# Patient Record
Sex: MALE | Race: WHITE | NOT HISPANIC OR LATINO | Employment: OTHER | URBAN - METROPOLITAN AREA
[De-identification: names, ages, dates, MRNs, and addresses within clinical notes are randomized per-mention and may not be internally consistent; named-entity substitution may affect disease eponyms.]

---

## 2019-01-31 ENCOUNTER — OFFICE VISIT (OUTPATIENT)
Dept: URGENT CARE | Age: 49
End: 2019-01-31
Payer: COMMERCIAL

## 2019-01-31 ENCOUNTER — HOSPITAL ENCOUNTER (EMERGENCY)
Facility: HOSPITAL | Age: 49
Discharge: HOME | End: 2019-01-31
Attending: EMERGENCY MEDICINE
Payer: COMMERCIAL

## 2019-01-31 VITALS
DIASTOLIC BLOOD PRESSURE: 98 MMHG | WEIGHT: 151 LBS | HEART RATE: 97 BPM | TEMPERATURE: 97.9 F | HEIGHT: 65 IN | RESPIRATION RATE: 20 BRPM | OXYGEN SATURATION: 98 % | SYSTOLIC BLOOD PRESSURE: 180 MMHG | BODY MASS INDEX: 25.16 KG/M2

## 2019-01-31 VITALS
HEART RATE: 85 BPM | DIASTOLIC BLOOD PRESSURE: 97 MMHG | SYSTOLIC BLOOD PRESSURE: 152 MMHG | OXYGEN SATURATION: 99 % | RESPIRATION RATE: 18 BRPM | BODY MASS INDEX: 24.8 KG/M2 | WEIGHT: 149.03 LBS | TEMPERATURE: 98 F

## 2019-01-31 DIAGNOSIS — F10.20 ALCOHOL DEPENDENCE, DAILY USE (HCC): ICD-10-CM

## 2019-01-31 DIAGNOSIS — R25.1 SHAKINESS: Primary | ICD-10-CM

## 2019-01-31 DIAGNOSIS — I10 HYPERTENSION, UNSPECIFIED TYPE: ICD-10-CM

## 2019-01-31 DIAGNOSIS — R25.1 INTERMITTENT TREMOR: Primary | ICD-10-CM

## 2019-01-31 LAB
ALBUMIN SERPL BCP-MCNC: 4.2 G/DL (ref 3.5–5)
ALP SERPL-CCNC: 61 U/L (ref 46–116)
ALT SERPL W P-5'-P-CCNC: 70 U/L (ref 12–78)
ANION GAP SERPL CALCULATED.3IONS-SCNC: 10 MMOL/L (ref 4–13)
AST SERPL W P-5'-P-CCNC: 43 U/L (ref 5–45)
BASOPHILS # BLD AUTO: 0.05 THOUSANDS/ΜL (ref 0–0.1)
BASOPHILS NFR BLD AUTO: 1 % (ref 0–1)
BILIRUB SERPL-MCNC: 0.5 MG/DL (ref 0.2–1)
BUN SERPL-MCNC: 17 MG/DL (ref 5–25)
CALCIUM SERPL-MCNC: 9.4 MG/DL (ref 8.3–10.1)
CHLORIDE SERPL-SCNC: 102 MMOL/L (ref 100–108)
CO2 SERPL-SCNC: 27 MMOL/L (ref 21–32)
CREAT SERPL-MCNC: 1.05 MG/DL (ref 0.6–1.3)
EOSINOPHIL # BLD AUTO: 0.06 THOUSAND/ΜL (ref 0–0.61)
EOSINOPHIL NFR BLD AUTO: 1 % (ref 0–6)
ERYTHROCYTE [DISTWIDTH] IN BLOOD BY AUTOMATED COUNT: 12.6 % (ref 11.6–15.1)
EST. AVERAGE GLUCOSE BLD GHB EST-MCNC: 103 MG/DL
GFR SERPL CREATININE-BSD FRML MDRD: 84 ML/MIN/1.73SQ M
GLUCOSE SERPL-MCNC: 101 MG/DL (ref 65–140)
GLUCOSE SERPL-MCNC: 83 MG/DL (ref 65–140)
HBA1C MFR BLD: 5.2 % (ref 4.2–6.3)
HCT VFR BLD AUTO: 45.3 % (ref 36.5–49.3)
HGB BLD-MCNC: 15.6 G/DL (ref 12–17)
IMM GRANULOCYTES # BLD AUTO: 0.04 THOUSAND/UL (ref 0–0.2)
IMM GRANULOCYTES NFR BLD AUTO: 1 % (ref 0–2)
LIPASE SERPL-CCNC: 241 U/L (ref 73–393)
LYMPHOCYTES # BLD AUTO: 1.16 THOUSANDS/ΜL (ref 0.6–4.47)
LYMPHOCYTES NFR BLD AUTO: 21 % (ref 14–44)
MAGNESIUM SERPL-MCNC: 1.9 MG/DL (ref 1.6–2.6)
MCH RBC QN AUTO: 32.7 PG (ref 26.8–34.3)
MCHC RBC AUTO-ENTMCNC: 34.4 G/DL (ref 31.4–37.4)
MCV RBC AUTO: 95 FL (ref 82–98)
MONOCYTES # BLD AUTO: 0.55 THOUSAND/ΜL (ref 0.17–1.22)
MONOCYTES NFR BLD AUTO: 10 % (ref 4–12)
NEUTROPHILS # BLD AUTO: 3.78 THOUSANDS/ΜL (ref 1.85–7.62)
NEUTS SEG NFR BLD AUTO: 66 % (ref 43–75)
NRBC BLD AUTO-RTO: 0 /100 WBCS
PHOSPHATE SERPL-MCNC: 3.6 MG/DL (ref 2.7–4.5)
PLATELET # BLD AUTO: 188 THOUSANDS/UL (ref 149–390)
PMV BLD AUTO: 8.7 FL (ref 8.9–12.7)
POTASSIUM SERPL-SCNC: 4.3 MMOL/L (ref 3.5–5.3)
PROT SERPL-MCNC: 7.9 G/DL (ref 6.4–8.2)
RBC # BLD AUTO: 4.77 MILLION/UL (ref 3.88–5.62)
SODIUM SERPL-SCNC: 139 MMOL/L (ref 136–145)
TSH SERPL DL<=0.05 MIU/L-ACNC: 1.95 UIU/ML (ref 0.36–3.74)
WBC # BLD AUTO: 5.64 THOUSAND/UL (ref 4.31–10.16)

## 2019-01-31 PROCEDURE — S9083 URGENT CARE CENTER GLOBAL: HCPCS | Performed by: FAMILY MEDICINE

## 2019-01-31 PROCEDURE — G0382 LEV 3 HOSP TYPE B ED VISIT: HCPCS | Performed by: FAMILY MEDICINE

## 2019-01-31 PROCEDURE — 84100 ASSAY OF PHOSPHORUS: CPT | Performed by: EMERGENCY MEDICINE

## 2019-01-31 PROCEDURE — 36415 COLL VENOUS BLD VENIPUNCTURE: CPT | Performed by: EMERGENCY MEDICINE

## 2019-01-31 PROCEDURE — 80053 COMPREHEN METABOLIC PANEL: CPT | Performed by: EMERGENCY MEDICINE

## 2019-01-31 PROCEDURE — 84443 ASSAY THYROID STIM HORMONE: CPT | Performed by: EMERGENCY MEDICINE

## 2019-01-31 PROCEDURE — 82948 REAGENT STRIP/BLOOD GLUCOSE: CPT | Performed by: PHYSICIAN ASSISTANT

## 2019-01-31 PROCEDURE — 83735 ASSAY OF MAGNESIUM: CPT | Performed by: EMERGENCY MEDICINE

## 2019-01-31 PROCEDURE — 96360 HYDRATION IV INFUSION INIT: CPT

## 2019-01-31 PROCEDURE — 85025 COMPLETE CBC W/AUTO DIFF WBC: CPT | Performed by: EMERGENCY MEDICINE

## 2019-01-31 PROCEDURE — 99284 EMERGENCY DEPT VISIT MOD MDM: CPT

## 2019-01-31 PROCEDURE — 93005 ELECTROCARDIOGRAM TRACING: CPT | Performed by: PHYSICIAN ASSISTANT

## 2019-01-31 PROCEDURE — 83690 ASSAY OF LIPASE: CPT | Performed by: EMERGENCY MEDICINE

## 2019-01-31 PROCEDURE — 83036 HEMOGLOBIN GLYCOSYLATED A1C: CPT | Performed by: EMERGENCY MEDICINE

## 2019-01-31 RX ORDER — DIPHENOXYLATE HYDROCHLORIDE AND ATROPINE SULFATE 2.5; .025 MG/1; MG/1
1 TABLET ORAL DAILY
COMMUNITY

## 2019-01-31 RX ADMIN — SODIUM CHLORIDE 1000 ML: 0.9 INJECTION, SOLUTION INTRAVENOUS at 15:27

## 2019-01-31 NOTE — PATIENT INSTRUCTIONS
Offered EKG, ambulance- Patient refused  Did allow POCT BG- 83  Patient initially refused ER transfer but then called his wife and then agreed to have her drive him to [facility] ER  Alert, oriented, capable of making own medical decisions and understands the risks of refusing ambulance transfer and EKG  He does agree to go to the ED for further evaluation and treatment

## 2019-01-31 NOTE — ED PROVIDER NOTES
History  Chief Complaint   Patient presents with    Shaking     Pt reports shakiness that comes and goes for the past few weeks  Pt denies pain  Pt reports he was seen at [place] [address] and he was sent to the ER  35-year-old male presents to the emergency department for evaluation of hand tremor  Patient states that he has noticed tremulousness of the hands intermittently over the past 1 month  Initially he thought this was due to caffeine intake and he cut back on coffee  He was also concerned that he may have an issue with his blood sugar but a blood sugar check at urgent care was normal   Patient admits to drinking approximately 10 to 15 alcoholic drinks per day  He has been drinking for over the past 18 years  He has never stop drinking and has not experience withdrawal symptoms  He states this morning was the most severe 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 and his hand was severely shaking  He did not drink any alcohol today but states that the shaking has improved throughout the day  He does note that the shakiness does tend to improve throughout the day when he starts drinking  He states he normally starts drinking around lunchtime and will drink until about 9:00 p m  Ye Castanon Patient's wife is at the bedside and states that she has never seen him not drink in the past 18 years with the relationship  Patient has not had a physical exam in several years  He went to urgent care today and was found to be slightly tremulous with elevated blood pressure and was directed to the emergency department  Patient denies nausea vomiting  No hallucinations  He denies diaphoresis  He has had a normal appetite  No weight loss or gain  When specifically asked the patient is interested in stopping drinking he states that he is not ready to make that decision          History provided by:  Patient, spouse and medical records   used: No    Drug / Alcohol Assessment   Location: Patient presents for evaluation of tremor  Quality:  Pain was tremor  Severity:  Moderate  Onset quality:  Gradual  Timing:  Intermittent  Progression:  Waxing and waning  Chronicity:  Recurrent  Context:  Drinks approximately 10 to 15 drinks per day  Relieved by:  Alcohol  Worsened by: Waking up in the morning  Ineffective treatments:  None dried  Associated symptoms: no abdominal pain, no diarrhea, no nausea, no shortness of breath and no vomiting        Prior to Admission Medications   Prescriptions Last Dose Informant Patient Reported? Taking? Fexofenadine HCl (ALLEGRA ALLERGY PO)   Yes Yes   Sig: Take by mouth   multivitamin (THERAGRAN) TABS   Yes Yes   Sig: Take 1 tablet by mouth daily      Facility-Administered Medications: None       Past Medical History:   Diagnosis Date    Allergic rhinitis        Past Surgical History:   Procedure Laterality Date    VASECTOMY         Family History   Problem Relation Age of Onset    No Known Problems Mother     No Known Problems Father      I have reviewed and agree with the history as documented  Social History   Substance Use Topics    Smoking status: Current Some Day Smoker     Types: Cigars    Smokeless tobacco: Never Used    Alcohol use 7 8 - 9 0 oz/week     10 - 12 Standard drinks or equivalent, 3 Cans of beer per week        Review of Systems   Constitutional: Negative for appetite change and unexpected weight change  Respiratory: Negative for shortness of breath  Gastrointestinal: Negative for abdominal pain, diarrhea, nausea and vomiting  Neurological: Positive for tremors  Negative for dizziness, speech difficulty and weakness  Psychiatric/Behavioral: Negative for sleep disturbance  The patient is not nervous/anxious  Frequent night terrors   All other systems reviewed and are negative  Physical Exam  Physical Exam   Constitutional: He is oriented to person, place, and time   Vital signs are normal  He appears well-developed and well-nourished  HENT:   Head: Normocephalic and atraumatic  Eyes: Pupils are equal, round, and reactive to light  Conjunctivae and EOM are normal    Neck: Normal range of motion  Neck supple  Cardiovascular: Normal rate, regular rhythm, normal heart sounds and intact distal pulses  No murmur heard  Pulmonary/Chest: Effort normal and breath sounds normal  No accessory muscle usage  No respiratory distress  He exhibits no tenderness  Abdominal: Soft  Normal appearance and bowel sounds are normal  He exhibits no distension  There is no tenderness  There is no rebound and no guarding  Musculoskeletal: Normal range of motion  He exhibits no edema, tenderness or deformity  Lymphadenopathy:     He has no cervical adenopathy  Neurological: He is alert and oriented to person, place, and time  He has normal strength and normal reflexes  He displays tremor  No cranial nerve deficit  He exhibits normal muscle tone  He displays no seizure activity  Coordination and gait normal  GCS eye subscore is 4  GCS verbal subscore is 5  GCS motor subscore is 6  Coarse tremor of hands with both arms held straight out   Skin: Skin is warm, dry and intact  No rash noted  Psychiatric: He has a normal mood and affect  His behavior is normal  Judgment and thought content normal    Nursing note and vitals reviewed        Vital Signs  ED Triage Vitals   Temperature Pulse Respirations Blood Pressure SpO2   01/31/19 1447 01/31/19 1445 01/31/19 1445 01/31/19 1445 01/31/19 1445   98 °F (36 7 °C) 85 18 152/97 99 %      Temp Source Heart Rate Source Patient Position - Orthostatic VS BP Location FiO2 (%)   01/31/19 1447 -- 01/31/19 1445 01/31/19 1445 --   Oral  Sitting Right arm       Pain Score       01/31/19 1445       No Pain           Vitals:    01/31/19 1445   BP: 152/97   Pulse: 85   Patient Position - Orthostatic VS: Sitting       Visual Acuity      ED Medications  Medications   sodium chloride 0 9 % bolus 1,000 mL (0 mL Intravenous Stopped 1/31/19 1617)       Diagnostic Studies  Results Reviewed     Procedure Component Value Units Date/Time    Hemoglobin A1C [[id number]] Collected:  01/31/19 1517    Lab Status: In process Specimen:  Blood Updated:  01/31/19 1602    Lipase [[id number]]  (Normal) Collected:  01/31/19 1517    Lab Status:  Final result Specimen:  Blood from Arm, Right Updated:  01/31/19 1547     Lipase 241 u/L     TSH [[id number]]  (Normal) Collected:  01/31/19 1517    Lab Status:  Final result Specimen:  Blood from Arm, Right Updated:  01/31/19 1547     TSH 3RD GENERATON 1 951 uIU/mL     Narrative:         Patients undergoing fluorescein dye angiography may retain small amounts of fluorescein in the body for 48-72 hours post procedure  Samples containing fluorescein can produce falsely depressed TSH values  If the patient had this procedure,a specimen should be resubmitted post fluorescein clearance      Magnesium [[id number]]  (Normal) Collected:  01/31/19 1517    Lab Status:  Final result Specimen:  Blood from Arm, Right Updated:  01/31/19 1547     Magnesium 1 9 mg/dL     Phosphorus [[id number]]  (Normal) Collected:  01/31/19 1517    Lab Status:  Final result Specimen:  Blood from Arm, Right Updated:  01/31/19 1547     Phosphorus 3 6 mg/dL     Comprehensive metabolic panel [[id number]] Collected:  01/31/19 1517    Lab Status:  Final result Specimen:  Blood from Arm, Right Updated:  01/31/19 1542     Sodium 139 mmol/L      Potassium 4 3 mmol/L      Chloride 102 mmol/L      CO2 27 mmol/L      ANION GAP 10 mmol/L      BUN 17 mg/dL      Creatinine 1 05 mg/dL      Glucose 101 mg/dL      Calcium 9 4 mg/dL      AST 43 U/L      ALT 70 U/L      Alkaline Phosphatase 61 U/L      Total Protein 7 9 g/dL      Albumin 4 2 g/dL      Total Bilirubin 0 50 mg/dL      eGFR 84 ml/min/1 73sq m     Narrative:         National Kidney Disease Education Program recommendations are as follows:  GFR calculation is accurate only with a steady state creatinine  Chronic Kidney disease less than 60 ml/min/1 73 sq  meters  Kidney failure less than 15 ml/min/1 73 sq  meters      CBC and differential [[id number]]  (Abnormal) Collected:  01/31/19 1517    Lab Status:  Final result Specimen:  Blood from Arm, Right Updated:  01/31/19 1522     WBC 5 64 Thousand/uL      RBC 4 77 Million/uL      Hemoglobin 15 6 g/dL      Hematocrit 45 3 %      MCV 95 fL      MCH 32 7 pg      MCHC 34 4 g/dL      RDW 12 6 %      MPV 8 7 (L) fL      Platelets 663 Thousands/uL      nRBC 0 /100 WBCs      Neutrophils Relative 66 %      Immat GRANS % 1 %      Lymphocytes Relative 21 %      Monocytes Relative 10 %      Eosinophils Relative 1 %      Basophils Relative 1 %      Neutrophils Absolute 3 78 Thousands/µL      Immature Grans Absolute 0 04 Thousand/uL      Lymphocytes Absolute 1 16 Thousands/µL      Monocytes Absolute 0 55 Thousand/µL      Eosinophils Absolute 0 06 Thousand/µL      Basophils Absolute 0 05 Thousands/µL                  No orders to display              Procedures  ECG 12 Lead Documentation  Date/Time: 1/31/2019 3:35 PM  Performed by: TAMERA CALVO  Authorized by: TAMERA CALVO     Indications / Diagnosis:  Tremor  ECG reviewed by me, the ED Provider: yes    Patient location:  ED  Previous ECG:     Previous ECG:  Unavailable  Interpretation:     Interpretation: normal    Rate:     ECG rate:  70    ECG rate assessment: normal    Rhythm:     Rhythm: sinus rhythm    Ectopy:     Ectopy: none    QRS:     QRS axis:  Normal  Conduction:     Conduction: normal    ST segments:     ST segments:  Normal  T waves:     T waves: normal             Phone Contacts  ED Phone Contact    ED Course                               MDM  Number of Diagnoses or Management Options  Alcohol dependence, daily use ([facility] [address] ): new and requires workup  Intermittent tremor: new and requires workup     Amount and/or Complexity of Data Reviewed  Clinical lab tests: ordered and reviewed  Tests in the medicine section of CPT®: ordered and reviewed  Decide to obtain previous medical records or to obtain history from someone other than the patient: yes  Obtain history from someone other than the patient: yes  Independent visualization of images, tracings, or specimens: yes    Risk of Complications, Morbidity, and/or Mortality  General comments: 50year old male presents with intermittent tremor, symptoms normally worse in the morning and improved with alcohol ingestion  Patient admits to heavy drinking daily  He is not ready to stop drinking  His wife is at the bedside and supportive of his decisions  His blood work is unremarkable  He is aware that his hemoglobin A1c is pending and will need to be followed up with his PC P  He does plan to see Dr Chelo Bond in March  I informed the patient that we do have resources in the event he is interested in alcohol rehab or detox  Patient declines crisis evaluation at this time  Patient Progress  Patient progress: stable      Disposition  Final diagnoses:   Intermittent tremor   Alcohol dependence, daily use ([address] )     Time reflects when diagnosis was documented in both MDM as applicable and the Disposition within this note     Time User Action Codes Description Comment    1/31/2019  4:06 PM Jerilyn Taylor Add [R25 1] Intermittent tremor     1/31/2019  4:08 PM Jerilyn Taylor Add [F10 20] Alcohol dependence, daily use [facility])       ED Disposition     ED Disposition Condition Date/Time Comment    Discharge  u Jan 31, 2019  4:09 PM Lorenzo Espinal discharge to home/self care      Condition at discharge: Stable        Follow-up Information     Follow up With Specialties Details Why [address] [place] [address] [facility], Physician Assistant Schedule an appointment as soon as possible for a visit in 2 days For recheck of current symptoms [address]  [phone]            Discharge Medication List as of 1/31/2019  4:09 PM      CONTINUE these medications which have NOT CHANGED    Details   Fexofenadine HCl (ALLEGRA ALLERGY PO) Take by mouth, Historical Med      multivitamin (THERAGRAN) TABS Take 1 tablet by mouth daily, Historical Med           No discharge procedures on file      ED Provider  Electronically Signed by           Kristi Torres DO  01/31/19 4363

## 2019-01-31 NOTE — PROGRESS NOTES
-  [facility]        NAME: Rajesh Bishop is a 50 y o  male  : 1970    MRN: [id number]  DATE: 2019  TIME: 1:24 PM    Assessment and Plan   Shakiness [R25 1]  1  Shakiness  Transfer to other facility   2  Hypertension, unspecified type       Potential alcohol withdrawal versus other etiologies  Blood pressure is also elevated patient has no history of high blood pressure  Patient needs further workup in the emergency department as he is shaking even at rest, moderate CIWA score  Patient Instructions     Offered EKG, ambulance- Patient refused  Did allow POCT BG- 83  Patient initially refused ER transfer as well, but then he called his wife and agreed to have her drive him to [facility]  Alert, oriented, capable of making own medical decisions and understands the risks of refusing ambulance transfer and EKG  He does agree to go to the ED for further evaluation and treatment  Chief Complaint     Chief Complaint   Patient presents with    Shaking     on and off for a couple weeks    has an apt with primary , feels his shaking is related to drinking         History of Present Illness       45-year-old male presents for evaluation of bilateral hands shaking  States it is worse today than it has been the past few weeks  Does admit to drinking about 10-15 beers or mixed drinks daily for the past few years  States he normally started drinking 3 drinks around lunchtime and then continues throughout the day  States today he was at work with his hands began to vigorously shake uncontrollably so he came for evaluation  States he tried to call his family doctor but the earliest appointment was  he could not wait that long as the worsened today  Does admit to feeling anxious and flushed face    He does note that sometimes when he gets like this he gets nauseous and sometimes feels palpitations in his chest  He denies any current nausea, vomiting, headaches, chest pain, palpitations or shortness of breath  He denies any tactile, visual or auditory disturbances  He states that shaking is is normally improved after an alcoholic beverage  Denies any history of high blood pressure, diabetes, seizures, strokes, or other past medical history  Denies any drug use other than alcohol  Has never been inpatient or admitted for alcohol use or psychiatric disorders  He is not interested in seeking rehab as he does not feel he has an alcohol problem  Review of Systems   Review of Systems   Constitutional: Negative for diaphoresis and fever  HENT: Negative for ear pain, rhinorrhea, sore throat and trouble swallowing  Eyes: Negative for itching and visual disturbance  Respiratory: Negative for cough, chest tightness, shortness of breath and wheezing  Cardiovascular: Positive for palpitations (intermittent)  Negative for chest pain and leg swelling  Gastrointestinal: Negative for abdominal pain, diarrhea and vomiting  Musculoskeletal: Negative for back pain, joint swelling, neck pain and neck stiffness  Skin: Negative for rash  Neurological: Positive for tremors  Negative for dizziness, seizures, weakness, numbness and headaches  Psychiatric/Behavioral: Negative for confusion, decreased concentration, hallucinations, self-injury and suicidal ideas  The patient is nervous/anxious  All other systems reviewed and are negative          Current Medications       Current Outpatient Prescriptions:     Fexofenadine HCl (ALLEGRA ALLERGY PO), Take by mouth, Disp: , Rfl:     multivitamin (THERAGRAN) TABS, Take 1 tablet by mouth daily, Disp: , Rfl:     Current Allergies     Allergies as of 01/31/2019 - never reviewed   Allergen Reaction Noted    Other Allergic Rhinitis 01/31/2019            The following portions of the patient's history were reviewed and updated as appropriate: allergies, current medications, past family history, past medical history, past social history, past surgical history and problem list      Past Medical History:   Diagnosis Date    Allergic rhinitis        Past Surgical History:   Procedure Laterality Date    VASECTOMY         Family History   Problem Relation Age of Onset    No Known Problems Mother     No Known Problems Father          Medications have been verified  Objective   BP (!) 180/98   Pulse 97   Temp 97 9 °F (36 6 °C) (Temporal)   Resp 20   Ht 5' 5" (1 651 m)   Wt 68 5 kg (151 lb)   SpO2 98%   BMI 25 13 kg/m²        Physical Exam     Physical Exam   Constitutional: He is oriented to person, place, and time  He appears well-developed and well-nourished  Patient appears mildly anxious   HENT:   Head: Normocephalic and atraumatic  Mouth/Throat: Oropharynx is clear and moist    Eyes: Pupils are equal, round, and reactive to light  Conjunctivae and EOM are normal    No nystagmus   Neck: Normal range of motion  Neck supple  Cardiovascular: Normal rate, regular rhythm and normal heart sounds  Pulmonary/Chest: Effort normal and breath sounds normal  He has no wheezes  Neurological: He is alert and oriented to person, place, and time  Bilateral hand tremor/shakiness at rest   Skin: Skin is warm and dry  Psychiatric: He has a normal mood and affect  His behavior is normal  Thought content is not paranoid and not delusional  He expresses no homicidal and no suicidal ideation  Nursing note and vitals reviewed

## 2019-01-31 NOTE — DISCHARGE INSTRUCTIONS
Alcohol Use Disorder   WHAT YOU NEED TO KNOW:   What is alcohol use disorder? Alcohol use disorder (AUD) is problem drinking  AUD includes alcohol abuse and alcohol dependency  What are the symptoms of AUD? · You have tried to decrease or stop drinking more than once  You are not able to control your drinking habits  You keep going back to drinking even after you quit  · You put extra effort and time into drinking alcohol  You may often go to events or activities that will include drinking  You may also spend much of your time drinking alcohol or being with people who also drink  You may also spend a lot of time recovering from the effects of drinking  · You keep drinking alcohol even if you know it increases your risk for health problems  Health problems include liver problems, stomach ulcers, high blood pressure, and stroke  · You develop alcohol tolerance  Tolerance means the amount of alcohol you usually drink no longer causes the effects you may desire  You may need to drink even more alcohol to get the same effects  · You have withdrawal (physical or mental) symptoms after not drinking for a short period  The same amount of alcohol may be needed to relieve or prevent withdrawal symptoms such as tremors (shakes)  You may also have to drink to stop withdrawal symptoms or to cure a hangover  · You crave alcohol  You may have a desire to drink more often and to drink larger amounts of alcohol  · You spend less time doing more important things  You have trouble taking part in social or daily activities at school, work, or home  · You continue to drink even when it causes problems  You may have problems with your family, friends, or coworkers but still want to drink  · You have given up activities that you like  You would rather drink instead  · You have put yourself in physically dangerous settings while drinking    These may include driving a car or having unprotected sex after drinking  How is AUD diagnosed? Your healthcare provider will ask you about your symptoms over the last 12 months  He will diagnose you as having mild, moderate, or severe AUD if you have 2 or more symptoms  How is AUD treated? Your healthcare provider may admit you to the hospital to help you withdraw from alcohol safely  Then you may need any of the following:  · Medicines to decrease your craving for alcohol    · Support groups such as Alcoholics Anonymous     · Therapy services from a psychiatrist or psychologist     · Admission to an inpatient facility for treatment for severe dependence  Where can I get more information about AUD? · Substance Abuse and [address] [id number]  Web Address: https://[employer]/  · Alcoholics Anonymous  Web Address: http://[employer]/  What are the risks of AUD? Alcohol can damage your brain, heart, kidneys, lungs, and liver  Your risk of stroke is greater if you have 5 or more drinks each day  If you are pregnant, you and your baby are at risk for serious health problems  CARE AGREEMENT:   You have the right to help plan your care  Learn about your health condition and how it may be treated  Discuss treatment options with your caregivers to decide what care you want to receive  You always have the right to refuse treatment  The above information is an  only  It is not intended as medical advice for individual conditions or treatments  Talk to your doctor, nurse or pharmacist before following any medical regimen to see if it is safe and effective for you  © 2017 [employer] Information is for End User's use only and may not be sold, redistributed or otherwise used for commercial purposes  All illustrations and images included in CareNotes® are the copyrighted property of [employer]  or [employer]      Tremors   WHAT YOU NEED TO KNOW:   A tremor is a movement you cannot control that occurs in a rhythm  Tremors most commonly occur in the hands  Other common places include the head or face, trunk, or legs  Your voice can also have a tremor and sound shaky when you speak  A tremor may be caused by a nerve problem, too much thyroid hormone, or by certain medicines, caffeine, or alcohol  Tremors may be temporary or permanent  The tremor may go away and return, or worsen with stress  Tremors can happen at any age, but they are more common in later years  DISCHARGE INSTRUCTIONS:   Contact your healthcare provider if:   · You have a new or worsening tremor  · You have tremors that make it difficult to do your regular activities  · You have questions or concerns about your condition or care  Medicines:   · Medicines  may be used to help control some kinds of tremors  · Take your medicine as directed  Contact your healthcare provider if you think your medicine is not helping or if you have side effects  Tell him or her if you are allergic to any medicine  Keep a list of the medicines, vitamins, and herbs you take  Include the amounts, and when and why you take them  Bring the list or the pill bottles to follow-up visits  Carry your medicine list with you in case of an emergency  Manage your symptoms:   · Do not have caffeine or other chemicals that affect your nerves  Limit or do not have caffeine  Caffeine can make tremors worse  Talk to your healthcare provider about herbal medicines, teas, and supplements  They may also increase tremors  Do not use illegal drugs  · Go to physical and occupational therapy as directed  A physical therapist can teach you exercises to help reduce the tremor and improve muscle control  You may be shown how to hold the body part during a tremor to help control the movement  The therapist can also help you build strength and balance  An occupational therapist can show you how to use adaptive devices to help you move more easily  · Use objects that will help you control movements  You may have more hand control if you add a watch or bracelet to your wrist  It may be easier for you to drink from a straw, or to fill your cup only half full  Cups with lids, such as travel mugs, can also help you drink with more control  Heavy eating utensils can help you eat more easily  A button fastener can help you button clothing if tremors in your hands make this difficult  · Set a regular sleep schedule  Lack of sleep can make tremors worse  Try to go to bed at the same time each night and wake up at the same time each morning  Follow up with your healthcare provider as directed:  Write down your questions so you remember to ask them during your visits  © 2017 [employer]  Information is for End User's use only and may not be sold, redistributed or otherwise used for commercial purposes  All illustrations and images included in CareNotes® are the copyrighted property of [employer]  or Juan Montero  The above information is an  only  It is not intended as medical advice for individual conditions or treatments  Talk to your doctor, nurse or pharmacist before following any medical regimen to see if it is safe and effective for you

## 2019-02-01 LAB
ATRIAL RATE: 70 BPM
P AXIS: 52 DEGREES
PR INTERVAL: 178 MS
QRS AXIS: 37 DEGREES
QRSD INTERVAL: 80 MS
QT INTERVAL: 358 MS
QTC INTERVAL: 386 MS
T WAVE AXIS: 38 DEGREES
VENTRICULAR RATE: 70 BPM

## 2019-02-01 PROCEDURE — 93010 ELECTROCARDIOGRAM REPORT: CPT | Performed by: INTERNAL MEDICINE

## 2019-03-08 ENCOUNTER — OFFICE VISIT (OUTPATIENT)
Dept: FAMILY MEDICINE CLINIC | Facility: CLINIC | Age: 49
End: 2019-03-08
Payer: COMMERCIAL

## 2019-03-08 VITALS
RESPIRATION RATE: 16 BRPM | HEIGHT: 67 IN | DIASTOLIC BLOOD PRESSURE: 84 MMHG | OXYGEN SATURATION: 96 % | WEIGHT: 152.6 LBS | HEART RATE: 80 BPM | TEMPERATURE: 98.4 F | SYSTOLIC BLOOD PRESSURE: 136 MMHG | BODY MASS INDEX: 23.95 KG/M2

## 2019-03-08 DIAGNOSIS — J30.89 ENVIRONMENTAL AND SEASONAL ALLERGIES: ICD-10-CM

## 2019-03-08 DIAGNOSIS — Z72.89 ALCOHOL USE: ICD-10-CM

## 2019-03-08 DIAGNOSIS — Z76.89 ENCOUNTER TO ESTABLISH CARE WITH NEW DOCTOR: Primary | ICD-10-CM

## 2019-03-08 DIAGNOSIS — Z13.1 SCREENING FOR DIABETES MELLITUS: ICD-10-CM

## 2019-03-08 DIAGNOSIS — Z00.00 ROUTINE ADULT HEALTH MAINTENANCE: ICD-10-CM

## 2019-03-08 DIAGNOSIS — Z13.220 SCREENING FOR HYPERLIPIDEMIA: ICD-10-CM

## 2019-03-08 PROBLEM — Z78.9 ALCOHOL USE: Status: ACTIVE | Noted: 2019-03-08

## 2019-03-08 PROBLEM — F10.90 ALCOHOL USE: Status: ACTIVE | Noted: 2019-03-08

## 2019-03-08 PROCEDURE — 99386 PREV VISIT NEW AGE 40-64: CPT | Performed by: FAMILY MEDICINE

## 2019-03-08 NOTE — ASSESSMENT & PLAN NOTE
Discussed at length his current usage, 7-8 drinks/day  Pt does not think he has a problem, not willing to attend AA or rehab  He admits that he cant get fasting labs done because he can't go that long without a drink  Counseled at length about my concerns regarding his current use, advised to decrease slowly to <2 drinks/day  He is to call with symptoms or when he is ready to quit or if he needs resources

## 2019-03-08 NOTE — ASSESSMENT & PLAN NOTE
Imm: UTD on flu and TDaP  Labs: Check CMP and FLP, declined PSA screen  Colon cancer screen: low-risk, start at age 48

## 2019-03-08 NOTE — PROGRESS NOTES
FAMILY MEDICINE NEW PATIENT NOTE  Clinton Flores 50 y o  male   DATE: March 8, 2019      ASSESSMENT and PLAN:  Clinton Flores is a 50 y o  male here to establish care with:     Routine adult health maintenance  Imm: UTD on flu and TDaP  Labs: Check CMP and FLP, declined PSA screen  Colon cancer screen: low-risk, start at age 48    Alcohol use  Discussed at length his current usage, 7-8 drinks/day  Pt does not think he has a problem, not willing to attend AA or rehab  He admits that he cant get fasting labs done because he can't go that long without a drink  Counseled at length about my concerns regarding his current use, advised to decrease slowly to <2 drinks/day  He is to call with symptoms or when he is ready to quit or if he needs resources  Environmental and seasonal allergies  Well controlled with alternating Allegra and Claritin      SUBJECTIVE:  Clinton Flores is a 50 y o  male who presents today with a chief complaint of Well Check  Pt is here to establish care  Previous PCP: Here, but >10 years ago    PMH:  Allergies- alternates allegra and claritin    PSH:   Vasectomy- 16 years ago    Diet: Healthy  Exercise: active playing disc golf    Acute Concerns:  1  Alcohol use- previously was drinking 11-15 drinks/day, down to about 7-8 drinks/day  He was in the ER on 1/31 for tremors and was told that it was due to his drinking  He does not believe he has a problem, he is fine with his current usage  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  Review of Systems   Constitutional: Negative for chills and fever  HENT: Negative for ear pain  Eyes: Negative for visual disturbance  Respiratory: Negative for cough and shortness of breath  Cardiovascular: Negative for chest pain and palpitations  Gastrointestinal: Negative for abdominal pain, diarrhea, nausea and vomiting  Musculoskeletal: Negative for arthralgias  Skin: Negative for rash  Neurological: Positive for tremors  Hematological: Does not bruise/bleed easily  I have reviewed the patient's PMH, Surgical History, Family History, Social History, Medication List and Allergies  Histories Reviewed and Updated 3/8/2019:  Patient's Medications   New Prescriptions    No medications on file   Previous Medications    FEXOFENADINE HCL (ALLEGRA ALLERGY PO)    Take by mouth    MULTIVITAMIN (THERAGRAN) TABS    Take 1 tablet by mouth daily   Modified Medications    No medications on file   Discontinued Medications    No medications on file     Allergies   Allergen Reactions    Other Allergic Rhinitis     seasonal     Past Medical History:   Diagnosis Date    Allergic rhinitis      Past Surgical History:   Procedure Laterality Date    VASECTOMY       Social History     Socioeconomic History    Marital status: /Civil Union     Spouse name: Not on file    Number of children: Not on file    Years of education: Not on file    Highest education level: Not on file   Occupational History    Not on file   Social Needs    Financial resource strain: Not on file    Food insecurity:     Worry: Not on file     Inability: Not on file    Transportation needs:     Medical: Not on file     Non-medical: Not on file   Tobacco Use    Smoking status: Current Some Day Smoker     Types: Cigars    Smokeless tobacco: Never Used    Tobacco comment: cigars 4-5x/week   Substance and Sexual Activity    Alcohol use:  Yes     Alcohol/week: 7 8 - 9 0 oz     Types: 3 Cans of beer, 10 - 12 Standard drinks or equivalent per week     Frequency: 4 or more times a week    Drug use: No    Sexual activity: Yes     Partners: Female   Lifestyle    Physical activity:     Days per week: Not on file     Minutes per session: Not on file    Stress: Not on file   Relationships    Social connections:     Talks on phone: Not on file     Gets together: Not on file     Attends Judaism service: Not on file     Active member of club or organization: Not on file     Attends meetings of clubs or organizations: Not on file     Relationship status: Not on file    Intimate partner violence:     Fear of current or ex partner: Not on file     Emotionally abused: Not on file     Physically abused: Not on file     Forced sexual activity: Not on file   Other Topics Concern    Not on file   Social History Narrative    Not on file     Family History   Problem Relation Age of Onset    No Known Problems Mother     No Known Problems Father      Immunization History   Administered Date(s) Administered    INFLUENZA 12/24/2018    Influenza, injectable, quadrivalent, preservative free 0 5 mL 12/23/2018     OBJECTIVE:  /84   Pulse 80   Temp 98 4 °F (36 9 °C)   Resp 16   Ht 5' 7" (1 702 m)   Wt 69 2 kg (152 lb 9 6 oz)   SpO2 96%   BMI 23 90 kg/m²   Physical Exam   Constitutional: He is oriented to person, place, and time  He appears well-developed and well-nourished  No distress  HENT:   Head: Normocephalic and atraumatic  Mouth/Throat: Oropharynx is clear and moist  No oropharyngeal exudate  Eyes: Pupils are equal, round, and reactive to light  EOM are normal  Right eye exhibits no discharge  Left eye exhibits no discharge  Neck: Normal range of motion  Neck supple  No JVD present  Cardiovascular: Normal rate, regular rhythm and normal heart sounds  No murmur heard  Pulmonary/Chest: Effort normal and breath sounds normal  No stridor  No respiratory distress  He has no wheezes  Abdominal: Soft  Bowel sounds are normal  There is no tenderness  There is no rebound and no guarding  Musculoskeletal: Normal range of motion  He exhibits no edema or tenderness  Neurological: He is alert and oriented to person, place, and time  Skin: Skin is warm and dry  He is not diaphoretic  No erythema  Psychiatric: He has a normal mood and affect  His behavior is normal    Vitals reviewed      PHQ-9 Depression Screening    PHQ-9:    Frequency of the following problems over the past two weeks:       Little interest or pleasure in doing things:  0 - not at all  Feeling down, depressed, or hopeless:  0 - not at all  PHQ-2 Score:  0         Benedict Collins MD

## 2019-05-06 ENCOUNTER — OFFICE VISIT (OUTPATIENT)
Dept: FAMILY MEDICINE CLINIC | Facility: CLINIC | Age: 49
End: 2019-05-06
Payer: COMMERCIAL

## 2019-05-06 VITALS
OXYGEN SATURATION: 98 % | TEMPERATURE: 97.8 F | WEIGHT: 147.5 LBS | RESPIRATION RATE: 18 BRPM | HEART RATE: 88 BPM | SYSTOLIC BLOOD PRESSURE: 122 MMHG | HEIGHT: 67 IN | DIASTOLIC BLOOD PRESSURE: 82 MMHG | BODY MASS INDEX: 23.15 KG/M2

## 2019-05-06 DIAGNOSIS — L30.9 ECZEMA, UNSPECIFIED TYPE: Primary | ICD-10-CM

## 2019-05-06 PROCEDURE — 3008F BODY MASS INDEX DOCD: CPT | Performed by: NURSE PRACTITIONER

## 2019-05-06 PROCEDURE — 99213 OFFICE O/P EST LOW 20 MIN: CPT | Performed by: NURSE PRACTITIONER

## 2019-05-06 PROCEDURE — 1111F DSCHRG MED/CURRENT MED MERGE: CPT | Performed by: NURSE PRACTITIONER

## 2019-05-06 RX ORDER — TRIAMCINOLONE ACETONIDE 1 MG/G
CREAM TOPICAL 2 TIMES DAILY
Qty: 45 G | Refills: 0 | Status: SHIPPED | OUTPATIENT
Start: 2019-05-06 | End: 2020-06-16 | Stop reason: ALTCHOICE

## 2020-06-16 ENCOUNTER — OFFICE VISIT (OUTPATIENT)
Dept: FAMILY MEDICINE CLINIC | Facility: CLINIC | Age: 50
End: 2020-06-16
Payer: COMMERCIAL

## 2020-06-16 VITALS
HEART RATE: 98 BPM | BODY MASS INDEX: 23.07 KG/M2 | OXYGEN SATURATION: 99 % | SYSTOLIC BLOOD PRESSURE: 138 MMHG | WEIGHT: 147 LBS | TEMPERATURE: 97.7 F | HEIGHT: 67 IN | DIASTOLIC BLOOD PRESSURE: 82 MMHG | RESPIRATION RATE: 16 BRPM

## 2020-06-16 DIAGNOSIS — Z00.00 ROUTINE ADULT HEALTH MAINTENANCE: Primary | ICD-10-CM

## 2020-06-16 DIAGNOSIS — Z11.4 ENCOUNTER FOR SCREENING FOR HIV: ICD-10-CM

## 2020-06-16 DIAGNOSIS — J30.89 ENVIRONMENTAL AND SEASONAL ALLERGIES: ICD-10-CM

## 2020-06-16 DIAGNOSIS — Z72.89 ALCOHOL USE: ICD-10-CM

## 2020-06-16 DIAGNOSIS — Z12.11 COLON CANCER SCREENING: ICD-10-CM

## 2020-06-16 PROCEDURE — 3008F BODY MASS INDEX DOCD: CPT | Performed by: FAMILY MEDICINE

## 2020-06-16 PROCEDURE — 3075F SYST BP GE 130 - 139MM HG: CPT | Performed by: FAMILY MEDICINE

## 2020-06-16 PROCEDURE — 99396 PREV VISIT EST AGE 40-64: CPT | Performed by: FAMILY MEDICINE

## 2020-06-16 PROCEDURE — 3079F DIAST BP 80-89 MM HG: CPT | Performed by: FAMILY MEDICINE

## 2020-07-27 ENCOUNTER — PREP FOR PROCEDURE (OUTPATIENT)
Dept: GASTROENTEROLOGY | Facility: CLINIC | Age: 50
End: 2020-07-27

## 2020-07-27 ENCOUNTER — TELEPHONE (OUTPATIENT)
Dept: GASTROENTEROLOGY | Facility: CLINIC | Age: 50
End: 2020-07-27

## 2020-07-27 DIAGNOSIS — Z12.11 SCREENING FOR COLON CANCER: Primary | ICD-10-CM

## 2020-07-27 DIAGNOSIS — Z12.11 COLON CANCER SCREENING: Primary | ICD-10-CM

## 2020-07-27 NOTE — TELEPHONE ENCOUNTER
Called pt, passed OA screening and is scheduled for colonoscopy on 8/25 at [facility]  Reviewed prep instructions, reminded needs a  for the procedure and will get a call the day before with the arrival time  Pt is scheduled/emailed prep instructions  Please send Suprep to [employer]  Thank you!

## 2020-07-27 NOTE — TELEPHONE ENCOUNTER
07/27/20  Screened by: Jesus Goncalves     Referring Provider Hua Schmitz     : If patient answers NO to medical questions, then schedule procedure  If patient answers YES to medical questions, then schedule office appointment      Previous Colonoscopy no  Date and Facility of last colonoscopy?      Comments: Schedule with Dr Sheela Valdovinos on 8/25 [facility]           Pre- Screening: Body mass index is 23 02 kg/m²  Has patient been referred for a routine screening Colonoscopy? yes  Is the patient between 39-70 years old? yes     SCHEDULING STAFF  · If the patient is between 45yrs-49yrs, please advise patient to confirm benefits/coverage with their insurance company for a routine screening colonoscopy, some insurance carriers will only cover at [phone] or older  · If the patient is over 76years old, please schedule an office visit  · If the patient had a previous colonoscopy send to the procedure  before continuing     Have you been diagnosed with a bleeding disorder or anemia? no     Do you take no     Have you been diagnosed with Diabetes or are you taking any   Diabetic medications? no     Do you have any of the following symptoms? no     Have you had a coronary or vascular stent within the last year? no     Have you had a heart attack or stroke in the last 6 months? no     Have you had intestinal surgery in the last 3 months? no     Do you have problems with: no     Do you use: no     Have you been hospitalized in the last Month? no     Have you had chest pain (angina) or breathing problems  (COPD) in the last 3 months? no     Do you have any difficulty walking up a flight of stairs? no     Have you had Kidney failure or insufficiency? no     Have you had heart valve surgery? no     Are you confined to a wheelchair?  no

## 2020-07-29 ENCOUNTER — APPOINTMENT (OUTPATIENT)
Dept: LAB | Facility: CLINIC | Age: 50
End: 2020-07-29
Payer: COMMERCIAL

## 2020-07-29 DIAGNOSIS — Z11.4 ENCOUNTER FOR SCREENING FOR HIV: ICD-10-CM

## 2020-07-29 DIAGNOSIS — Z00.00 ROUTINE ADULT HEALTH MAINTENANCE: ICD-10-CM

## 2020-07-29 DIAGNOSIS — Z72.89 ALCOHOL USE: ICD-10-CM

## 2020-07-29 LAB
ALBUMIN SERPL BCP-MCNC: 4 G/DL (ref 3.5–5)
ALP SERPL-CCNC: 54 U/L (ref 46–116)
ALT SERPL W P-5'-P-CCNC: 43 U/L (ref 12–78)
ANION GAP SERPL CALCULATED.3IONS-SCNC: 7 MMOL/L (ref 4–13)
AST SERPL W P-5'-P-CCNC: 43 U/L (ref 5–45)
BASOPHILS # BLD AUTO: 0.06 THOUSANDS/ΜL (ref 0–0.1)
BASOPHILS NFR BLD AUTO: 1 % (ref 0–1)
BILIRUB SERPL-MCNC: 0.93 MG/DL (ref 0.2–1)
BUN SERPL-MCNC: 10 MG/DL (ref 5–25)
CALCIUM SERPL-MCNC: 9.8 MG/DL (ref 8.3–10.1)
CHLORIDE SERPL-SCNC: 106 MMOL/L (ref 100–108)
CHOLEST SERPL-MCNC: 230 MG/DL (ref 50–200)
CO2 SERPL-SCNC: 27 MMOL/L (ref 21–32)
CREAT SERPL-MCNC: 0.93 MG/DL (ref 0.6–1.3)
EOSINOPHIL # BLD AUTO: 0.1 THOUSAND/ΜL (ref 0–0.61)
EOSINOPHIL NFR BLD AUTO: 2 % (ref 0–6)
ERYTHROCYTE [DISTWIDTH] IN BLOOD BY AUTOMATED COUNT: 12.4 % (ref 11.6–15.1)
GFR SERPL CREATININE-BSD FRML MDRD: 95 ML/MIN/1.73SQ M
GLUCOSE P FAST SERPL-MCNC: 99 MG/DL (ref 65–99)
HCT VFR BLD AUTO: 46.4 % (ref 36.5–49.3)
HDLC SERPL-MCNC: 109 MG/DL
HGB BLD-MCNC: 15.9 G/DL (ref 12–17)
IMM GRANULOCYTES # BLD AUTO: 0.02 THOUSAND/UL (ref 0–0.2)
IMM GRANULOCYTES NFR BLD AUTO: 1 % (ref 0–2)
LDLC SERPL CALC-MCNC: 101 MG/DL (ref 0–100)
LYMPHOCYTES # BLD AUTO: 1.22 THOUSANDS/ΜL (ref 0.6–4.47)
LYMPHOCYTES NFR BLD AUTO: 29 % (ref 14–44)
MCH RBC QN AUTO: 33.5 PG (ref 26.8–34.3)
MCHC RBC AUTO-ENTMCNC: 34.3 G/DL (ref 31.4–37.4)
MCV RBC AUTO: 98 FL (ref 82–98)
MONOCYTES # BLD AUTO: 0.64 THOUSAND/ΜL (ref 0.17–1.22)
MONOCYTES NFR BLD AUTO: 15 % (ref 4–12)
NEUTROPHILS # BLD AUTO: 2.2 THOUSANDS/ΜL (ref 1.85–7.62)
NEUTS SEG NFR BLD AUTO: 52 % (ref 43–75)
NRBC BLD AUTO-RTO: 0 /100 WBCS
PLATELET # BLD AUTO: 207 THOUSANDS/UL (ref 149–390)
PMV BLD AUTO: 9.8 FL (ref 8.9–12.7)
POTASSIUM SERPL-SCNC: 4.7 MMOL/L (ref 3.5–5.3)
PROT SERPL-MCNC: 7.7 G/DL (ref 6.4–8.2)
RBC # BLD AUTO: 4.74 MILLION/UL (ref 3.88–5.62)
SODIUM SERPL-SCNC: 140 MMOL/L (ref 136–145)
TRIGL SERPL-MCNC: 99 MG/DL
TSH SERPL DL<=0.05 MIU/L-ACNC: 2.3 UIU/ML (ref 0.36–3.74)
WBC # BLD AUTO: 4.24 THOUSAND/UL (ref 4.31–10.16)

## 2020-07-29 PROCEDURE — 80061 LIPID PANEL: CPT

## 2020-07-29 PROCEDURE — 80053 COMPREHEN METABOLIC PANEL: CPT

## 2020-07-29 PROCEDURE — 84443 ASSAY THYROID STIM HORMONE: CPT

## 2020-07-29 PROCEDURE — 85025 COMPLETE CBC W/AUTO DIFF WBC: CPT

## 2020-07-29 PROCEDURE — 87389 HIV-1 AG W/HIV-1&-2 AB AG IA: CPT

## 2020-07-29 PROCEDURE — 36415 COLL VENOUS BLD VENIPUNCTURE: CPT

## 2020-07-31 LAB — HIV 1+2 AB+HIV1 P24 AG SERPL QL IA: NORMAL

## 2020-08-03 ENCOUNTER — TELEPHONE (OUTPATIENT)
Dept: FAMILY MEDICINE CLINIC | Facility: CLINIC | Age: 50
End: 2020-08-03

## 2020-08-03 PROBLEM — D72.819 CHRONIC LEUKOPENIA: Status: ACTIVE | Noted: 2020-08-03

## 2020-08-03 PROBLEM — E78.00 PURE HYPERCHOLESTEROLEMIA: Status: ACTIVE | Noted: 2020-08-03

## 2020-08-03 NOTE — TELEPHONE ENCOUNTER
Please call Maile Graham and let him know that his labs were normal, except his cholesterol levels are elevated, and his white blood cell count is a little low  Both of those can be affected by a regular alcohol use, so before starting any medications are doing further workup, I recommend he decrease his alcohol use and repeat labs in 3-6 months  Thank you! Appointment on 07/29/2020   Component Date Value Ref Range Status    Cholesterol 07/29/2020 230* 50 - 200 mg/dL Final      Cholesterol:       Desirable         <200 mg/dl       Borderline         200-239 mg/dl       High              >239           Triglycerides 07/29/2020 99  <=150 mg/dL Final      Triglyceride:     Normal          <150 mg/dl     Borderline High 150-199 mg/dl     High            200-499 mg/dl        Very High       >499 mg/dl    Specimen collection should occur prior to N-Acetylcysteine or Metamizole administration due to the potential for falsely depressed results   HDL, Direct 07/29/2020 109  >=40 mg/dL Final      HDL Cholesterol:       Low     <41 mg/dL  Specimen collection should occur prior to Metamizole administration due to the potential for falsley depressed results   LDL Calculated 07/29/2020 101* 0 - 100 mg/dL Final      LDL Cholesterol:     Optimal           <100 mg/dl     Near Optimal      100-129 mg/dl     Above Optimal       Borderline High 130-159 mg/dl       High            160-189 mg/dl       Very High       >189 mg/dl         This screening LDL is a calculated result  It does not have the accuracy of the Direct Measured LDL in the monitoring of patients with hyperlipidemia and/or statin therapy  Direct Measure LDL (YTL431) must be ordered separately in these patients      Sodium 07/29/2020 140  136 - 145 mmol/L Final    Potassium 07/29/2020 4 7  3 5 - 5 3 mmol/L Final    Chloride 07/29/2020 106  100 - 108 mmol/L Final    CO2 07/29/2020 27  21 - 32 mmol/L Final    ANION GAP 07/29/2020 7  4 - 13 mmol/L Final    BUN 07/29/2020 10  5 - 25 mg/dL Final    Creatinine 07/29/2020 0 93  0 60 - 1 30 mg/dL Final    Standardized to IDMS reference method    Glucose, Fasting 07/29/2020 99  65 - 99 mg/dL Final      Specimen collection should occur prior to Sulfasalazine administration due to the potential for falsely depressed results  Specimen collection should occur prior to Sulfapyridine administration due to the potential for falsely elevated results   Calcium 07/29/2020 9 8  8 3 - 10 1 mg/dL Final    AST 07/29/2020 43  5 - 45 U/L Final      Specimen collection should occur prior to Sulfasalazine administration due to the potential for falsely depressed results   ALT 07/29/2020 43  12 - 78 U/L Final      Specimen collection should occur prior to Sulfasalazine and/or Sulfapyridine administration due to the potential for falsely depressed results   Alkaline Phosphatase 07/29/2020 54  46 - 116 U/L Final    Total Protein 07/29/2020 7 7  6 4 - 8 2 g/dL Final    Albumin 07/29/2020 4 0  3 5 - 5 0 g/dL Final    Total Bilirubin 07/29/2020 0 93  0 20 - 1 00 mg/dL Final      Use of this assay is not recommended for patients undergoing treatment with eltrombopag due to the potential for falsely elevated results      eGFR 07/29/2020 95  ml/min/1 73sq m Final    WBC 07/29/2020 4 24* 4 31 - 10 16 Thousand/uL Final    RBC 07/29/2020 4 74  3 88 - 5 62 Million/uL Final    Hemoglobin 07/29/2020 15 9  12 0 - 17 0 g/dL Final    Hematocrit 07/29/2020 46 4  36 5 - 49 3 % Final    MCV 07/29/2020 98  82 - 98 fL Final    MCH 07/29/2020 33 5  26 8 - 34 3 pg Final    MCHC 07/29/2020 34 3  31 4 - 37 4 g/dL Final    RDW 07/29/2020 12 4  11 6 - 15 1 % Final    MPV 07/29/2020 9 8  8 9 - 12 7 fL Final    Platelets 05/30/0496 207  149 - 390 Thousands/uL Final    nRBC 07/29/2020 0  /100 WBCs Final    Neutrophils Relative 07/29/2020 52  43 - 75 % Final    Immat GRANS % 07/29/2020 1  0 - 2 % Final    Lymphocytes Relative 07/29/2020 29  14 - 44 % Final    Monocytes Relative 07/29/2020 15* 4 - 12 % Final    Eosinophils Relative 07/29/2020 2  0 - 6 % Final    Basophils Relative 07/29/2020 1  0 - 1 % Final    Neutrophils Absolute 07/29/2020 2 20  1 85 - 7 62 Thousands/µL Final    Immature Grans Absolute 07/29/2020 0 02  0 00 - 0 20 Thousand/uL Final    Lymphocytes Absolute 07/29/2020 1 22  0 60 - 4 47 Thousands/µL Final    Monocytes Absolute 07/29/2020 0 64  0 17 - 1 22 Thousand/µL Final    Eosinophils Absolute 07/29/2020 0 10  0 00 - 0 61 Thousand/µL Final    Basophils Absolute 07/29/2020 0 06  0 00 - 0 10 Thousands/µL Final    TSH 3RD GENERATON 07/29/2020 2 300  0 358 - 3 740 uIU/mL Final      Using supplements with high doses of biotin 20 to more than 300 times greater than the adequate daily intake for adults of 30 mcg/day as established by the [place] [employer], can cause falsely depress results      HIV-1/HIV-2 Ab 07/29/2020 Non-Reactive  Non-Reactive Final

## 2020-08-11 ENCOUNTER — ANESTHESIA EVENT (OUTPATIENT)
Dept: ANESTHESIOLOGY | Facility: HOSPITAL | Age: 50
End: 2020-08-11

## 2020-08-11 ENCOUNTER — ANESTHESIA (OUTPATIENT)
Dept: ANESTHESIOLOGY | Facility: HOSPITAL | Age: 50
End: 2020-08-11

## 2020-08-25 ENCOUNTER — ANESTHESIA (OUTPATIENT)
Dept: GASTROENTEROLOGY | Facility: AMBULARY SURGERY CENTER | Age: 50
End: 2020-08-25

## 2020-08-25 ENCOUNTER — ANESTHESIA EVENT (OUTPATIENT)
Dept: GASTROENTEROLOGY | Facility: AMBULARY SURGERY CENTER | Age: 50
End: 2020-08-25

## 2020-08-25 ENCOUNTER — HOSPITAL ENCOUNTER (OUTPATIENT)
Dept: GASTROENTEROLOGY | Facility: AMBULARY SURGERY CENTER | Age: 50
Setting detail: OUTPATIENT SURGERY
Discharge: HOME | End: 2020-08-25
Attending: INTERNAL MEDICINE | Admitting: INTERNAL MEDICINE
Payer: COMMERCIAL

## 2020-08-25 VITALS
RESPIRATION RATE: 13 BRPM | BODY MASS INDEX: 22.82 KG/M2 | HEART RATE: 66 BPM | TEMPERATURE: 97.3 F | DIASTOLIC BLOOD PRESSURE: 75 MMHG | OXYGEN SATURATION: 100 % | HEIGHT: 65 IN | WEIGHT: 137 LBS | SYSTOLIC BLOOD PRESSURE: 108 MMHG

## 2020-08-25 DIAGNOSIS — Z12.11 SCREENING FOR COLON CANCER: ICD-10-CM

## 2020-08-25 PROCEDURE — 88305 TISSUE EXAM BY PATHOLOGIST: CPT | Performed by: PATHOLOGY

## 2020-08-25 PROCEDURE — 45385 COLONOSCOPY W/LESION REMOVAL: CPT | Performed by: INTERNAL MEDICINE

## 2020-08-25 PROCEDURE — 45380 COLONOSCOPY AND BIOPSY: CPT | Performed by: INTERNAL MEDICINE

## 2020-08-25 RX ORDER — SODIUM CHLORIDE, SODIUM LACTATE, POTASSIUM CHLORIDE, CALCIUM CHLORIDE 600; 310; 30; 20 MG/100ML; MG/100ML; MG/100ML; MG/100ML
125 INJECTION, SOLUTION INTRAVENOUS CONTINUOUS
Status: CANCELLED | OUTPATIENT
Start: 2020-08-25

## 2020-08-25 RX ORDER — MIDAZOLAM HYDROCHLORIDE 2 MG/2ML
INJECTION, SOLUTION INTRAMUSCULAR; INTRAVENOUS AS NEEDED
Status: DISCONTINUED | OUTPATIENT
Start: 2020-08-25 | End: 2020-08-25

## 2020-08-25 RX ORDER — PROPOFOL 10 MG/ML
INJECTION, EMULSION INTRAVENOUS AS NEEDED
Status: DISCONTINUED | OUTPATIENT
Start: 2020-08-25 | End: 2020-08-25

## 2020-08-25 RX ORDER — SODIUM CHLORIDE, SODIUM LACTATE, POTASSIUM CHLORIDE, CALCIUM CHLORIDE 600; 310; 30; 20 MG/100ML; MG/100ML; MG/100ML; MG/100ML
INJECTION, SOLUTION INTRAVENOUS CONTINUOUS PRN
Status: DISCONTINUED | OUTPATIENT
Start: 2020-08-25 | End: 2020-08-25

## 2020-08-25 RX ORDER — LIDOCAINE HYDROCHLORIDE 10 MG/ML
0.5 INJECTION, SOLUTION EPIDURAL; INFILTRATION; INTRACAUDAL; PERINEURAL ONCE AS NEEDED
Status: CANCELLED | OUTPATIENT
Start: 2020-08-25

## 2020-08-25 RX ADMIN — SODIUM CHLORIDE, SODIUM LACTATE, POTASSIUM CHLORIDE, AND CALCIUM CHLORIDE: .6; .31; .03; .02 INJECTION, SOLUTION INTRAVENOUS at 13:37

## 2020-08-25 RX ADMIN — PROPOFOL 30 MG: 10 INJECTION, EMULSION INTRAVENOUS at 13:59

## 2020-08-25 RX ADMIN — PROPOFOL 40 MG: 10 INJECTION, EMULSION INTRAVENOUS at 13:52

## 2020-08-25 RX ADMIN — PROPOFOL 30 MG: 10 INJECTION, EMULSION INTRAVENOUS at 13:58

## 2020-08-25 RX ADMIN — PROPOFOL 30 MG: 10 INJECTION, EMULSION INTRAVENOUS at 13:56

## 2020-08-25 RX ADMIN — PROPOFOL 20 MG: 10 INJECTION, EMULSION INTRAVENOUS at 13:46

## 2020-08-25 RX ADMIN — PROPOFOL 20 MG: 10 INJECTION, EMULSION INTRAVENOUS at 13:51

## 2020-08-25 RX ADMIN — PROPOFOL 40 MG: 10 INJECTION, EMULSION INTRAVENOUS at 13:48

## 2020-08-25 RX ADMIN — PROPOFOL 30 MG: 10 INJECTION, EMULSION INTRAVENOUS at 13:54

## 2020-08-25 RX ADMIN — PROPOFOL 130 MG: 10 INJECTION, EMULSION INTRAVENOUS at 13:45

## 2020-08-25 RX ADMIN — PROPOFOL 20 MG: 10 INJECTION, EMULSION INTRAVENOUS at 13:49

## 2020-08-25 RX ADMIN — MIDAZOLAM HYDROCHLORIDE 2 MG: 1 INJECTION, SOLUTION INTRAMUSCULAR; INTRAVENOUS at 13:41

## 2020-08-25 NOTE — ANESTHESIA POSTPROCEDURE EVALUATION
Post-Op Assessment Note    CV Status:  Stable  Pain Score: 0    Pain management: adequate     Mental Status:  Sleepy and arousable   Hydration Status:  Euvolemic   PONV Controlled:  Controlled   Airway Patency:  Patent      Post Op Vitals Reviewed: Yes      Staff: CRNA         No complications documented      BP  109/71   Temp 96 6   Pulse 97   Resp 16   SpO2 98

## 2020-08-25 NOTE — H&P
History and Physical - [facility]  Caridad Pierce 48 y o  male MRN: [id number]        HPI:  77-year-old male with no significant past medical history was referred for colonoscopy  Regular bowel movements and denies any blood or mucus in the stool  Historical Information   Past Medical History:   Diagnosis Date    Allergic rhinitis      Past Surgical History:   Procedure Laterality Date    VASECTOMY       Social History   Social History     Substance and Sexual Activity   Alcohol Use Yes    Alcohol/week: 21 0 - 23 0 standard drinks    Types: 6 Cans of beer, 5 Shots of liquor, 10 - 12 Standard drinks or equivalent per week    Frequency: 4 or more times a week    Binge frequency: Daily or almost daily     Social History     Substance and Sexual Activity   Drug Use No     Social History     Tobacco Use   Smoking Status Current Some Day Smoker    Types: Cigars   Smokeless Tobacco Never Used   Tobacco Comment    cigars 4-5x/week     Family History   Problem Relation Age of Onset    No Known Problems Mother     No Known Problems Father        Meds/Allergies     (Not in a hospital admission)      Allergies   Allergen Reactions    Other Allergic Rhinitis     seasonal       Objective     Blood pressure (!) 162/108, pulse 87, temperature 97 6 °F (36 4 °C), temperature source Temporal, resp  rate 18, height 5' 5" (1 651 m), weight 62 1 kg (137 lb), SpO2 98 %      PHYSICAL EXAM:    Gen: NAD  CV: S1 & S2 normal, RRR  CHEST: Clear to auscultate  ABD: soft, NT/ND, good bowel sounds  EXT: no edema    ASSESSMENT:     Screening for colon cancer    PLAN:    Colonoscopy

## 2020-08-25 NOTE — ANESTHESIA PREPROCEDURE EVALUATION
Procedure:  COLONOSCOPY    Relevant Problems   CARDIO   (+) Pure hypercholesterolemia      Other   (+) Alcohol use (Frequently drinks socially, often up to "10 drinks a day  Last drink 2 days ago ")        Physical Exam    Airway    Mallampati score: II  TM Distance: >3 FB  Neck ROM: full     Dental   No notable dental hx     Cardiovascular  Rhythm: regular, Rate: normal, Cardiovascular exam normal    Pulmonary  Pulmonary exam normal Breath sounds clear to auscultation,     Other Findings        Anesthesia Plan  ASA Score- 2     Anesthesia Type- IV sedation with anesthesia with ASA Monitors  Additional Monitors:   Airway Plan:     Comment: Discussed risks/benefits, including medication reactions, awareness, aspiration, and serious/life threatening complications  Plan to maintain native airway with IVGA, monitored with EtCO2  Plan Factors-Exercise tolerance (METS): >4 METS  Patient summary reviewed  Patient instructed to abstain from smoking on day of procedure  Patient did not smoke on day of surgery  Induction- intravenous  Postoperative Plan- Plan for postoperative opioid use  Planned trial extubation    Informed Consent- Anesthetic plan and risks discussed with patient  I personally reviewed this patient with the CRNA  Discussed and agreed on the Anesthesia Plan with the CRNA  Daniel Pryro

## 2020-08-25 NOTE — DISCHARGE INSTRUCTIONS
Colorectal Polyps   WHAT YOU NEED TO KNOW:   Colorectal polyps are small growths of tissue in the lining of the colon and rectum  Most polyps are hyperplastic polyps and are usually benign (noncancerous)  Certain types of polyps, called adenomatous polyps, may turn into cancer  DISCHARGE INSTRUCTIONS:   Follow up with your healthcare provider or gastroenterologist as directed: You may need to return for more tests, such as another colonoscopy  Write down your questions so you remember to ask them during your visits  Reduce your risk for colorectal polyps:   · Eat a variety of healthy foods:  Healthy foods include fruit, vegetables, whole-grain breads, low-fat dairy products, beans, lean meat, and fish  Ask if you need to be on a special diet  · Maintain a healthy weight:  Ask your healthcare provider if you need to lose weight and how much you need to lose  Ask for help with a weight loss program     · Exercise:  Begin to exercise slowly and do more as you get stronger  Talk with your healthcare provider before you start an exercise program      · Limit alcohol:  Your risk for polyps increases the more you drink  · Do not smoke: If you smoke, it is never too late to quit  Ask for information about how to stop  For support and more information:   · Roberto Stearns ([facility])  [address], [place] [address]  Phone: 1- 391 - [phone]  Web Address: www digestive  niddk nih gov  Contact your healthcare provider or gastroenterologist if:   · You have a fever  · You have chills, a cough, or feel weak and achy  · You have abdominal pain that does not go away or gets worse after you take medicine  · Your abdomen is swollen  · You are losing weight without trying  · You have questions or concerns about your condition or care  Seek care immediately or call 911 if:   · You have sudden shortness of breath       · You have a fast heart rate, fast breathing, or are too dizzy to stand up  · You have severe abdominal pain  · You see blood in your bowel movement  © 2017 [facility] Information is for End User's use only and may not be sold, redistributed or otherwise used for commercial purposes  All illustrations and images included in CareNotes® are the copyrighted property of A D A M , Inc  or Juan Montero  The above information is an  only  It is not intended as medical advice for individual conditions or treatments  Talk to your doctor, nurse or pharmacist before following any medical regimen to see if it is safe and effective for you

## 2020-08-28 ENCOUNTER — TELEPHONE (OUTPATIENT)
Dept: GASTROENTEROLOGY | Facility: AMBULARY SURGERY CENTER | Age: 50
End: 2020-08-28

## 2020-08-28 NOTE — TELEPHONE ENCOUNTER
----- Message from Bailee Pascual MD sent at 8/28/2020 10:39 AM EDT -----    Colon polyps removed came back as precancerous tubular adenoma  Next colonoscopy in 3 years  Patient to call our office for any GI symptoms

## 2020-09-14 ENCOUNTER — TELEPHONE (OUTPATIENT)
Dept: FAMILY MEDICINE CLINIC | Facility: CLINIC | Age: 50
End: 2020-09-14

## 2020-09-14 NOTE — TELEPHONE ENCOUNTER
Looks like they tried to call him on 8/28 but left message   The polyps that he removed did come back as precancerous, recommend repeat colonoscopy in 3 years

## 2020-09-14 NOTE — TELEPHONE ENCOUNTER
Patient  Called asking if there were any updates on Colonoscopy results  Please advise  He stated he tried calling Dr Hair Castellano office but no one is returning his call   [phone]

## 2020-09-21 ENCOUNTER — APPOINTMENT (EMERGENCY)
Dept: RADIOLOGY | Facility: HOSPITAL | Age: 50
End: 2020-09-21
Payer: COMMERCIAL

## 2020-09-21 ENCOUNTER — HOSPITAL ENCOUNTER (EMERGENCY)
Facility: HOSPITAL | Age: 50
Discharge: HOME | End: 2020-09-21
Attending: EMERGENCY MEDICINE | Admitting: EMERGENCY MEDICINE
Payer: COMMERCIAL

## 2020-09-21 ENCOUNTER — OFFICE VISIT (OUTPATIENT)
Dept: FAMILY MEDICINE CLINIC | Facility: CLINIC | Age: 50
End: 2020-09-21
Payer: COMMERCIAL

## 2020-09-21 VITALS
WEIGHT: 144.4 LBS | RESPIRATION RATE: 18 BRPM | OXYGEN SATURATION: 95 % | SYSTOLIC BLOOD PRESSURE: 138 MMHG | TEMPERATURE: 98.2 F | BODY MASS INDEX: 24.03 KG/M2 | DIASTOLIC BLOOD PRESSURE: 86 MMHG | HEART RATE: 102 BPM

## 2020-09-21 VITALS
HEIGHT: 65 IN | RESPIRATION RATE: 18 BRPM | WEIGHT: 142 LBS | BODY MASS INDEX: 23.66 KG/M2 | HEART RATE: 102 BPM | DIASTOLIC BLOOD PRESSURE: 112 MMHG | SYSTOLIC BLOOD PRESSURE: 164 MMHG | OXYGEN SATURATION: 97 % | TEMPERATURE: 98.6 F

## 2020-09-21 DIAGNOSIS — F10.239 ALCOHOL WITHDRAWAL SYNDROME WITH COMPLICATION (HCC): Primary | ICD-10-CM

## 2020-09-21 DIAGNOSIS — Z72.89 ALCOHOL USE: ICD-10-CM

## 2020-09-21 DIAGNOSIS — F10.230 ALCOHOL WITHDRAWAL SYNDROME WITHOUT COMPLICATION (HCC): Primary | ICD-10-CM

## 2020-09-21 DIAGNOSIS — R07.9 CHEST PAIN, UNSPECIFIED TYPE: ICD-10-CM

## 2020-09-21 DIAGNOSIS — R03.0 ELEVATED BLOOD PRESSURE READING: ICD-10-CM

## 2020-09-21 LAB
ANION GAP SERPL CALCULATED.3IONS-SCNC: 11 MMOL/L (ref 4–13)
ATRIAL RATE: 91 BPM
BASOPHILS # BLD AUTO: 0.05 THOUSANDS/ΜL (ref 0–0.1)
BASOPHILS NFR BLD AUTO: 1 % (ref 0–1)
BUN SERPL-MCNC: 9 MG/DL (ref 5–25)
CALCIUM SERPL-MCNC: 9.5 MG/DL (ref 8.3–10.1)
CHLORIDE SERPL-SCNC: 97 MMOL/L (ref 100–108)
CO2 SERPL-SCNC: 28 MMOL/L (ref 21–32)
CREAT SERPL-MCNC: 0.9 MG/DL (ref 0.6–1.3)
EOSINOPHIL # BLD AUTO: 0 THOUSAND/ΜL (ref 0–0.61)
EOSINOPHIL NFR BLD AUTO: 0 % (ref 0–6)
ERYTHROCYTE [DISTWIDTH] IN BLOOD BY AUTOMATED COUNT: 12.4 % (ref 11.6–15.1)
GFR SERPL CREATININE-BSD FRML MDRD: 99 ML/MIN/1.73SQ M
GLUCOSE SERPL-MCNC: 201 MG/DL (ref 65–140)
HCT VFR BLD AUTO: 45.9 % (ref 36.5–49.3)
HGB BLD-MCNC: 15.8 G/DL (ref 12–17)
IMM GRANULOCYTES # BLD AUTO: 0.03 THOUSAND/UL (ref 0–0.2)
IMM GRANULOCYTES NFR BLD AUTO: 0 % (ref 0–2)
LYMPHOCYTES # BLD AUTO: 0.67 THOUSANDS/ΜL (ref 0.6–4.47)
LYMPHOCYTES NFR BLD AUTO: 8 % (ref 14–44)
MCH RBC QN AUTO: 33.4 PG (ref 26.8–34.3)
MCHC RBC AUTO-ENTMCNC: 34.4 G/DL (ref 31.4–37.4)
MCV RBC AUTO: 97 FL (ref 82–98)
MONOCYTES # BLD AUTO: 0.66 THOUSAND/ΜL (ref 0.17–1.22)
MONOCYTES NFR BLD AUTO: 8 % (ref 4–12)
NEUTROPHILS # BLD AUTO: 7.19 THOUSANDS/ΜL (ref 1.85–7.62)
NEUTS SEG NFR BLD AUTO: 83 % (ref 43–75)
NRBC BLD AUTO-RTO: 0 /100 WBCS
P AXIS: 56 DEGREES
PLATELET # BLD AUTO: 169 THOUSANDS/UL (ref 149–390)
PMV BLD AUTO: 8.9 FL (ref 8.9–12.7)
POTASSIUM SERPL-SCNC: 4.3 MMOL/L (ref 3.5–5.3)
PR INTERVAL: 166 MS
QRS AXIS: 24 DEGREES
QRSD INTERVAL: 76 MS
QT INTERVAL: 336 MS
QTC INTERVAL: 405 MS
RBC # BLD AUTO: 4.73 MILLION/UL (ref 3.88–5.62)
SODIUM SERPL-SCNC: 136 MMOL/L (ref 136–145)
T WAVE AXIS: 37 DEGREES
TROPONIN I SERPL-MCNC: <0.02 NG/ML
VENTRICULAR RATE: 87 BPM
WBC # BLD AUTO: 8.6 THOUSAND/UL (ref 4.31–10.16)

## 2020-09-21 PROCEDURE — 99291 CRITICAL CARE FIRST HOUR: CPT | Performed by: EMERGENCY MEDICINE

## 2020-09-21 PROCEDURE — 36415 COLL VENOUS BLD VENIPUNCTURE: CPT | Performed by: EMERGENCY MEDICINE

## 2020-09-21 PROCEDURE — 80048 BASIC METABOLIC PNL TOTAL CA: CPT | Performed by: EMERGENCY MEDICINE

## 2020-09-21 PROCEDURE — 84484 ASSAY OF TROPONIN QUANT: CPT | Performed by: EMERGENCY MEDICINE

## 2020-09-21 PROCEDURE — 3080F DIAST BP >= 90 MM HG: CPT | Performed by: FAMILY MEDICINE

## 2020-09-21 PROCEDURE — 93005 ELECTROCARDIOGRAM TRACING: CPT

## 2020-09-21 PROCEDURE — 71045 X-RAY EXAM CHEST 1 VIEW: CPT

## 2020-09-21 PROCEDURE — 99285 EMERGENCY DEPT VISIT HI MDM: CPT | Performed by: EMERGENCY MEDICINE

## 2020-09-21 PROCEDURE — 99215 OFFICE O/P EST HI 40 MIN: CPT | Performed by: FAMILY MEDICINE

## 2020-09-21 PROCEDURE — NC001 PR NO CHARGE: Performed by: EMERGENCY MEDICINE

## 2020-09-21 PROCEDURE — 99285 EMERGENCY DEPT VISIT HI MDM: CPT

## 2020-09-21 PROCEDURE — 85025 COMPLETE CBC W/AUTO DIFF WBC: CPT | Performed by: EMERGENCY MEDICINE

## 2020-09-21 PROCEDURE — 93010 ELECTROCARDIOGRAM REPORT: CPT | Performed by: INTERNAL MEDICINE

## 2020-09-21 PROCEDURE — 96365 THER/PROPH/DIAG IV INF INIT: CPT

## 2020-09-21 RX ORDER — PHENOBARBITAL SODIUM 65 MG/ML
130 INJECTION INTRAMUSCULAR ONCE
Status: COMPLETED | OUTPATIENT
Start: 2020-09-21 | End: 2020-09-21

## 2020-09-21 RX ORDER — SODIUM CHLORIDE 9 MG/ML
3 INJECTION INTRAVENOUS
Status: DISCONTINUED | OUTPATIENT
Start: 2020-09-21 | End: 2020-09-21 | Stop reason: HOSPADM

## 2020-09-21 RX ADMIN — PHENOBARBITAL SODIUM 130 MG: 65 INJECTION INTRAMUSCULAR; INTRAVENOUS at 15:29

## 2020-09-21 RX ADMIN — PHENOBARBITAL SODIUM 260 MG: 65 INJECTION INTRAMUSCULAR; INTRAVENOUS at 14:17

## 2020-09-21 NOTE — PROGRESS NOTES
FAMILY MEDICINE PROGRESS NOTE  Sheryl West 48 y o  male   DATE: September 21, 2020     ASSESSMENT and PLAN:  Sheryl West is a 48 y o  male with:     Problem List Items Addressed This Visit        Other    Alcohol use    Relevant Orders    Transfer to other facility      Other Visit Diagnoses     Alcohol withdrawal syndrome with complication ([facility] [address] )    -  Primary    Relevant Orders    Transfer to other facility    Elevated blood pressure reading        Relevant Orders    Transfer to other facility        Long discussion with patient in regards to current symptoms and elevated blood pressure reading of 164/112  Discussed that I believe he is going through alcohol withdrawal, CIWA score 16 mostly for agitation, restlessness, etc   No hallucinations at this time  Given acute withdrawal and elevated blood pressure, recommend ER evaluation  Patient did not want to be transported by ambulance, I called his wife, Cristian Park, who will be transporting the patient to the hospital   Will follow-up after ER evaluation patient is not admitted  SUBJECTIVE:  Sheryl West is a 48 y o  male who presents today with a chief complaint of Alcohol Intoxication (detoxing )  Patient presents for evaluation of alcohol use disorder  Patient had normal liver function tests in June 2020  Patient has a history of heavy alcohol use  Patient states in the past he went through a period where he drink because he would go through DTs  At that time patient did attended [place] [address] meeting after intervention by his brother and sister, but states he did not return because he thought it was like a cold  Patient states in the past he has quit cold turkey and lasted for 7 days  Previously patient has reported that he drinks approximately 10 beers per day    He has been weaning his alcohol use and yesterday he had 4 beers and 4 shots of whiskey last night between 5pm and 9pm, his last alcoholic drink was at 9pm   States he drank a lot over this weekend because he had a fight with his wife  Patient came here this morning because if he did not he knew he was going to have another drink  Since this morning he has been having intermittent chest pain, tremors, agitation, sweats, restlessness mild nausea without vomiting,  Denies shortness of breath, headaches, auditory hallucinations, visual hallucinations  Review of Systems   Constitutional: Positive for diaphoresis  Respiratory: Positive for chest tightness  Cardiovascular: Positive for chest pain and palpitations  Psychiatric/Behavioral: Positive for agitation  The patient is nervous/anxious and is hyperactive  I have reviewed the patient's Past Medical History  Current Outpatient Medications:     Fexofenadine HCl (ALLEGRA ALLERGY PO), Take by mouth, Disp: , Rfl:     multivitamin (THERAGRAN) TABS, Take 1 tablet by mouth daily, Disp: , Rfl:     OBJECTIVE:  BP (!) 164/112   Pulse 102   Temp 98 6 °F (37 °C)   Resp 18   Ht 5' 5" (1 651 m)   Wt 64 4 kg (142 lb)   SpO2 97%   BMI 23 63 kg/m²    Physical Exam  Vitals signs reviewed  Constitutional:       General: He is in acute distress  Appearance: He is obese  He is not diaphoretic  Comments: Flushed   HENT:      Head: Normocephalic and atraumatic  Cardiovascular:      Rate and Rhythm: Normal rate and regular rhythm  Pulses: Normal pulses  Heart sounds: No murmur  Pulmonary:      Effort: Pulmonary effort is normal  No respiratory distress  Breath sounds: Normal breath sounds  Neurological:      Mental Status: He is alert        Comments: Resting tremor   Psychiatric:         Mood and Affect: Mood normal          Behavior: Behavior normal        I have spent 45 minutes with Patient today in which greater than 50% of this time was spent in counseling/coordination of care regarding Risks and benefits of tx options, Instructions for management, Patient and family education, Importance of treatment compliance and [place]  Magalys Kate MD    Note: Portions of the record have been created with voice recognition software  Occasional wrong word or "sound a like" substitutions may have occurred due to the inherent limitations of voice recognition software  Read the chart carefully and recognize, using context, where substitutions have occurred

## 2020-09-21 NOTE — CONSULTS
Consultation - Medical Toxicology  Debbi Klinefelter 48 y o  male MRN: [id number]  Unit/Bed#: TR 30A Encounter: [id number]      Reason for Consult / Principal Problem: ethanol withdrawal   Inpatient consult to Toxicology  Consult performed by: Ken Barnes DO  Consult ordered by: Senait Mcdonnell DO        09/21/20      ASSESSMENT:  48year-old male with moderate ethanol withdrawal      RECOMMENDATIONS:  Please continue supportive care  Based on SEWS score 8 (on anxiety, tremor, tachycardia), please give initial dose of phenobarbital 260 mg IV  Please continue medical evaluation of chest pain and provide referral for psychiatry based on his night tremors, and refer for addiction services  3:08 PM  Patient still with tremor, anxiety, and DBP > 90 mmHG  HR improving to 90s  Additional phenobarbital 130 mg IVP ordered by me now  Please re-evaluated 30 minutes and administer additional 130 mg IV push for continued symptoms  Please return if further treatment team's necessary  For further questions, please call [facility] or Patient Access Center to reach the medical  on call  Please see additional teaching note below (if available)    Ethanol Withdrawal  Department of Medical Toxicology  [facility] [address]    Updated June 2019    Ethanol withdrawal can be precipitated by sudden discontinuation of chronic ethanol use  Symptoms of ethanol withdrawal syndrome include tremor, anxiety, headache, palpitations, insomnia, nausea and vomiting, diaphoresis, tachycardia and hypertension  In severe cases, seizures may also occur  Seizures are usually brief and generalized, and often occur within 6-12 hours after cessation of ethanol use  Each time someone goes through ethanol withdrawal, it is generally worse secondary to the Kindling Effect  Sympathetic nervous system overactivity may progress to delirium tremens    Delirium tremens is a life-threatening condition that is characterized by tachycardia, diaphoresis, hyperthermia, delirium and hallucinations  It usually occurs about 48-72 hours after discontinuation of heavy ethanol use  If not treated appropriately, significant morbidity and mortality can be associated  The pathophysiology in ethanol dependence is associated with downregulation of GABAa receptors and upregulation of NMDA receptors  This is secondary to ethanol's continuous RITA agonism and [address] antagonism  When ethanol use is discontinued, this resulting state leads to the hyperexcitation associated with the withdrawal syndrome  Treatment is primarily targeted at decreasing the excitatory state with sedatives, such as benzodiazepines and phenobarbital   Adjunctive treatments may include dexmedetomidine or ketamine  Propofol may also be utilized in cases where the airway is protected  Other complications to consider in the setting of ethanol dependence include hypoglycemia, alcoholic ketoacidosis, Wernicke's Encephalopahty and Wernicke-Korsakoff Syndrome  It is important to routinely provide nutrition, hydration and often thiamine  Treatment regimen utilized by Department of Medical Toxicology involves application of the Severity of Ethanol Withdrawal Scale: PHENOBARBITAL FOR ALCOHOL WITHDRAWAL:    1) MILD SEWS SCORE (1-6)  a  Administer diazepam 10 mg PO X1 (unless unable to take PO)  b  Administer phenobarbital 65 mg IV x1 and then another 65 mg IV q60 min PRN (max 5 doses total)  * Document SEWS with each dose and/or minimum of q2h  HOLD any further phenobarbital dosing for RASS -4 or -5    2) MODERATE SEWS SCORE (7-12)  a  Administer diazepam 10 mg PO X1 (unless unable to take PO)  b  Administer phenobarbital 260 mg IV x1 dose  c  Administer phenobarbital 130 mg IV q30 min PRN (max 5 doses)  * Document SEWS with each dose and/or minimum of q1h    HOLD any further phenobarbital dosing for RASS -4 or -5    3) SEVERE SEWS SCORE (13 or higher)  a  Administer diazepam 10 mg PO X1 (unless unable to take PO)  b  If this is the INITIAL SEWS SCORE administer phenobarbital 650 mg IVPB over 15 minutes x1  If this is NOT INITIAL SEWS SCORE administer phenobarbital 260 mg IV, and then another 260 mg IV q15 min PRN (max 3 doses total)  c  Administer phenobarbital 130 mg IV q30 min PRN (max 5 doses)  * Document SEWS with each dose and/or minimum of q30 min  HOLD any further phenobarbital for RASS -4 or -5  HPI: Luiza Barajas is a 48y o  year old male who presents with ethanol withdrawal   He reports consuming ethanol daily for the last 30 years  He states that he usually starts drinking in the morning and then finishes at night, and sleeps in between drinking  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  He generally drinks beer and whiskey  He has cut down to 8 drinks per day during the last couple days  He currently reports anxiety, tremor, difficulty sleeping  Additionally, he has chest pain today which is being evaluated by Primary emergency medicine team     Patient has a history of ethanol withdrawal, has been hospitalized for treatment, but he has never had seizures  Review of Systems   Constitutional: Negative for fever  HENT: Negative  Eyes: Negative  Respiratory: Negative  Cardiovascular: Positive for chest pain  Gastrointestinal: Negative  Negative for nausea  Genitourinary: Negative  Musculoskeletal: Negative  Neurological: Positive for tremors  Negative for seizures  Psychiatric/Behavioral: Positive for sleep disturbance  Negative for hallucinations, self-injury and suicidal ideas  The patient is nervous/anxious          Historical Information   Past Medical History:   Diagnosis Date    Allergic rhinitis      Past Surgical History:   Procedure Laterality Date    VASECTOMY       Social History   Social History     Substance and Sexual Activity   Alcohol Use Yes    Alcohol/week: 21 0 - 23 0 standard drinks    Types: 6 Cans of beer, 5 Shots of liquor, 10 - 12 Standard drinks or equivalent per week    Frequency: 4 or more times a week    Drinks per session: 10 or more    Binge frequency: Daily or almost daily     Social History     Substance and Sexual Activity   Drug Use No     Social History     Tobacco Use   Smoking Status Current Some Day Smoker    Types: Cigars   Smokeless Tobacco Never Used   Tobacco Comment    cigars 4-5x/week     Family History   Problem Relation Age of Onset    No Known Problems Mother     No Known Problems Father         Prior to Admission medications    Medication Sig Start Date End Date Taking? Authorizing Provider   Fexofenadine HCl (ALLEGRA ALLERGY PO) Take by mouth    Historical Provider, MD   multivitamin (THERAGRAN) TABS Take 1 tablet by mouth daily    Historical Provider, MD       Current Facility-Administered Medications   Medication Dose Route Frequency    PHENobarbital 65 mg/mL injection 130 mg  130 mg Intravenous Once    sodium chloride (PF) 0 9 % injection 3 mL  3 mL Intravenous Q1H PRN       Allergies   Allergen Reactions    Other Allergic Rhinitis     seasonal       Objective     No intake or output data in the 24 hours ending 09/21/20 1508    Invasive Devices:   Peripheral IV 09/21/20 Right Antecubital (Active)       Vitals   Vitals:    09/21/20 1246 09/21/20 1330 09/21/20 1420 09/21/20 1430   BP: 153/96 150/86 148/91 148/91   TempSrc: Oral      Pulse: 86 (!) 112 86 92   Resp: 16  18    Patient Position - Orthostatic VS: Sitting  Lying    Temp: 98 2 °F (36 8 °C)          Physical Exam  Vitals signs and nursing note reviewed  HENT:      Head: Normocephalic  Nose: Nose normal       Mouth/Throat:      Mouth: Mucous membranes are moist    Eyes:      Extraocular Movements: Extraocular movements intact  Conjunctiva/sclera: Conjunctivae normal       Pupils: Pupils are equal, round, and reactive to light     Neck:      Musculoskeletal: Normal range of motion  Cardiovascular:      Rate and Rhythm: Regular rhythm  Tachycardia present  Pulses: Normal pulses  Pulmonary:      Effort: Pulmonary effort is normal  No respiratory distress  Abdominal:      General: Abdomen is flat  Palpations: Abdomen is soft  Tenderness: There is no abdominal tenderness  Musculoskeletal: Normal range of motion  Skin:     General: Skin is warm and dry  Neurological:      General: No focal deficit present  Mental Status: He is alert and oriented to person, place, and time  Motor: Tremor present  No weakness or abnormal muscle tone  Psychiatric:         Attention and Perception: Attention and perception normal          Mood and Affect: Mood is anxious  Speech: Speech normal          Behavior: Behavior normal          Thought Content: Thought content normal            EKG, Pathology, and Other Studies: I have personally reviewed pertinent reports  and EKG unremarkable    Lab Results: I have personally reviewed pertinent reports  Labs:  Results from last 7 days   Lab Units 09/21/20  1334   WBC Thousand/uL 8 60   HEMOGLOBIN g/dL 15 8   HEMATOCRIT % 45 9   PLATELETS Thousands/uL 169   NEUTROS PCT % 83*   LYMPHS PCT % 8*   MONOS PCT % 8      Results from last 7 days   Lab Units 09/21/20  1334   SODIUM mmol/L 136   POTASSIUM mmol/L 4 3   CHLORIDE mmol/L 97*   CO2 mmol/L 28   BUN mg/dL 9   CREATININE mg/dL 0 90   CALCIUM mg/dL 9 5              0   Lab Value Date/Time    TROPONINI <0 02 09/21/2020 1334                 Imaging Studies: I have personally reviewed pertinent reports          Minutes of critical care time 45  -Critical care time was exclusive of seperately billable procedures and teaching time    -Critical care was necessary to treat or prevent imminent or life-threatening deterioration of the following condition: CNS failure/comprimise, toxidrome   -Critical care time was spent personally by me on the following activities as well as the above as per the course and rest of chart: obtaining history from patient/surrogate, developement of a treatment plan, discussions with primary provider(s), evaluation of patient's response to the treatment, examination of the patient, recommending treatements and interventions, re-evaluation of the patient's condition, review of old charts, recommending/reviewing laboratory studies, recommending/reviewing of radiographic studies

## 2020-09-21 NOTE — ED PROVIDER NOTES
History  Chief Complaint   Patient presents with    Withdrawal - Alcohol     patient states they stopped drinking last night and started with chest pain 'states it feels like an elephant on chest'  Patient states "I normally drink ten a day" beer and whisky  Denies chest pain at the moment  Patient also sherie denies n/v/d no haullucating      Patient is a 54-year-old male with a history of alcohol abuse who presents with chest pain  Patient states that he normally drinks 74-30 alcoholic beverages per day  He states that he is cut that amount in half over the past few days  He has not had any alcohol since yesterday  He states that he is trying to quit drinking  Today, he developed a tremor and chest pain  He suspected the chest pain was secondary to alcohol withdrawal but became concerned  He came to the emergency department for evaluation of chest pain as well as treatment of alcohol withdrawal   Patient states that he wants to stop drinking but is not interested in inpatient detox  He admits to anxiety but denies shortness of breath, nausea, vomiting, headache, diaphoresis her or other complaints  History provided by:  Patient  Chest Pain   Pain location:  L chest  Pain radiates to:  Does not radiate  Pain radiates to the back: no    Pain severity:  Mild  Timing:  Constant  Progression:  Resolved  Ineffective treatments:  None tried  Associated symptoms: no abdominal pain, no back pain, no cough, no diaphoresis, no dizziness, no dysphagia, no fever, no headache, no nausea, no palpitations, no shortness of breath and not vomiting        Prior to Admission Medications   Prescriptions Last Dose Informant Patient Reported? Taking?    Fexofenadine HCl (ALLEGRA ALLERGY PO)  Self Yes No   Sig: Take by mouth   multivitamin (THERAGRAN) TABS  Self Yes No   Sig: Take 1 tablet by mouth daily      Facility-Administered Medications: None       Past Medical History:   Diagnosis Date    Allergic rhinitis        Past Surgical History:   Procedure Laterality Date    VASECTOMY         Family History   Problem Relation Age of Onset    No Known Problems Mother     No Known Problems Father      I have reviewed and agree with the history as documented  E-Cigarette/Vaping    E-Cigarette Use Never User      E-Cigarette/Vaping Substances     Social History     Tobacco Use    Smoking status: Current Some Day Smoker     Types: Cigars    Smokeless tobacco: Never Used    Tobacco comment: cigars 4-5x/week   Substance Use Topics    Alcohol use: Yes     Alcohol/week: 21 0 - 23 0 standard drinks     Types: 6 Cans of beer, 5 Shots of liquor, 10 - 12 Standard drinks or equivalent per week     Frequency: 4 or more times a week     Drinks per session: 10 or more     Binge frequency: Daily or almost daily    Drug use: No       Review of Systems   Constitutional: Negative for chills, diaphoresis and fever  HENT: Negative for nosebleeds, sore throat and trouble swallowing  Eyes: Negative for photophobia, pain and visual disturbance  Respiratory: Negative for cough, chest tightness and shortness of breath  Cardiovascular: Positive for chest pain  Negative for palpitations and leg swelling  Gastrointestinal: Negative for abdominal pain, constipation, diarrhea, nausea and vomiting  Endocrine: Negative for polydipsia and polyuria  Genitourinary: Negative for difficulty urinating, dysuria and hematuria  Musculoskeletal: Negative for back pain, neck pain and neck stiffness  Skin: Negative for pallor and rash  Neurological: Negative for dizziness, syncope, light-headedness and headaches  All other systems reviewed and are negative  Physical Exam  Physical Exam  Vitals signs and nursing note reviewed  Constitutional:       General: He is not in acute distress  Appearance: He is well-developed  HENT:      Head: Normocephalic and atraumatic  Eyes:      Pupils: Pupils are equal, round, and reactive to light  Neck:      Musculoskeletal: Normal range of motion and neck supple  Cardiovascular:      Rate and Rhythm: Regular rhythm  Tachycardia present  Pulses: Normal pulses  Heart sounds: Normal heart sounds  Pulmonary:      Effort: Pulmonary effort is normal  No respiratory distress  Breath sounds: Normal breath sounds  Abdominal:      General: There is no distension  Palpations: Abdomen is soft  Abdomen is not rigid  Tenderness: There is no abdominal tenderness  There is no guarding or rebound  Musculoskeletal: Normal range of motion  General: No tenderness  Lymphadenopathy:      Cervical: No cervical adenopathy  Skin:     General: Skin is warm and dry  Capillary Refill: Capillary refill takes less than 2 seconds  Neurological:      Mental Status: He is alert and oriented to person, place, and time  Cranial Nerves: No cranial nerve deficit  Sensory: No sensory deficit  Comments: Mild resting tremor in bilateral UE   Psychiatric:         Mood and Affect: Mood is anxious           Vital Signs  ED Triage Vitals   Temperature Pulse Respirations Blood Pressure SpO2   09/21/20 1246 09/21/20 1149 09/21/20 1149 09/21/20 1149 09/21/20 1149   98 2 °F (36 8 °C) 94 18 (!) 172/106 99 %      Temp Source Heart Rate Source Patient Position - Orthostatic VS BP Location FiO2 (%)   09/21/20 1246 09/21/20 1246 09/21/20 1149 09/21/20 1149 --   Oral Monitor Sitting Left arm       Pain Score       09/21/20 1420       1           Vitals:    09/21/20 1430 09/21/20 1445 09/21/20 1500 09/21/20 1545   BP: 148/91 136/92 143/90 138/86   Pulse: 92 94 102    Patient Position - Orthostatic VS:             Visual Acuity      ED Medications  Medications   PHENobarbital 65 mg/mL 260 mg in sodium chloride 0 9 % 100 mL IVPB (0 mg Intravenous Stopped 9/21/20 1528)   PHENobarbital 65 mg/mL injection 130 mg (130 mg Intravenous Given 9/21/20 1529)       Diagnostic Studies  Results Reviewed Procedure Component Value Units Date/Time    Troponin I [[id number]]  (Normal) Collected:  09/21/20 1334    Lab Status:  Final result Specimen:  Blood from Arm, Right Updated:  09/21/20 1411     Troponin I <0 02 ng/mL     Basic metabolic panel [[id number]]  (Abnormal) Collected:  09/21/20 1334    Lab Status:  Final result Specimen:  Blood from Arm, Right Updated:  09/21/20 1403     Sodium 136 mmol/L      Potassium 4 3 mmol/L      Chloride 97 mmol/L      CO2 28 mmol/L      ANION GAP 11 mmol/L      BUN 9 mg/dL      Creatinine 0 90 mg/dL      Glucose 201 mg/dL      Calcium 9 5 mg/dL      eGFR 99 ml/min/1 73sq m     Narrative:       [place] guidelines for Chronic Kidney Disease (CKD):     Stage 1 with normal or high GFR (GFR > 90 mL/min/1 73 square meters)    Stage 2 Mild CKD (GFR = 60-89 mL/min/1 73 square meters)    Stage 3A Moderate CKD (GFR = 45-59 mL/min/1 73 square meters)    Stage 3B Moderate CKD (GFR = 30-44 mL/min/1 73 square meters)    Stage 4 Severe CKD (GFR = 15-29 mL/min/1 73 square meters)    Stage 5 End Stage CKD (GFR <15 mL/min/1 73 square meters)  Note: GFR calculation is accurate only with a steady state creatinine    CBC and differential [[id number]]  (Abnormal) Collected:  09/21/20 1334    Lab Status:  Final result Specimen:  Blood from Arm, Right Updated:  09/21/20 1347     WBC 8 60 Thousand/uL      RBC 4 73 Million/uL      Hemoglobin 15 8 g/dL      Hematocrit 45 9 %      MCV 97 fL      MCH 33 4 pg      MCHC 34 4 g/dL      RDW 12 4 %      MPV 8 9 fL      Platelets 427 Thousands/uL      nRBC 0 /100 WBCs      Neutrophils Relative 83 %      Immat GRANS % 0 %      Lymphocytes Relative 8 %      Monocytes Relative 8 %      Eosinophils Relative 0 %      Basophils Relative 1 %      Neutrophils Absolute 7 19 Thousands/µL      Immature Grans Absolute 0 03 Thousand/uL      Lymphocytes Absolute 0 67 Thousands/µL      Monocytes Absolute 0 66 Thousand/µL      Eosinophils Absolute 0 00 Thousand/µL      Basophils Absolute 0 05 Thousands/µL                  XR chest 1 view portable   Final Result by Andrés Patel MD (09/21 5416)      No acute cardiopulmonary disease  Workstation performed: [id number]                    Procedures  ECG 12 Lead Documentation Only    Date/Time: 9/21/2020 2:20 PM  Performed by: Tona Dial DO  Authorized by: Tona Dial DO     ECG reviewed by me, the ED Provider: yes    Patient location:  ED  Previous ECG:     Previous ECG:  Unavailable    Comparison to cardiac monitor: Yes    Comments:      Normal sinus rhythm at a rate of 87 beats per minute  Normal intervals  Normal axis  Normal QRS  No ST T wave abnormalities  Similar to previous from 01/31/2019  CriticalCare Time  Performed by: Tona Dial DO  Authorized by: Tona Dial DO     Critical care provider statement:     Critical care start time:  9/21/2020 2:45 PM    Critical care end time:  9/21/2020 3:45 PM    Critical care time was exclusive of:  Separately billable procedures and treating other patients    Critical care was necessary to treat or prevent imminent or life-threatening deterioration of the following conditions:  Circulatory failure    Critical care was time spent personally by me on the following activities:  Obtaining history from patient or surrogate, development of treatment plan with patient or surrogate, discussions with consultants, evaluation of patient's response to treatment, examination of patient, ordering and performing treatments and interventions, ordering and review of laboratory studies, re-evaluation of patient's condition, ordering and review of radiographic studies and review of old charts             ED Course  ED Course as of Sep 26 0719   Mon Sep 21, 4087 4693 Diastolic blood pressure 82 mmHg  No residual tremor  Heart rate 99  Patient requesting discharge    Does not wish to stay additional treatment, including troponin 2 MDM  Number of Diagnoses or Management Options  Alcohol withdrawal syndrome without complication [facility]): new and requires workup  Chest pain, unspecified type: new and requires workup  Diagnosis management comments: Patient with a history of alcohol abuse presents with chief complaint of chest pain  Chest pain resolved prior to my evaluation  EKG and initial troponin do not indicate acute ischemia  Patient is low risk by HEART score and I discussed repeat troponin at 3:00 a m  However patient declines  He understands risks of declining repeat troponin, including major adverse cardiac events  He is willing to accept risk of major adverse cardiac events and is agreeable to outpatient follow-up  Patient was treated for alcohol withdrawal   Toxicology was consulted and initiated IV phenobarbital   Patient received 2 doses  His symptoms improved and patient requested discharge  Patient continued to decline inpatient detox after multiple conversations with myself and    He was given outpatient resources  He was advised to follow up with those resources and return to ED if symptoms worsen         Amount and/or Complexity of Data Reviewed  Clinical lab tests: ordered and reviewed  Tests in the radiology section of CPT®: ordered and reviewed  Tests in the medicine section of CPT®: ordered and reviewed  Review and summarize past medical records: yes  Discuss the patient with other providers: yes  Independent visualization of images, tracings, or specimens: yes    Risk of Complications, Morbidity, and/or Mortality  Presenting problems: high  Diagnostic procedures: moderate  Management options: high    Patient Progress  Patient progress: improved      Disposition  Final diagnoses:   Alcohol withdrawal syndrome without complication ([address] )   Chest pain, unspecified type     Time reflects when diagnosis was documented in both MDM as applicable and the Disposition within this note     Time User Action Codes Description Comment    9/21/2020  3:44 PM Elina Canal Add [F10 230] Alcohol withdrawal syndrome without complication ([facility] [address] )     6/30/9825  3:44 PM Elina Canal Add [R07 9] Chest pain, unspecified type       ED Disposition     ED Disposition Condition Date/Time Comment    Discharge Stable Mon Sep 21, 2020  3:44 PM Carol Reese discharge to home/self care  Follow-up Information     Follow up With Specialties Details Why [id number] Alex Dorman MD Family Medicine Schedule an appointment as soon as possible for a visit  Return to ED sooner if symptoms worsen or persist  [address] [place] [address]  [phone]            Discharge Medication List as of 9/21/2020  3:44 PM      CONTINUE these medications which have NOT CHANGED    Details   Fexofenadine HCl (ALLEGRA ALLERGY PO) Take by mouth, Historical Med      multivitamin (THERAGRAN) TABS Take 1 tablet by mouth daily, Historical Med           No discharge procedures on file      PDMP Review     None          ED Provider  Electronically Signed by           Ashley Parry DO  09/26/20 7084

## 2020-09-30 ENCOUNTER — TELEPHONE (OUTPATIENT)
Dept: FAMILY MEDICINE CLINIC | Facility: CLINIC | Age: 50
End: 2020-09-30

## 2020-09-30 DIAGNOSIS — N52.8 OTHER MALE ERECTILE DYSFUNCTION: Primary | ICD-10-CM

## 2020-09-30 RX ORDER — TADALAFIL 10 MG/1
10 TABLET ORAL DAILY PRN
Qty: 10 TABLET | Refills: 0 | Status: SHIPPED | OUTPATIENT
Start: 2020-09-30 | End: 2020-10-14 | Stop reason: SDUPTHER

## 2020-09-30 NOTE — TELEPHONE ENCOUNTER
Talked to patient, he was appreciative of the help last week, he has been abstinent from alcohol since our last visit, has replaced alcohol with Sprite and feeling better  Requested trial of Cialis for ED, reviewed possible NATA and discussed that ED will also likely improve since alcohol cessation

## 2020-09-30 NOTE — TELEPHONE ENCOUNTER
Patient next on shingles  List  Please have patient check with insurance  Also make aware of PCP change

## 2020-10-08 ENCOUNTER — CLINICAL SUPPORT (OUTPATIENT)
Dept: FAMILY MEDICINE CLINIC | Facility: CLINIC | Age: 50
End: 2020-10-08
Payer: COMMERCIAL

## 2020-10-08 VITALS — TEMPERATURE: 98 F

## 2020-10-08 DIAGNOSIS — Z23 ENCOUNTER FOR IMMUNIZATION: Primary | ICD-10-CM

## 2020-10-08 PROCEDURE — 90471 IMMUNIZATION ADMIN: CPT

## 2020-10-08 PROCEDURE — 90750 HZV VACC RECOMBINANT IM: CPT

## 2020-10-14 DIAGNOSIS — N52.8 OTHER MALE ERECTILE DYSFUNCTION: ICD-10-CM

## 2020-10-14 RX ORDER — TADALAFIL 10 MG/1
10 TABLET ORAL DAILY PRN
Qty: 30 TABLET | Refills: 3 | Status: SHIPPED | OUTPATIENT
Start: 2020-10-14 | End: 2020-11-12 | Stop reason: SDUPTHER

## 2020-10-24 ENCOUNTER — HOSPITAL ENCOUNTER (EMERGENCY)
Facility: HOSPITAL | Age: 50
Discharge: HOME | End: 2020-10-25
Attending: EMERGENCY MEDICINE
Payer: COMMERCIAL

## 2020-10-24 VITALS
OXYGEN SATURATION: 100 % | WEIGHT: 139.99 LBS | SYSTOLIC BLOOD PRESSURE: 134 MMHG | HEART RATE: 67 BPM | TEMPERATURE: 97.5 F | BODY MASS INDEX: 23.3 KG/M2 | RESPIRATION RATE: 16 BRPM | DIASTOLIC BLOOD PRESSURE: 88 MMHG

## 2020-10-24 DIAGNOSIS — F10.929 ALCOHOL INTOXICATION (HCC): Primary | ICD-10-CM

## 2020-10-24 LAB
ALBUMIN SERPL BCP-MCNC: 4.2 G/DL (ref 3.5–5)
ALP SERPL-CCNC: 61 U/L (ref 46–116)
ALT SERPL W P-5'-P-CCNC: 39 U/L (ref 12–78)
ANION GAP SERPL CALCULATED.3IONS-SCNC: 12 MMOL/L (ref 4–13)
AST SERPL W P-5'-P-CCNC: 34 U/L (ref 5–45)
BASOPHILS # BLD AUTO: 0.07 THOUSANDS/ΜL (ref 0–0.1)
BASOPHILS NFR BLD AUTO: 1 % (ref 0–1)
BILIRUB SERPL-MCNC: 0.29 MG/DL (ref 0.2–1)
BUN SERPL-MCNC: 7 MG/DL (ref 5–25)
CALCIUM SERPL-MCNC: 9.2 MG/DL (ref 8.3–10.1)
CHLORIDE SERPL-SCNC: 101 MMOL/L (ref 100–108)
CO2 SERPL-SCNC: 28 MMOL/L (ref 21–32)
CREAT SERPL-MCNC: 0.99 MG/DL (ref 0.6–1.3)
EOSINOPHIL # BLD AUTO: 0.05 THOUSAND/ΜL (ref 0–0.61)
EOSINOPHIL NFR BLD AUTO: 1 % (ref 0–6)
ERYTHROCYTE [DISTWIDTH] IN BLOOD BY AUTOMATED COUNT: 12.5 % (ref 11.6–15.1)
ETHANOL SERPL-MCNC: 339 MG/DL (ref 0–3)
GFR SERPL CREATININE-BSD FRML MDRD: 88 ML/MIN/1.73SQ M
GLUCOSE SERPL-MCNC: 95 MG/DL (ref 65–140)
HCT VFR BLD AUTO: 48.9 % (ref 36.5–49.3)
HGB BLD-MCNC: 16.6 G/DL (ref 12–17)
IMM GRANULOCYTES # BLD AUTO: 0.04 THOUSAND/UL (ref 0–0.2)
IMM GRANULOCYTES NFR BLD AUTO: 1 % (ref 0–2)
LYMPHOCYTES # BLD AUTO: 1.7 THOUSANDS/ΜL (ref 0.6–4.47)
LYMPHOCYTES NFR BLD AUTO: 27 % (ref 14–44)
MCH RBC QN AUTO: 32.5 PG (ref 26.8–34.3)
MCHC RBC AUTO-ENTMCNC: 33.9 G/DL (ref 31.4–37.4)
MCV RBC AUTO: 96 FL (ref 82–98)
MONOCYTES # BLD AUTO: 0.37 THOUSAND/ΜL (ref 0.17–1.22)
MONOCYTES NFR BLD AUTO: 6 % (ref 4–12)
NEUTROPHILS # BLD AUTO: 3.98 THOUSANDS/ΜL (ref 1.85–7.62)
NEUTS SEG NFR BLD AUTO: 64 % (ref 43–75)
NRBC BLD AUTO-RTO: 0 /100 WBCS
PLATELET # BLD AUTO: 208 THOUSANDS/UL (ref 149–390)
PMV BLD AUTO: 8.7 FL (ref 8.9–12.7)
POTASSIUM SERPL-SCNC: 4 MMOL/L (ref 3.5–5.3)
PROT SERPL-MCNC: 8 G/DL (ref 6.4–8.2)
RBC # BLD AUTO: 5.1 MILLION/UL (ref 3.88–5.62)
SARS-COV-2 RNA RESP QL NAA+PROBE: NEGATIVE
SODIUM SERPL-SCNC: 141 MMOL/L (ref 136–145)
WBC # BLD AUTO: 6.21 THOUSAND/UL (ref 4.31–10.16)

## 2020-10-24 PROCEDURE — 85025 COMPLETE CBC W/AUTO DIFF WBC: CPT | Performed by: EMERGENCY MEDICINE

## 2020-10-24 PROCEDURE — 80053 COMPREHEN METABOLIC PANEL: CPT | Performed by: EMERGENCY MEDICINE

## 2020-10-24 PROCEDURE — 80320 DRUG SCREEN QUANTALCOHOLS: CPT | Performed by: EMERGENCY MEDICINE

## 2020-10-24 PROCEDURE — 96361 HYDRATE IV INFUSION ADD-ON: CPT

## 2020-10-24 PROCEDURE — 96374 THER/PROPH/DIAG INJ IV PUSH: CPT

## 2020-10-24 PROCEDURE — 36415 COLL VENOUS BLD VENIPUNCTURE: CPT | Performed by: EMERGENCY MEDICINE

## 2020-10-24 PROCEDURE — 87635 SARS-COV-2 COVID-19 AMP PRB: CPT | Performed by: EMERGENCY MEDICINE

## 2020-10-24 PROCEDURE — 99284 EMERGENCY DEPT VISIT MOD MDM: CPT | Performed by: EMERGENCY MEDICINE

## 2020-10-24 PROCEDURE — 99285 EMERGENCY DEPT VISIT HI MDM: CPT

## 2020-10-24 RX ORDER — ONDANSETRON 2 MG/ML
4 INJECTION INTRAMUSCULAR; INTRAVENOUS ONCE
Status: COMPLETED | OUTPATIENT
Start: 2020-10-24 | End: 2020-10-24

## 2020-10-24 RX ORDER — CHLORDIAZEPOXIDE HYDROCHLORIDE 25 MG/1
50 CAPSULE, GELATIN COATED ORAL ONCE
Status: COMPLETED | OUTPATIENT
Start: 2020-10-24 | End: 2020-10-24

## 2020-10-24 RX ADMIN — ONDANSETRON 4 MG: 2 INJECTION INTRAMUSCULAR; INTRAVENOUS at 22:42

## 2020-10-24 RX ADMIN — CHLORDIAZEPOXIDE HYDROCHLORIDE 50 MG: 25 CAPSULE ORAL at 22:27

## 2020-10-24 RX ADMIN — SODIUM CHLORIDE 1000 ML: 0.9 INJECTION, SOLUTION INTRAVENOUS at 21:50

## 2020-11-04 ENCOUNTER — TELEMEDICINE (OUTPATIENT)
Dept: FAMILY MEDICINE CLINIC | Facility: CLINIC | Age: 50
End: 2020-11-04
Payer: COMMERCIAL

## 2020-11-04 DIAGNOSIS — F10.21 ALCOHOL USE DISORDER, SEVERE, IN EARLY REMISSION (HCC): ICD-10-CM

## 2020-11-04 DIAGNOSIS — F41.9 ANXIETY: Primary | ICD-10-CM

## 2020-11-04 PROCEDURE — 99213 OFFICE O/P EST LOW 20 MIN: CPT | Performed by: FAMILY MEDICINE

## 2020-11-04 RX ORDER — HYDROXYZINE HYDROCHLORIDE 25 MG/1
25 TABLET, FILM COATED ORAL EVERY 6 HOURS PRN
Qty: 90 TABLET | Refills: 2 | Status: SHIPPED | OUTPATIENT
Start: 2020-11-04 | End: 2021-01-22 | Stop reason: HOSPADM

## 2020-11-12 DIAGNOSIS — N52.8 OTHER MALE ERECTILE DYSFUNCTION: ICD-10-CM

## 2020-11-12 RX ORDER — TADALAFIL 10 MG/1
10 TABLET ORAL DAILY PRN
Qty: 30 TABLET | Refills: 0 | Status: SHIPPED | OUTPATIENT
Start: 2020-11-12 | End: 2020-12-01 | Stop reason: SDUPTHER

## 2020-11-23 ENCOUNTER — OFFICE VISIT (OUTPATIENT)
Dept: FAMILY MEDICINE CLINIC | Facility: CLINIC | Age: 50
End: 2020-11-23
Payer: COMMERCIAL

## 2020-11-23 VITALS
DIASTOLIC BLOOD PRESSURE: 96 MMHG | HEART RATE: 85 BPM | HEIGHT: 66 IN | BODY MASS INDEX: 23.14 KG/M2 | TEMPERATURE: 97.9 F | WEIGHT: 144 LBS | SYSTOLIC BLOOD PRESSURE: 128 MMHG | OXYGEN SATURATION: 96 % | RESPIRATION RATE: 18 BRPM

## 2020-11-23 DIAGNOSIS — F10.21 ALCOHOL USE DISORDER, SEVERE, IN EARLY REMISSION (HCC): Primary | ICD-10-CM

## 2020-11-23 DIAGNOSIS — F41.9 ANXIETY: ICD-10-CM

## 2020-11-23 PROCEDURE — 3080F DIAST BP >= 90 MM HG: CPT | Performed by: FAMILY MEDICINE

## 2020-11-23 PROCEDURE — 99214 OFFICE O/P EST MOD 30 MIN: CPT | Performed by: FAMILY MEDICINE

## 2020-11-23 PROCEDURE — 3008F BODY MASS INDEX DOCD: CPT | Performed by: FAMILY MEDICINE

## 2020-11-23 PROCEDURE — 3074F SYST BP LT 130 MM HG: CPT | Performed by: FAMILY MEDICINE

## 2020-11-23 RX ORDER — FLUOXETINE HYDROCHLORIDE 20 MG/1
20 CAPSULE ORAL DAILY
Qty: 30 CAPSULE | Refills: 5 | Status: SHIPPED | OUTPATIENT
Start: 2020-11-23 | End: 2020-12-22 | Stop reason: SDUPTHER

## 2020-11-23 RX ORDER — THIAMINE MONONITRATE (VIT B1) 100 MG
100 TABLET ORAL DAILY
COMMUNITY
End: 2022-02-08 | Stop reason: ALTCHOICE

## 2020-12-01 DIAGNOSIS — N52.8 OTHER MALE ERECTILE DYSFUNCTION: ICD-10-CM

## 2020-12-01 RX ORDER — TADALAFIL 10 MG/1
10 TABLET ORAL DAILY PRN
Qty: 30 TABLET | Refills: 1 | Status: SHIPPED | OUTPATIENT
Start: 2020-12-01 | End: 2021-01-22 | Stop reason: HOSPADM

## 2020-12-08 ENCOUNTER — CLINICAL SUPPORT (OUTPATIENT)
Dept: FAMILY MEDICINE CLINIC | Facility: CLINIC | Age: 50
End: 2020-12-08
Payer: COMMERCIAL

## 2020-12-08 ENCOUNTER — TELEPHONE (OUTPATIENT)
Dept: FAMILY MEDICINE CLINIC | Facility: CLINIC | Age: 50
End: 2020-12-08

## 2020-12-08 DIAGNOSIS — Z23 ENCOUNTER FOR IMMUNIZATION: Primary | ICD-10-CM

## 2020-12-08 PROCEDURE — 90750 HZV VACC RECOMBINANT IM: CPT

## 2020-12-08 PROCEDURE — 90471 IMMUNIZATION ADMIN: CPT

## 2020-12-18 ENCOUNTER — TELEPHONE (OUTPATIENT)
Dept: FAMILY MEDICINE CLINIC | Facility: CLINIC | Age: 50
End: 2020-12-18

## 2020-12-22 ENCOUNTER — OFFICE VISIT (OUTPATIENT)
Dept: FAMILY MEDICINE CLINIC | Facility: CLINIC | Age: 50
End: 2020-12-22
Payer: COMMERCIAL

## 2020-12-22 VITALS
DIASTOLIC BLOOD PRESSURE: 86 MMHG | BODY MASS INDEX: 22.58 KG/M2 | HEIGHT: 66 IN | SYSTOLIC BLOOD PRESSURE: 136 MMHG | WEIGHT: 140.5 LBS | RESPIRATION RATE: 14 BRPM | TEMPERATURE: 97.6 F | HEART RATE: 88 BPM

## 2020-12-22 DIAGNOSIS — F10.21 ALCOHOL USE DISORDER, SEVERE, IN EARLY REMISSION (HCC): ICD-10-CM

## 2020-12-22 DIAGNOSIS — F41.9 ANXIETY: ICD-10-CM

## 2020-12-22 DIAGNOSIS — F19.90 SUBSTANCE USE DISORDER: ICD-10-CM

## 2020-12-22 DIAGNOSIS — R00.2 PALPITATIONS: Primary | ICD-10-CM

## 2020-12-22 PROCEDURE — 3008F BODY MASS INDEX DOCD: CPT | Performed by: FAMILY MEDICINE

## 2020-12-22 PROCEDURE — 3075F SYST BP GE 130 - 139MM HG: CPT | Performed by: FAMILY MEDICINE

## 2020-12-22 PROCEDURE — 3079F DIAST BP 80-89 MM HG: CPT | Performed by: FAMILY MEDICINE

## 2020-12-22 PROCEDURE — 99214 OFFICE O/P EST MOD 30 MIN: CPT | Performed by: FAMILY MEDICINE

## 2020-12-22 RX ORDER — PROPRANOLOL HYDROCHLORIDE 10 MG/1
10 TABLET ORAL 2 TIMES DAILY PRN
Qty: 60 TABLET | Refills: 2 | Status: SHIPPED | OUTPATIENT
Start: 2020-12-22 | End: 2021-01-07

## 2020-12-22 RX ORDER — FLUOXETINE HYDROCHLORIDE 40 MG/1
40 CAPSULE ORAL DAILY
Qty: 30 CAPSULE | Refills: 3 | Status: SHIPPED | OUTPATIENT
Start: 2020-12-22 | End: 2021-04-08 | Stop reason: SDUPTHER

## 2020-12-24 ENCOUNTER — TELEPHONE (OUTPATIENT)
Dept: FAMILY MEDICINE CLINIC | Facility: CLINIC | Age: 50
End: 2020-12-24

## 2020-12-24 DIAGNOSIS — Z20.822 EXPOSURE TO COVID-19 VIRUS: ICD-10-CM

## 2020-12-24 DIAGNOSIS — Z20.822 EXPOSURE TO COVID-19 VIRUS: Primary | ICD-10-CM

## 2020-12-24 PROCEDURE — U0003 INFECTIOUS AGENT DETECTION BY NUCLEIC ACID (DNA OR RNA); SEVERE ACUTE RESPIRATORY SYNDROME CORONAVIRUS 2 (SARS-COV-2) (CORONAVIRUS DISEASE [COVID-19]), AMPLIFIED PROBE TECHNIQUE, MAKING USE OF HIGH THROUGHPUT TECHNOLOGIES AS DESCRIBED BY CMS-2020-01-R: HCPCS | Performed by: FAMILY MEDICINE

## 2020-12-25 LAB — SARS-COV-2 RNA SPEC QL NAA+PROBE: NOT DETECTED

## 2021-01-07 DIAGNOSIS — R00.2 PALPITATIONS: ICD-10-CM

## 2021-01-07 DIAGNOSIS — F41.9 ANXIETY: ICD-10-CM

## 2021-01-07 RX ORDER — PROPRANOLOL HYDROCHLORIDE 10 MG/1
TABLET ORAL
Qty: 180 TABLET | Refills: 1 | Status: SHIPPED | OUTPATIENT
Start: 2021-01-07 | End: 2021-01-26 | Stop reason: SDUPTHER

## 2021-01-19 ENCOUNTER — HOSPITAL ENCOUNTER (INPATIENT)
Facility: HOSPITAL | Age: 51
LOS: 2 days | DRG: 897 | End: 2021-01-22
Attending: EMERGENCY MEDICINE | Admitting: INTERNAL MEDICINE
Payer: COMMERCIAL

## 2021-01-19 DIAGNOSIS — E87.1 HYPONATREMIA: Primary | ICD-10-CM

## 2021-01-19 DIAGNOSIS — F41.9 ANXIETY: ICD-10-CM

## 2021-01-19 DIAGNOSIS — F10.229: ICD-10-CM

## 2021-01-19 DIAGNOSIS — R45.851 DEPRESSION WITH SUICIDAL IDEATION: ICD-10-CM

## 2021-01-19 DIAGNOSIS — R00.0 TACHYCARDIA: ICD-10-CM

## 2021-01-19 DIAGNOSIS — F10.20 ALCOHOLISM (HCC): ICD-10-CM

## 2021-01-19 DIAGNOSIS — F10.929 ALCOHOL INTOXICATION (HCC): ICD-10-CM

## 2021-01-19 DIAGNOSIS — G47.9 SLEEP DISTURBANCE: ICD-10-CM

## 2021-01-19 DIAGNOSIS — R00.2 PALPITATIONS: ICD-10-CM

## 2021-01-19 DIAGNOSIS — F32.A DEPRESSION WITH SUICIDAL IDEATION: ICD-10-CM

## 2021-01-19 PROBLEM — K52.9 CHRONIC DIARRHEA OF UNKNOWN ORIGIN: Status: ACTIVE | Noted: 2021-01-19

## 2021-01-19 PROBLEM — E87.8 HYPOCHLOREMIA: Status: ACTIVE | Noted: 2021-01-19

## 2021-01-19 LAB
ALBUMIN SERPL BCP-MCNC: 3.6 G/DL (ref 3.5–5)
ALBUMIN SERPL BCP-MCNC: 4.1 G/DL (ref 3.5–5)
ALP SERPL-CCNC: 59 U/L (ref 46–116)
ALP SERPL-CCNC: 65 U/L (ref 46–116)
ALT SERPL W P-5'-P-CCNC: 54 U/L (ref 12–78)
ALT SERPL W P-5'-P-CCNC: 58 U/L (ref 12–78)
AMPHETAMINES SERPL QL SCN: NEGATIVE
ANION GAP SERPL CALCULATED.3IONS-SCNC: 11 MMOL/L (ref 4–13)
ANION GAP SERPL CALCULATED.3IONS-SCNC: 12 MMOL/L (ref 4–13)
APTT PPP: 27 SECONDS (ref 23–37)
APTT PPP: 27 SECONDS (ref 23–37)
AST SERPL W P-5'-P-CCNC: 44 U/L (ref 5–45)
AST SERPL W P-5'-P-CCNC: 51 U/L (ref 5–45)
BARBITURATES UR QL: NEGATIVE
BASOPHILS # BLD AUTO: 0.03 THOUSANDS/ΜL (ref 0–0.1)
BASOPHILS NFR BLD AUTO: 0 % (ref 0–1)
BENZODIAZ UR QL: NEGATIVE
BILIRUB SERPL-MCNC: 0.43 MG/DL (ref 0.2–1)
BILIRUB SERPL-MCNC: 0.59 MG/DL (ref 0.2–1)
BUN SERPL-MCNC: 4 MG/DL (ref 5–25)
BUN SERPL-MCNC: 6 MG/DL (ref 5–25)
CALCIUM SERPL-MCNC: 8.7 MG/DL (ref 8.3–10.1)
CALCIUM SERPL-MCNC: 9 MG/DL (ref 8.3–10.1)
CHLORIDE SERPL-SCNC: 93 MMOL/L (ref 100–108)
CHLORIDE SERPL-SCNC: 99 MMOL/L (ref 100–108)
CO2 SERPL-SCNC: 25 MMOL/L (ref 21–32)
CO2 SERPL-SCNC: 26 MMOL/L (ref 21–32)
COCAINE UR QL: NEGATIVE
CREAT SERPL-MCNC: 0.75 MG/DL (ref 0.6–1.3)
CREAT SERPL-MCNC: 0.76 MG/DL (ref 0.6–1.3)
EOSINOPHIL # BLD AUTO: 0 THOUSAND/ΜL (ref 0–0.61)
EOSINOPHIL NFR BLD AUTO: 0 % (ref 0–6)
ERYTHROCYTE [DISTWIDTH] IN BLOOD BY AUTOMATED COUNT: 12.6 % (ref 11.6–15.1)
ETHANOL EXG-MCNC: 0.07 MG/DL
ETHANOL EXG-MCNC: 0.17 MG/DL
FIBRINOGEN PPP-MCNC: 296 MG/DL (ref 227–495)
FLUAV RNA RESP QL NAA+PROBE: NEGATIVE
FLUBV RNA RESP QL NAA+PROBE: NEGATIVE
GFR SERPL CREATININE-BSD FRML MDRD: 106 ML/MIN/1.73SQ M
GFR SERPL CREATININE-BSD FRML MDRD: 107 ML/MIN/1.73SQ M
GLUCOSE P FAST SERPL-MCNC: 116 MG/DL (ref 65–99)
GLUCOSE SERPL-MCNC: 116 MG/DL (ref 65–140)
GLUCOSE SERPL-MCNC: 123 MG/DL (ref 65–140)
HCT VFR BLD AUTO: 48.6 % (ref 36.5–49.3)
HGB BLD-MCNC: 17.4 G/DL (ref 12–17)
IMM GRANULOCYTES # BLD AUTO: 0.05 THOUSAND/UL (ref 0–0.2)
IMM GRANULOCYTES NFR BLD AUTO: 1 % (ref 0–2)
INR PPP: 0.94 (ref 0.84–1.19)
INR PPP: 0.95 (ref 0.84–1.19)
LYMPHOCYTES # BLD AUTO: 1.07 THOUSANDS/ΜL (ref 0.6–4.47)
LYMPHOCYTES NFR BLD AUTO: 12 % (ref 14–44)
MAGNESIUM SERPL-MCNC: 1.7 MG/DL (ref 1.6–2.6)
MCH RBC QN AUTO: 32 PG (ref 26.8–34.3)
MCHC RBC AUTO-ENTMCNC: 35.8 G/DL (ref 31.4–37.4)
MCV RBC AUTO: 89 FL (ref 82–98)
METHADONE UR QL: NEGATIVE
MONOCYTES # BLD AUTO: 0.99 THOUSAND/ΜL (ref 0.17–1.22)
MONOCYTES NFR BLD AUTO: 11 % (ref 4–12)
NEUTROPHILS # BLD AUTO: 6.89 THOUSANDS/ΜL (ref 1.85–7.62)
NEUTS SEG NFR BLD AUTO: 76 % (ref 43–75)
NRBC BLD AUTO-RTO: 0 /100 WBCS
OPIATES UR QL SCN: NEGATIVE
OXYCODONE+OXYMORPHONE UR QL SCN: NEGATIVE
PCP UR QL: NEGATIVE
PLATELET # BLD AUTO: 238 THOUSANDS/UL (ref 149–390)
PLATELET # BLD AUTO: 272 THOUSANDS/UL (ref 149–390)
PMV BLD AUTO: 8.3 FL (ref 8.9–12.7)
PMV BLD AUTO: 8.4 FL (ref 8.9–12.7)
POTASSIUM SERPL-SCNC: 4.2 MMOL/L (ref 3.5–5.3)
POTASSIUM SERPL-SCNC: 4.4 MMOL/L (ref 3.5–5.3)
PROT SERPL-MCNC: 6.9 G/DL (ref 6.4–8.2)
PROT SERPL-MCNC: 7.8 G/DL (ref 6.4–8.2)
PROTHROMBIN TIME: 12.7 SECONDS (ref 11.6–14.5)
PROTHROMBIN TIME: 12.8 SECONDS (ref 11.6–14.5)
RBC # BLD AUTO: 5.44 MILLION/UL (ref 3.88–5.62)
RSV RNA RESP QL NAA+PROBE: NEGATIVE
SARS-COV-2 RNA RESP QL NAA+PROBE: NEGATIVE
SODIUM SERPL-SCNC: 130 MMOL/L (ref 136–145)
SODIUM SERPL-SCNC: 136 MMOL/L (ref 136–145)
THC UR QL: NEGATIVE
TROPONIN I SERPL-MCNC: <0.02 NG/ML
TSH SERPL DL<=0.05 MIU/L-ACNC: 1.59 UIU/ML (ref 0.36–3.74)
WBC # BLD AUTO: 9.03 THOUSAND/UL (ref 4.31–10.16)

## 2021-01-19 PROCEDURE — 84443 ASSAY THYROID STIM HORMONE: CPT | Performed by: EMERGENCY MEDICINE

## 2021-01-19 PROCEDURE — 80053 COMPREHEN METABOLIC PANEL: CPT | Performed by: EMERGENCY MEDICINE

## 2021-01-19 PROCEDURE — 85025 COMPLETE CBC W/AUTO DIFF WBC: CPT | Performed by: EMERGENCY MEDICINE

## 2021-01-19 PROCEDURE — 85670 THROMBIN TIME PLASMA: CPT | Performed by: INTERNAL MEDICINE

## 2021-01-19 PROCEDURE — 82075 ASSAY OF BREATH ETHANOL: CPT | Performed by: INTERNAL MEDICINE

## 2021-01-19 PROCEDURE — 85732 THROMBOPLASTIN TIME PARTIAL: CPT | Performed by: INTERNAL MEDICINE

## 2021-01-19 PROCEDURE — 96374 THER/PROPH/DIAG INJ IV PUSH: CPT

## 2021-01-19 PROCEDURE — 85384 FIBRINOGEN ACTIVITY: CPT | Performed by: INTERNAL MEDICINE

## 2021-01-19 PROCEDURE — 80307 DRUG TEST PRSMV CHEM ANLYZR: CPT | Performed by: EMERGENCY MEDICINE

## 2021-01-19 PROCEDURE — 85610 PROTHROMBIN TIME: CPT | Performed by: INTERNAL MEDICINE

## 2021-01-19 PROCEDURE — 85730 THROMBOPLASTIN TIME PARTIAL: CPT | Performed by: EMERGENCY MEDICINE

## 2021-01-19 PROCEDURE — 84484 ASSAY OF TROPONIN QUANT: CPT | Performed by: EMERGENCY MEDICINE

## 2021-01-19 PROCEDURE — 80053 COMPREHEN METABOLIC PANEL: CPT | Performed by: INTERNAL MEDICINE

## 2021-01-19 PROCEDURE — 0241U HB NFCT DS VIR RESP RNA 4 TRGT: CPT | Performed by: EMERGENCY MEDICINE

## 2021-01-19 PROCEDURE — 85730 THROMBOPLASTIN TIME PARTIAL: CPT | Performed by: INTERNAL MEDICINE

## 2021-01-19 PROCEDURE — 99220 PR INITIAL OBSERVATION CARE/DAY 70 MINUTES: CPT | Performed by: INTERNAL MEDICINE

## 2021-01-19 PROCEDURE — 36415 COLL VENOUS BLD VENIPUNCTURE: CPT | Performed by: EMERGENCY MEDICINE

## 2021-01-19 PROCEDURE — 85611 PROTHROMBIN TEST: CPT | Performed by: INTERNAL MEDICINE

## 2021-01-19 PROCEDURE — 99285 EMERGENCY DEPT VISIT HI MDM: CPT | Performed by: EMERGENCY MEDICINE

## 2021-01-19 PROCEDURE — 96361 HYDRATE IV INFUSION ADD-ON: CPT

## 2021-01-19 PROCEDURE — 85610 PROTHROMBIN TIME: CPT | Performed by: EMERGENCY MEDICINE

## 2021-01-19 PROCEDURE — 93005 ELECTROCARDIOGRAM TRACING: CPT

## 2021-01-19 PROCEDURE — 83735 ASSAY OF MAGNESIUM: CPT | Performed by: INTERNAL MEDICINE

## 2021-01-19 PROCEDURE — 82075 ASSAY OF BREATH ETHANOL: CPT | Performed by: EMERGENCY MEDICINE

## 2021-01-19 PROCEDURE — 85049 AUTOMATED PLATELET COUNT: CPT | Performed by: INTERNAL MEDICINE

## 2021-01-19 PROCEDURE — 99285 EMERGENCY DEPT VISIT HI MDM: CPT

## 2021-01-19 RX ORDER — FLUOXETINE HYDROCHLORIDE 20 MG/1
40 CAPSULE ORAL DAILY
Status: DISCONTINUED | OUTPATIENT
Start: 2021-01-19 | End: 2021-01-20

## 2021-01-19 RX ORDER — LORAZEPAM 2 MG/ML
1 INJECTION INTRAMUSCULAR ONCE
Status: COMPLETED | OUTPATIENT
Start: 2021-01-19 | End: 2021-01-19

## 2021-01-19 RX ORDER — FOLIC ACID 1 MG/1
1 TABLET ORAL DAILY
Status: DISCONTINUED | OUTPATIENT
Start: 2021-01-20 | End: 2021-01-22 | Stop reason: HOSPADM

## 2021-01-19 RX ORDER — LOPERAMIDE HYDROCHLORIDE 2 MG/1
2 CAPSULE ORAL 3 TIMES DAILY PRN
Status: DISCONTINUED | OUTPATIENT
Start: 2021-01-19 | End: 2021-01-22 | Stop reason: HOSPADM

## 2021-01-19 RX ORDER — PROPRANOLOL HYDROCHLORIDE 20 MG/1
10 TABLET ORAL 2 TIMES DAILY
Status: DISCONTINUED | OUTPATIENT
Start: 2021-01-20 | End: 2021-01-21

## 2021-01-19 RX ORDER — THIAMINE MONONITRATE (VIT B1) 100 MG
100 TABLET ORAL DAILY
Status: DISCONTINUED | OUTPATIENT
Start: 2021-01-20 | End: 2021-01-22 | Stop reason: HOSPADM

## 2021-01-19 RX ORDER — LORAZEPAM 1 MG/1
2 TABLET ORAL ONCE
Status: COMPLETED | OUTPATIENT
Start: 2021-01-20 | End: 2021-01-19

## 2021-01-19 RX ORDER — SODIUM CHLORIDE 9 MG/ML
125 INJECTION, SOLUTION INTRAVENOUS CONTINUOUS
Status: DISCONTINUED | OUTPATIENT
Start: 2021-01-19 | End: 2021-01-20

## 2021-01-19 RX ORDER — PROPRANOLOL HYDROCHLORIDE 20 MG/1
10 TABLET ORAL ONCE
Status: COMPLETED | OUTPATIENT
Start: 2021-01-19 | End: 2021-01-19

## 2021-01-19 RX ORDER — FLUOXETINE HYDROCHLORIDE 20 MG/1
40 CAPSULE ORAL DAILY
Status: DISCONTINUED | OUTPATIENT
Start: 2021-01-20 | End: 2021-01-20

## 2021-01-19 RX ORDER — SODIUM CHLORIDE 9 MG/ML
75 INJECTION, SOLUTION INTRAVENOUS CONTINUOUS
Status: DISCONTINUED | OUTPATIENT
Start: 2021-01-19 | End: 2021-01-20

## 2021-01-19 RX ADMIN — LORAZEPAM 1 MG: 2 INJECTION INTRAMUSCULAR; INTRAVENOUS at 16:34

## 2021-01-19 RX ADMIN — FLUOXETINE 40 MG: 20 CAPSULE ORAL at 16:34

## 2021-01-19 RX ADMIN — PROPRANOLOL HYDROCHLORIDE 10 MG: 20 TABLET ORAL at 16:34

## 2021-01-19 RX ADMIN — LORAZEPAM 1 MG: 2 INJECTION INTRAMUSCULAR; INTRAVENOUS at 19:22

## 2021-01-19 RX ADMIN — SODIUM CHLORIDE 1000 ML: 0.9 INJECTION, SOLUTION INTRAVENOUS at 16:45

## 2021-01-19 RX ADMIN — LORAZEPAM 2 MG: 1 TABLET ORAL at 23:56

## 2021-01-19 NOTE — ED NOTES
EVS (Eligibility Verification System) called - [phone]  Automated system indicates: not on file

## 2021-01-19 NOTE — ED NOTES
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

## 2021-01-19 NOTE — ED NOTES
Patient was transferred from hallway number 45 to [facility] bed number 46 per Technician at this time  Patient has no further complaints or needs        Jacqueline Dickersonr  01/19/21 7258

## 2021-01-19 NOTE — ED PROVIDER NOTES
History  Chief Complaint   Patient presents with    Suicidal     States "my wife cheated on me so I got really drunk " Admits to drinking daily  Binge drinking since saturday  Unknown of last drink  Pt c/o SI but denies having a plan stating "no they took my pistol "      Patient is a 72-year-old male who presents to the emergency department relating that in October his wife cheated on him  He gestures to a tattoo on his ring finger and relates that "she is the love of my life and I am not hers "  He admits to drinking heavily after she cheated and additionally tells me that he can stop drinking any time he wants to  He shares that 3 days ago he saw a post on [employer] picture of his wife with this other person and he became very upset  He admits to increasing his consumption of alcohol  He relates that he is alone in his home and describes this being extremely difficult - given COVID pandemic  He relates that he came in today as family and friends expressed concern that he would undergo DTs  (Denies hx of withdrawal seizures)  He reports feeling slightly tremulous at this time and shares that he ran out of beer  He estimates that he last consumed beer last night  He shares that he previously drank whiskey heavily and then switched to beer  He denies specifically having tried stopping drinking last night but rather having had this occur due to lack of alcohol  He does admit to having had thoughts and expressed these to others of suicidal ideation  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 [employer]  He does not feel that there is anything left for him to accomplish  He relates that he does have a pistol & considered using this to end his life  He denies having ever done anything to attempt taking his life  He does share that he has had a lot of anxiety recently  He is prescribed Prozac as well of propanolol and he noted the propanolol especially has made a huge difference    The anxiety frequently causes some palpitations and this helped to control him  He has been off of his medications for several days  In questioning how he hopes we could help him he did not have a specific answer though pointed out he is having tremors  I inquired if he was interested in inpatient rehab  He notes that he does not care for the term "alcoholic" and that he did already complete an inpatient rehab stay and that the program otherwise does not work for him  I inquired if he was interested in outpatient treatment and he indicated that he was recently in this for 3 hours daily 3 days a week  He describes logging on and hearing people talk about how terrible their days were and not appreciating any benefit from this  He indicated significant confidence that he could stop drinking on his own but then upon reflection of the idea of 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 Taking?    FLUoxetine (PROzac) 40 MG capsule   No Yes   Sig: Take 1 capsule (40 mg total) by mouth daily   Fexofenadine HCl (ALLEGRA ALLERGY PO) Not Taking at Unknown time Self Yes No   Sig: Take by mouth   NON FORMULARY  Self Yes No   Sig: Take 2 each by mouth 2 (two) times a day De stress balls-ASHWAGANDHA-ginseng and lemon balm   hydrOXYzine HCL (ATARAX) 25 mg tablet Not Taking at Unknown time Self No No   Sig: Take 1 tablet (25 mg total) by mouth every 6 (six) hours as needed for anxiety   Patient not taking: Reported on 1/19/2021   multivitamin (THERAGRAN) TABS  Self Yes Yes   Sig: Take 1 tablet by mouth daily propranolol (INDERAL) 10 mg tablet   No Yes   Sig: take 1 tablet by mouth twice a day if needed for anxiety or PALPITATIONS   tadalafil (CIALIS) 10 MG tablet Not Taking at Unknown time Self No No   Sig: Take 1 tablet (10 mg total) by mouth daily as needed for erectile dysfunction   Patient not taking: Reported on 12/22/2020   thiamine (VITAMIN B1) 100 mg tablet  Self Yes Yes   Sig: Take 100 mg by mouth daily      Facility-Administered Medications: None       Past Medical History:   Diagnosis Date    Alcoholic ([facility] [address] )     Allergic rhinitis        Past Surgical History:   Procedure Laterality Date    VASECTOMY         Family History   Problem Relation Age of Onset    No Known Problems Mother     No Known Problems Father      I have reviewed and agree with the history as documented  E-Cigarette/Vaping    E-Cigarette Use Never User      E-Cigarette/Vaping Substances     Social History     Tobacco Use    Smoking status: Current Some Day Smoker     Types: Cigars    Smokeless tobacco: Never Used    Tobacco comment: cigars 4-5x/week   Substance Use Topics    Alcohol use: Not Currently     Comment: 4 days sober    Drug use: No       Review of Systems   Constitutional: Positive for appetite change (eating minimally)  Negative for unexpected weight change  Gastrointestinal: Negative for abdominal pain, nausea and vomiting  All other systems reviewed and are negative  Physical Exam  Physical Exam  Vitals signs and nursing note reviewed  Constitutional:       Comments: Pt  W/ mild intoxication  HENT:      Head: Normocephalic  Eyes:      General: No scleral icterus  Extraocular Movements: Extraocular movements intact  Conjunctiva/sclera: Conjunctivae normal    Cardiovascular:      Rate and Rhythm: Regular rhythm  Tachycardia present  Pulmonary:      Effort: Pulmonary effort is normal       Breath sounds: Normal breath sounds  Abdominal:      Palpations: Abdomen is soft        Tenderness: There is no abdominal tenderness  Skin:     General: Skin is warm and dry  Neurological:      General: No focal deficit present  Mental Status: He is alert and oriented to person, place, and time  Comments: Very slight hand tremor    Psychiatric:         Attention and Perception: Attention normal          Mood and Affect: Affect is labile (at times sad and alternately energetic & explaining how varied treatments/therapies will not be of any help)           Vital Signs  ED Triage Vitals   Temperature Pulse Respirations Blood Pressure SpO2   01/19/21 1400 01/19/21 1400 01/19/21 1400 01/19/21 1400 01/19/21 1400   98 9 °F (37 2 °C) (!) 124 18 147/99 100 %      Temp Source Heart Rate Source Patient Position - Orthostatic VS BP Location FiO2 (%)   01/19/21 1400 01/19/21 1400 01/19/21 1400 01/19/21 1400 --   Temporal Monitor Sitting Left arm       Pain Score       01/19/21 2032       No Pain           Vitals:    01/19/21 2030 01/19/21 2032 01/19/21 2227 01/20/21 0000   BP: 133/88 133/88  142/96   Pulse: (!) 111 (!) 111 105 101   Patient Position - Orthostatic VS:  Sitting  Sitting         Visual Acuity      ED Medications  Medications   FLUoxetine (PROzac) capsule 40 mg (40 mg Oral Given 1/19/21 1634)   FLUoxetine (PROzac) capsule 40 mg (has no administration in time range)   propranolol (INDERAL) tablet 10 mg (has no administration in time range)   thiamine (VITAMIN B1) tablet 100 mg (has no administration in time range)   folic acid (FOLVITE) tablet 1 mg (has no administration in time range)   multivitamin-minerals (CENTRUM) tablet 1 tablet (has no administration in time range)   folic acid 1 mg, thiamine (VITAMIN B1) 100 mg in sodium chloride 0 9 % 100 mL IV piggyback (has no administration in time range)   sodium chloride 0 9 % infusion (75 mL/hr Intravenous New Bag 1/20/21 0008)   loperamide (IMODIUM) capsule 2 mg (has no administration in time range)   sodium chloride 0 9 % bolus 1,000 mL (0 mL Intravenous Stopped 1/19/21 1734)   LORazepam (ATIVAN) injection 1 mg (1 mg Intravenous Given 1/19/21 1634)   propranolol (INDERAL) tablet 10 mg (10 mg Oral Given 1/19/21 1634)   LORazepam (ATIVAN) injection 1 mg (1 mg Intravenous Given 1/19/21 1922)   LORazepam (ATIVAN) tablet 2 mg (2 mg Oral Given 1/19/21 2356)       Diagnostic Studies  Results Reviewed     Procedure Component Value Units Date/Time    Thrombin Time Mixing Study [[id number]] Collected: 01/19/21 2019    Lab Status: Final result Specimen: Blood from Arm, Right Updated: 01/20/21 0041     Thrombin Time Mixing Room Temp --     Thrombin Time Mixing Incubated --     Thrombin Time 15 5 seconds     Equal mix, PT / INR [[id number]] Collected: 01/19/21 2019    Lab Status: Final result Specimen: Blood from Arm, Right Updated: 01/20/21 0040     PT Mixing Study Room Temp --     PT Mixing Study Incubated --     Protime 12 8 seconds     Thrombin time [[id number]]  (Normal) Collected: 01/19/21 2019    Lab Status: Final result Specimen: Blood from Arm, Right Updated: 01/20/21 0023     Thrombin Time 15 0 seconds     Comprehensive metabolic panel [[id number]]  (Abnormal) Collected: 01/19/21 2019    Lab Status: Final result Specimen: Blood from Arm, Right Updated: 01/19/21 2044     Sodium 136 mmol/L      Potassium 4 2 mmol/L      Chloride 99 mmol/L      CO2 26 mmol/L      ANION GAP 11 mmol/L      BUN 6 mg/dL      Creatinine 0 76 mg/dL      Glucose 116 mg/dL      Glucose, Fasting 116 mg/dL      Calcium 8 7 mg/dL      AST 44 U/L      ALT 54 U/L      Alkaline Phosphatase 59 U/L      Total Protein 6 9 g/dL      Albumin 3 6 g/dL      Total Bilirubin 0 59 mg/dL      eGFR 106 ml/min/1 73sq m     Narrative:      [facility] guidelines for Chronic Kidney Disease (CKD):     Stage 1 with normal or high GFR (GFR > 90 mL/min/1 73 square meters)    Stage 2 Mild CKD (GFR = 60-89 mL/min/1 73 square meters)    Stage 3A Moderate CKD (GFR = 45-59 mL/min/1 73 square meters)    Stage 3B Moderate CKD (GFR = 30-44 mL/min/1 73 square meters)    Stage 4 Severe CKD (GFR = 15-29 mL/min/1 73 square meters)    Stage 5 End Stage CKD (GFR <15 mL/min/1 73 square meters)  Note: GFR calculation is accurate only with a steady state creatinine    Magnesium [[id number]]  (Normal) Collected: 01/19/21 2019    Lab Status: Final result Specimen: Blood from Arm, Right Updated: 01/19/21 2044     Magnesium 1 7 mg/dL     Protime-INR [[id number]]  (Normal) Collected: 01/19/21 2019    Lab Status: Final result Specimen: Blood from Arm, Right Updated: 01/19/21 2035     Protime 12 8 seconds      INR 0 95    APTT [[id number]]  (Normal) Collected: 01/19/21 2019    Lab Status: Final result Specimen: Blood from Arm, Right Updated: 01/19/21 2035     PTT 27 seconds     Fibrinogen [[id number]]  (Normal) Collected: 01/19/21 2019    Lab Status: Final result Specimen: Blood from Arm, Right Updated: 01/19/21 2035     Fibrinogen 296 mg/dL     Platelet count [[id number]]  (Abnormal) Collected: 01/19/21 2019    Lab Status: Final result Specimen: Blood from Arm, Right Updated: 01/19/21 2027     Platelets 357 Thousands/uL      MPV 8 4 fL     Equal mix, APTT [[id number]] Collected: 01/19/21 2019    Lab Status: In process Specimen: Blood from Arm, Right Updated: 01/19/21 2023    POCT alcohol breath test [[id number]]  (Normal) Resulted: 01/19/21 1927    Lab Status: Final result Updated: 01/19/21 1927     EXTBreath Alcohol 0 067    COVID19, Influenza A/B, RSV PCR, [facility] [[id number]]  (Normal) Collected: 01/19/21 1629    Lab Status: Final result Specimen: Nares from Nasopharyngeal Swab Updated: 01/19/21 1725     SARS-CoV-2 Negative     INFLUENZA A PCR Negative     INFLUENZA B PCR Negative     RSV PCR Negative    Narrative: This test has been authorized by FDA under an EUA (Emergency Use Assay) for use by authorized laboratories    Clinical caution and judgement should be used with the interpretation of these results with consideration of the clinical impression and other laboratory testing  Testing reported as "Positive" or "Negative" has been proven to be accurate according to standard laboratory validation requirements  All testing is performed with control materials showing appropriate reactivity at standard intervals  Comprehensive metabolic panel [[id number]]  (Abnormal) Collected: 01/19/21 1629    Lab Status: Final result Specimen: Blood from Arm, Right Updated: 01/19/21 1702     Sodium 130 mmol/L      Potassium 4 4 mmol/L      Chloride 93 mmol/L      CO2 25 mmol/L      ANION GAP 12 mmol/L      BUN 4 mg/dL      Creatinine 0 75 mg/dL      Glucose 123 mg/dL      Calcium 9 0 mg/dL      AST 51 U/L      ALT 58 U/L      Alkaline Phosphatase 65 U/L      Total Protein 7 8 g/dL      Albumin 4 1 g/dL      Total Bilirubin 0 43 mg/dL      eGFR 107 ml/min/1 73sq m     Narrative:      [facility] guidelines for Chronic Kidney Disease (CKD):     Stage 1 with normal or high GFR (GFR > 90 mL/min/1 73 square meters)    Stage 2 Mild CKD (GFR = 60-89 mL/min/1 73 square meters)    Stage 3A Moderate CKD (GFR = 45-59 mL/min/1 73 square meters)    Stage 3B Moderate CKD (GFR = 30-44 mL/min/1 73 square meters)    Stage 4 Severe CKD (GFR = 15-29 mL/min/1 73 square meters)    Stage 5 End Stage CKD (GFR <15 mL/min/1 73 square meters)  Note: GFR calculation is accurate only with a steady state creatinine    TSH [[id number]]  (Normal) Collected: 01/19/21 1629    Lab Status: Final result Specimen: Blood from Arm, Right Updated: 01/19/21 1702     TSH 3RD GENERATON 1 595 uIU/mL     Narrative:      Patients undergoing fluorescein dye angiography may retain small amounts of fluorescein in the body for 48-72 hours post procedure  Samples containing fluorescein can produce falsely depressed TSH values  If the patient had this procedure,a specimen should be resubmitted post fluorescein clearance        Troponin I [[id number]]  (Normal) Collected: 01/19/21 1629    Lab Status: Final result Specimen: Blood from Arm, Right Updated: 01/19/21 1658     Troponin I <0 02 ng/mL     Rapid drug screen, urine [[id number]]  (Normal) Collected: 01/19/21 1559    Lab Status: Final result Specimen: Urine, Clean Catch Updated: 01/19/21 1650     Amph/Meth UR Negative     Barbiturate Ur Negative     Benzodiazepine Urine Negative     Cocaine Urine Negative     Methadone Urine Negative     Opiate Urine Negative     PCP Ur Negative     THC Urine Negative     Oxycodone Urine Negative    Narrative:      FOR MEDICAL PURPOSES ONLY  IF CONFIRMATION NEEDED PLEASE CONTACT THE LAB WITHIN 5 DAYS      Drug Screen Cutoff Levels:  AMPHETAMINE/METHAMPHETAMINES  1000 ng/mL  BARBITURATES     200 ng/mL  BENZODIAZEPINES     200 ng/mL  COCAINE      300 ng/mL  METHADONE      300 ng/mL  OPIATES      300 ng/mL  PHENCYCLIDINE     25 ng/mL  THC       50 ng/mL  OXYCODONE      100 ng/mL    Protime-INR [[id number]]  (Normal) Collected: 01/19/21 1629    Lab Status: Final result Specimen: Blood from Arm, Right Updated: 01/19/21 1647     Protime 12 7 seconds      INR 0 94    APTT [[id number]]  (Normal) Collected: 01/19/21 1629    Lab Status: Final result Specimen: Blood from Arm, Right Updated: 01/19/21 1647     PTT 27 seconds     POCT alcohol breath test [[id number]]  (Normal) Resulted: 01/19/21 1646    Lab Status: Final result Updated: 01/19/21 1646     EXTBreath Alcohol 0 167    CBC and differential [[id number]]  (Abnormal) Collected: 01/19/21 1629    Lab Status: Final result Specimen: Blood from Arm, Right Updated: 01/19/21 1637     WBC 9 03 Thousand/uL      RBC 5 44 Million/uL      Hemoglobin 17 4 g/dL      Hematocrit 48 6 %      MCV 89 fL      MCH 32 0 pg      MCHC 35 8 g/dL      RDW 12 6 %      MPV 8 3 fL      Platelets 295 Thousands/uL      nRBC 0 /100 WBCs      Neutrophils Relative 76 %      Immat GRANS % 1 %      Lymphocytes Relative 12 %      Monocytes Relative 11 %      Eosinophils Relative 0 %      Basophils Relative 0 %      Neutrophils Absolute 6 89 Thousands/µL      Immature Grans Absolute 0 05 Thousand/uL      Lymphocytes Absolute 1 07 Thousands/µL      Monocytes Absolute 0 99 Thousand/µL      Eosinophils Absolute 0 00 Thousand/µL      Basophils Absolute 0 03 Thousands/µL                  No orders to display              Procedures  ECG 12 Lead Documentation Only    Date/Time: 1/19/2021 4:36 PM  Performed by: Asher Agudelo MD  Authorized by: Asher Agudelo MD     ECG reviewed by me, the ED Provider: yes    Patient location:  ED  Previous ECG:     Previous ECG:  Compared to current    Comparison ECG info:  September 21, 2020  Rate:     ECG rate:  100    ECG rate assessment: tachycardic    Rhythm:     Rhythm: sinus tachycardia    Ectopy:     Ectopy: none    QRS:     QRS axis:  Normal    QRS intervals:  Normal  Conduction:     Conduction: normal    ST segments:     ST segments:  Normal  T waves:     T waves: normal    Comments:      P-waves no peaked  Morphology otherwise similar to prior             ED Course  ED Course as of Jan 20 0111   Tue Jan 19, 2021   1734 Alcohol level remains elevated  [place] that parents had been in the waiting room and indicated that they did not wish for him to be discharged with concern for his safety  They are aware that crisis cannot speak with him until his alcohol level lowers and did provide their phone numbers  1812 Patient is hyponatremic and hypochloremic  He persists with tachycardia and admits to not having consumed any fluids other than year over the last few days nor any food  He does have a full meal here which she is interested in eating  I discussed role for medical admission to which she is agreeable  He notes that he did feel "and attack" a little bit ago in which his anxiety increased  Additional lorazepam will be given                                                MDM    Disposition  Final diagnoses: Hyponatremia   Tachycardia   Alcohol intoxication ([facility] [address] )   Alcoholism ([facility] )   Depression with suicidal ideation     Time reflects when diagnosis was documented in both MDM as applicable and the Disposition within this note     Time User Action Codes Description Comment    1/19/2021  6:45 PM Solmon Graft A Add [E87 1] Hyponatremia     1/19/2021  6:45 PM Solmon Graft A Add [R00 0] Tachycardia     1/19/2021  6:45 PM Solmon Graft A Add [F10 929] Alcohol intoxication ([facility] )     1/19/2021  6:45 PM Solmon Graft A Add [F10 20] Alcoholism ([facility] )     1/19/2021  6:45 PM Solmon Graft Add [F32 9,  R45 851] Depression with suicidal ideation       ED Disposition     ED Disposition Condition Date/Time Comment    Admit Stable Tue Jan 19, 2021  6:40 PM Case was discussed with Dr Sofya Ferris and the patient's admission status was agreed to be Admission Status: observation status to the service of Dr Leeanne Brittle   Follow-up Information    None         Patient's Medications   Discharge Prescriptions    No medications on file     No discharge procedures on file      PDMP Review     None          ED Provider  Electronically Signed by           Gricel Garrison MD  01/20/21 0111

## 2021-01-20 LAB
ANION GAP SERPL CALCULATED.3IONS-SCNC: 9 MMOL/L (ref 4–13)
APTT PPP: 26 SECONDS (ref 23–37)
ATRIAL RATE: 100 BPM
BUN SERPL-MCNC: 7 MG/DL (ref 5–25)
CALCIUM SERPL-MCNC: 9.2 MG/DL (ref 8.3–10.1)
CHLORIDE SERPL-SCNC: 101 MMOL/L (ref 100–108)
CO2 SERPL-SCNC: 27 MMOL/L (ref 21–32)
CREAT SERPL-MCNC: 0.8 MG/DL (ref 0.6–1.3)
ERYTHROCYTE [DISTWIDTH] IN BLOOD BY AUTOMATED COUNT: 12.8 % (ref 11.6–15.1)
GFR SERPL CREATININE-BSD FRML MDRD: 104 ML/MIN/1.73SQ M
GLUCOSE SERPL-MCNC: 115 MG/DL (ref 65–140)
HCT VFR BLD AUTO: 45.9 % (ref 36.5–49.3)
HGB BLD-MCNC: 16.2 G/DL (ref 12–17)
MCH RBC QN AUTO: 32.1 PG (ref 26.8–34.3)
MCHC RBC AUTO-ENTMCNC: 35.3 G/DL (ref 31.4–37.4)
MCV RBC AUTO: 91 FL (ref 82–98)
P AXIS: 60 DEGREES
PLATELET # BLD AUTO: 196 THOUSANDS/UL (ref 149–390)
PMV BLD AUTO: 8.4 FL (ref 8.9–12.7)
POTASSIUM SERPL-SCNC: 3.9 MMOL/L (ref 3.5–5.3)
PR INTERVAL: 146 MS
PROTHROMBIN TIME: 12.8 SECONDS (ref 11.6–14.5)
QRS AXIS: 69 DEGREES
QRSD INTERVAL: 76 MS
QT INTERVAL: 322 MS
QTC INTERVAL: 415 MS
RBC # BLD AUTO: 5.04 MILLION/UL (ref 3.88–5.62)
SODIUM SERPL-SCNC: 137 MMOL/L (ref 136–145)
T WAVE AXIS: 36 DEGREES
THROMBIN TIME: 15 SECONDS (ref 14.7–18.4)
THROMBIN TIME: 15.5 SECONDS (ref 14.7–18.4)
VENTRICULAR RATE: 100 BPM
WBC # BLD AUTO: 6.24 THOUSAND/UL (ref 4.31–10.16)

## 2021-01-20 PROCEDURE — 85027 COMPLETE CBC AUTOMATED: CPT | Performed by: PSYCHIATRY & NEUROLOGY

## 2021-01-20 PROCEDURE — 80048 BASIC METABOLIC PNL TOTAL CA: CPT | Performed by: PSYCHIATRY & NEUROLOGY

## 2021-01-20 PROCEDURE — 99225 PR SBSQ OBSERVATION CARE/DAY 25 MINUTES: CPT | Performed by: INTERNAL MEDICINE

## 2021-01-20 PROCEDURE — 99253 IP/OBS CNSLTJ NEW/EST LOW 45: CPT | Performed by: PSYCHIATRY & NEUROLOGY

## 2021-01-20 PROCEDURE — 93010 ELECTROCARDIOGRAM REPORT: CPT | Performed by: INTERNAL MEDICINE

## 2021-01-20 RX ORDER — CHLORDIAZEPOXIDE HYDROCHLORIDE 5 MG/1
5 CAPSULE, GELATIN COATED ORAL EVERY 6 HOURS PRN
Status: DISCONTINUED | OUTPATIENT
Start: 2021-01-20 | End: 2021-01-22 | Stop reason: HOSPADM

## 2021-01-20 RX ORDER — LANOLIN ALCOHOL/MO/W.PET/CERES
9 CREAM (GRAM) TOPICAL
Status: DISCONTINUED | OUTPATIENT
Start: 2021-01-20 | End: 2021-01-22 | Stop reason: HOSPADM

## 2021-01-20 RX ORDER — SODIUM CHLORIDE 9 MG/ML
75 INJECTION, SOLUTION INTRAVENOUS CONTINUOUS
Status: DISCONTINUED | OUTPATIENT
Start: 2021-01-20 | End: 2021-01-20

## 2021-01-20 RX ORDER — FLUOXETINE HYDROCHLORIDE 20 MG/1
40 CAPSULE ORAL DAILY
Status: DISCONTINUED | OUTPATIENT
Start: 2021-01-21 | End: 2021-01-22 | Stop reason: HOSPADM

## 2021-01-20 RX ADMIN — FLUOXETINE 40 MG: 20 CAPSULE ORAL at 10:33

## 2021-01-20 RX ADMIN — MELATONIN 9 MG: at 22:29

## 2021-01-20 RX ADMIN — SODIUM CHLORIDE 75 ML/HR: 0.9 INJECTION, SOLUTION INTRAVENOUS at 00:08

## 2021-01-20 RX ADMIN — Medication 1 TABLET: at 10:34

## 2021-01-20 RX ADMIN — CHLORDIAZEPOXIDE HYDROCHLORIDE 5 MG: 5 CAPSULE ORAL at 17:27

## 2021-01-20 RX ADMIN — FOLIC ACID 1 MG: 1 TABLET ORAL at 10:34

## 2021-01-20 RX ADMIN — PROPRANOLOL HYDROCHLORIDE 10 MG: 20 TABLET ORAL at 17:27

## 2021-01-20 RX ADMIN — THIAMINE HCL TAB 100 MG 100 MG: 100 TAB at 10:33

## 2021-01-20 RX ADMIN — PROPRANOLOL HYDROCHLORIDE 10 MG: 20 TABLET ORAL at 10:34

## 2021-01-20 NOTE — ED NOTES
Pt pacing [facility], stating he is very anxious, requesting his Propanolol d/t taking at home for anxiety, informed pt on the CIWA protocol and will administer ativan d/t increased agitation/restlessness  Pt in no other signs of distress at this time        Edwardo Diamond, BEAR  01/20/21 6845

## 2021-01-20 NOTE — UTILIZATION REVIEW
Initial Clinical Review  OBSERVATION ADMISSION 1/19/2021 CONVERTED TO INPATIENT ADMISSION 1/20/2021 1706 DUE TO ALCOHOL WITHDRAWAL NEEDING MEDICATION SUPPORT WITH 1: 1 OBSERVATION FOR SUICIDAL IDEATIONS MELONIEKAITLIN PSYCHISAMAR CHANDRA  Admission: Date/Time/Statement:     01/20/21 1707  Inpatient Admission Once     Question Answer Comment   Level of Care Med Surg    Estimated length of stay More than 2 Midnights    Certification I certify that inpatient services are medically necessary for this patient for a duration of greater than two midnights  See H&P and MD Progress Notes for additional information about the patient's course of treatment  01/20/21 1706       ED Arrival Information     Expected Arrival Acuity Means of Arrival Escorted By Service Admission Type    - 1/19/2021 13:47 Emergent Walk-In Family Member Hospitalist Emergency    Arrival Complaint    ALCOHOLIC INTOXICATION/SUICIDAL        Chief Complaint   Patient presents with    Suicidal     States "my wife cheated on me so I got really drunk " Admits to drinking daily  Binge drinking since saturday  Unknown of last drink  Pt c/o SI but denies having a plan stating "no they took my pistol "      Assessment/Plan: 47 yo male to ED from home presents with acute alcohol intoxication  Reports on Friday he viewed Facebook & saw his wife wth the man she cheated on him with  Wife had moved out of home in [address] had remained sexually involved with patient  Patient reports binged drinking since FRI had 48 beers in 2-3 days without food or water  On ED arrival stated feeling like he wished to end his life but currently denies this feeling  Did reveal on 30 different occasions he held postol on lap with thoughts of ending hsi life but never attempted suicide   Patient w 3 ED visits with alcohol intoxication w tremors since 9/2020, once including a Intensive OP referral  Patient has never hallucinated or seizured from withdrawal  Admit Observation due to Alcohol intoxication, Suicidal ideation, chronic diarrhea, hyponatremia, hypochloremia, anxiety  IN ED: exam with hand tremor; Benzodiazepine, IVF  CIWA, thiamine & folate, Psych consult, trend BMP  Cont home meds Prozac, 1:1 continuous Observation  Loperamide & cont gentle hydration for 12HRS  1/19/2021 Attending  Pulse is regular and in 80s  He is not tremulous  He is AAOx3 and not hallucinating  He appears calm and is just to start eating his dinner   Assessment and Plan:  · ETOH intoxication, SI with concern for withdrawal    · Admit and restart home PRN BB  · Ativan as needed on CIWA protocol as he already received ativan in ED  · Could consider toxicology consult tomorrow if more clear signs of withdrawal  1/20/2021 attending   Subsequently had tachycardia and hypertension with concern for alcohol withdrawal   He also had suicidal ideation upon admission and await Psychiatry evaluation  Will continue on CIWA protocol with thiamine, folate  Continue IV fluids as patient has ongoing tachycardia  Start low-dose Librium  Patient not interested in inpatient rehabilitation   Awaiting Psyche eval    ED Triage Vitals   Temperature Pulse Respirations Blood Pressure SpO2   01/19/21 1400 01/19/21 1400 01/19/21 1400 01/19/21 1400 01/19/21 1400   98 9 °F (37 2 °C) (!) 124 18 147/99 100 %      Temp Source Heart Rate Source Patient Position - Orthostatic VS BP Location FiO2 (%)   01/19/21 1400 01/19/21 1400 01/19/21 1400 01/19/21 1400 --   Temporal Monitor Sitting Left arm       Pain Score       01/19/21 2032       No Pain          Wt Readings from Last 1 Encounters:   01/19/21 64 kg (141 lb 1 5 oz)     Additional Vital Signs: & CIWA SCORES  CIWA-Ar Score    Row Name  01/20/21   07:13:48  01/20/21   0400  01/20/21   0347  01/20/21   0337  01/20/21   0100   CIWA-Ar   BP  149/105Abnormal   124/95  135/90    141/92   Pulse  98  97  117Abnormal     86   Nausea and Vomiting    0    0  0   Tactile Disturbances    0    0  0   Tremor   1    1  0   Auditory Disturbances    0    0  0   Paroxysmal Sweats    0    0  0   Visual Disturbances    0    0  0   Anxiety    0    1  2   Headache, Fullness in Head    0    0  0   Agitation    0    0  1   Orientation and Clouding of Sensorium    0    0  0   CIWA-Ar Total    1    2  3   Row Name  01/20/21   0000  01/19/21   2351  01/19/21   2300  01/19/21   2227  01/19/21   2032   CIWA-Ar   BP  142/96        133/88   Pulse  101      105  111Abnormal    Nausea and Vomiting    1  1       Tactile Disturbances    0  0       Tremor    1  1       Auditory Disturbances    0  0       Paroxysmal Sweats    1  1       Visual Disturbances    0  0       Anxiety    4  4       Headache, Fullness in Head    0  0       Agitation    4  4       Orientation and Clouding of Sensorium    0  0       CIWA-Ar Total    11  11       Row Name  01/19/21   2030  01/19/21   1824  01/19/21   1649  01/19/21   1634  01/19/21   1400   CIWA-Ar   BP  133/88  135/93  121/82  121/82  147/99   Pulse  111Abnormal   92  122Abnormal   122Abnormal   124Abnormal    Nausea and Vomiting  0           Tactile Disturbances  0           Tremor  0           Auditory Disturbances  0           Paroxysmal Sweats  0           Visual Disturbances  0           Anxiety  0           Headache, Fullness in Head  0           Agitation  0           Orientation and Clouding of Sensorium  0           CIWA-Ar Total  0           PM update  Date/Time  Temp  Pulse  Resp  BP  MAP (mmHg)  SpO2  O2 Device  Patient Position - Orthostatic VS   01/20/21 12:27:37    82    144/105Abnormal   118  97 %             Date/Time  Temp  Pulse  Resp  BP  MAP (mmHg)  SpO2  O2 Device  Patient Position - Orthostatic VS   01/20/21 07:13:48  98 7 °F (37 1 °C)  98  14  149/105Abnormal   120  94 %       01/20/21 0400    97    124/95           01/20/21 0347    117Abnormal   18  135/90    100 % None (Room air)  Lying   01/20/21 0100    86  16  141/92    95 %  None (Room air)  Lying   01/20/21 0000    101  19  142/96    97 %  None (Room air)  Sitting   01/19/21 2227    105        95 %  None (Room air)     01/19/21 2032  98 4 °F (36 9 °C)  111Abnormal   20  133/88  105  94 %  None (Room air)  Sitting   01/19/21 2030    111Abnormal     133/88           01/19/21 1824  98 6 °F (37 °C)  92  18  135/93  110  94 %  None (Room air)  Sitting   01/19/21 1649    122Abnormal   18  121/82    94 %  None (Room air)  Lying   01/19/21 1634    122Abnormal     121/82           01/19/21 1400  98 9 °F (37 2 °C)  124Abnormal   18  147/99    100 %  None (Room air)  Sitting      Weights (last 14 days)    Date/Time  Weight  Weight Method   01/19/21 2032  64 kg (141 lb 1 5 oz)  Estimated   01/19/21 1824  64 kg (141 lb 1 5 oz)  Estimated       Pertinent Labs/Diagnostic Test Results:   Results from last 7 days   Lab Units 01/19/21  1629   SARS-COV-2  Negative     Results from last 7 days   Lab Units 01/20/21  0810 01/19/21 2019 01/19/21  1629   WBC Thousand/uL 6 24  --  9 03   HEMOGLOBIN g/dL 16 2  --  17 4*   HEMATOCRIT % 45 9  --  48 6   PLATELETS Thousands/uL 196 238 272   NEUTROS ABS Thousands/µL  --   --  6 89         Results from last 7 days   Lab Units 01/20/21  0811 01/19/21 2019 01/19/21  1629   SODIUM mmol/L 137 136 130*   POTASSIUM mmol/L 3 9 4 2 4 4   CHLORIDE mmol/L 101 99* 93*   CO2 mmol/L 27 26 25   ANION GAP mmol/L 9 11 12   BUN mg/dL 7 6 4*   CREATININE mg/dL 0 80 0 76 0 75   EGFR ml/min/1 73sq m 104 106 107   CALCIUM mg/dL 9 2 8 7 9 0   MAGNESIUM mg/dL  --  1 7  --      Results from last 7 days   Lab Units 01/19/21 2019 01/19/21  1629   AST U/L 44 51*   ALT U/L 54 58   ALK PHOS U/L 59 65   TOTAL PROTEIN g/dL 6 9 7 8   ALBUMIN g/dL 3 6 4 1   TOTAL BILIRUBIN mg/dL 0 59 0 43         Results from last 7 days   Lab Units 01/20/21  0811 01/19/21  2019 01/19/21  1629   GLUCOSE RANDOM mg/dL 115 116 123           Results from last 7 days   Lab Units 01/19/21  1629   TROPONIN I ng/mL <0 02         Results from last 7 days   Lab Units 01/19/21  2019 01/19/21  1629   PROTIME seconds 12 8  12 8 12 7   INR  0 95 0 94   PTT seconds 26  27 27     Results from last 7 days   Lab Units 01/19/21  1629   TSH 3RD GENERATON uIU/mL 1 595           Results from last 7 days   Lab Units 01/19/21  1629   INFLUENZA A PCR  Negative   INFLUENZA B PCR  Negative   RSV PCR  Negative         Results from last 7 days   Lab Units 01/19/21  1559   AMPH/METH  Negative   BARBITURATE UR  Negative   BENZODIAZEPINE UR  Negative   COCAINE UR  Negative   METHADONE URINE  Negative   OPIATE UR  Negative   PCP UR  Negative   THC UR  Negative     1/19 ekg sinus tachycardia    ED Treatment:   Medication Administration from 01/19/2021 1346 to 01/20/2021 0402       Date/Time Order Dose Route Action     01/19/2021 1645 sodium chloride 0 9 % bolus 1,000 mL 1,000 mL Intravenous New Bag     01/19/2021 1634 LORazepam (ATIVAN) injection 1 mg 1 mg Intravenous Given     01/19/2021 1634 propranolol (INDERAL) tablet 10 mg 10 mg Oral Given     01/19/2021 1634 FLUoxetine (PROzac) capsule 40 mg 40 mg Oral Given     01/19/2021 1922 LORazepam (ATIVAN) injection 1 mg 1 mg Intravenous Given     01/20/2021 0008 sodium chloride 0 9 % infusion 75 mL/hr Intravenous New Bag     01/19/2021 2356 LORazepam (ATIVAN) tablet 2 mg 2 mg Oral Given        Past Medical History:   Diagnosis Date    Alcoholic ([place] [address] )     Allergic rhinitis      Present on Admission:   Anxiety      Admitting Diagnosis: Alcohol intoxication ([facility] ) [F10 929]  Alcoholism ([facility] ) [F10 20]  Hyponatremia [E87 1]  Suicidal behavior [R45 89]  Tachycardia [R00 0]  Depression with suicidal ideation [F32 9, R45 851]  Age/Sex: 48 y o  male  Admission Orders:  CONTINUAL 1:1 OBSERVATION  CIWA SCORES  AMBULATE PATIENT  CONT PULSE OXIMETRY  Scheduled Medications:  FLUoxetine, 40 mg, Oral, Daily  folic acid, 1 mg, Oral, Daily  folic acid 1 mg, thiamine 100 mg in 0 9% sodium chloride 100 mL IVPB, , Intravenous, Daily  multivitamin-minerals, 1 tablet, Oral, Daily  propranolol, 10 mg, Oral, BID  thiamine, 100 mg, Oral, Daily    Continuous IV Infusions:     PRN Meds:  loperamide, 2 mg, Oral, TID PRN    IP CONSULT TO PSYCHIATRY    Network Utilization Review Department  ATTENTION: Please call with any questions or concerns to [phone] and carefully listen to the prompts so that you are directed to the right person  All voicemails are confidential   Suni Cheng all requests for admission clinical reviews, approved or denied determinations and any other requests to dedicated fax number below belonging to the campus where the patient is receiving treatment   List of dedicated fax numbers for the Facilities:  [address] DENIALS (Administrative/Medical Necessity) [phone]   [address] (Maternity/NICU/Pediatrics) [phone] [address] [place] [address] Dr Anahi Malhotra 3250 (Candy Curtis "Sharonda" 103) [address] [address] Anibal Haji 1481 [address] [place]) [address] [place] [address] 74 [phone]

## 2021-01-20 NOTE — ASSESSMENT & PLAN NOTE
51-year-old with past medical history of alcohol use disorder, anxiety who has a long history of alcohol use   Rehab several times, most recently on 10/24 pt was in [facility]  This was 6 week program and was done virtually, pt continued to drink through this program   O   Pt ingested about 48 beers in the last 48-72 hrs, he states he has not eaten anything or had any other fluids to drink in the same time period     EtOH 0 167 on admission, down to 0 067 this morning   Subjective symptoms of w/d mild, objective mild, but did receive 4 mg ativan overnight   Some pretty significant mydriasis on exam this morning but no observed tremulousness   In past required toxicology consult and phenobarbital tx required but currently responding well to ativan    Plan:  · Thiamine and folate  · Psychiatry consulted, appreciate recs  · Trend BMP while IP  · Increased Inderal 2/2 HTN this afternoon  · Medically cleared for d/c to IP BHU w/ medications  · May not leave AMA - will need to be 302'd if tries to leave

## 2021-01-20 NOTE — ASSESSMENT & PLAN NOTE
Pt is in sodium on admission is 130   Recovered to 137 w/in 24 hrs    Plan  · Was likely due to decreased oral intake  · Continue NS @ 75 cc/hr for now  · Continue to monitor  · Trend BMP  · Do NOT over-correct (slow correction rate to < 6-8 / 24 hrs)

## 2021-01-20 NOTE — ASSESSMENT & PLAN NOTE
Pt made statements in the ED about not wanting to live and feeling like he had nothing to live for anymore   Wife cheating --> increased his drinking   Mentions at least 30 occasions holding a pistol in his hands and contemplating suicide, though he has never followed through with it (always disassembled the gun       Pt states he is able to stay safe in the ED and no longer is having suicidal thoughts   However, per psychiatry discussion with family, he is not safe to go home and will need further psychiatric management   Cleared from medical standpoint to go to [facility]  · Continue home medicine Prozac  · Psychiatry consulted, appreciate recs  · Pt continues to be danger to himself  · Has signed 201  · F/u IP BHU placement  · Continue 1:1 continuous observation

## 2021-01-20 NOTE — ASSESSMENT & PLAN NOTE
Pt is in sodium on admission is 130    Plan  · Was likely due to decreased oral intake  · Start 75 cc normal saline for 12 hours

## 2021-01-20 NOTE — PROGRESS NOTES
Progress Note Katie Simpler 1970, 48 y o  male MRN: [id number]  Unit/Bed#: W -71 Encounter: [id number]  Primary Care Provider: Shane Wilburn MD   Date and time admitted to hospital: 1/19/2021  2:12 PM    * Alcohol intoxication with moderate or severe use disorder [facility])  Assessment & Plan  51-year-old with past medical history of alcohol use disorder, anxiety who has a long history of alcohol use   Rehab several times, most recently on 10/24 pt was in [facility]  This was 6 week program and was done virtually, pt continued to drink through this program   O   Pt ingested about 48 beers in the last 48-72 hrs, he states he has not eaten anything or had any other fluids to drink in the same time period   EtOH 0 167 on admission, down to 0 067 this morning   Subjective symptoms of w/d mild, objective mild, but did receive 4 mg ativan overnight   Some pretty significant mydriasis on exam this morning but no observed tremulousness   In past required toxicology consult and phenobarbital tx required but currently responding well to ativan    Plan:  · Continue CIWA protocol  · thiamine and folate  · Toxicology consult can be held off for now  · Consider standing librium or serax  · Psychiatry consulted, appreciate recs  · Trend BMP    Hyponatremia  Assessment & Plan  Pt is in sodium on admission is 130   Recovered to 137 w/in 24 hrs    Plan  · Was likely due to decreased oral intake  · Continue NS @ 75 cc/hr for now  · Continue to monitor  · Trend BMP  · Do NOT over-correct (slow correction rate to < 6-8 / 24 hrs)    Chronic diarrhea of unknown origin  Assessment & Plan  Pt states he has had chronic diarrhea but it has never been worked up  He has never spoken with his PCP about his diarrhea  Pt states he cannot remember the last time he has had solid stools and coffee worsens his diarrhea  Plan  · Loperamide p r n      Suicidal ideation  Assessment & Plan  Pt made statements in the ED about not wanting to live and feeling like he had nothing to live for anymore  Pt states that he found out his wife was cheating on him back in October and since then he has increased his drinking  Pt mentions on 30 occasions holding a pistol in his hands and contemplating suicide, he has never followed through with it and has always disassembled the gun  Pt states he is able to stay safe in the ED and no longer is having suicidal thoughts    Plan  · Continue home medicine Prozac  · Psychiatry consult  · 1:1 continuous observation    Hypochloremia  Assessment & Plan  Patient's chloride is 93    Plan  · Most likely due to decreased oral intake  · Continue NS @ 75 cc/hr for now  · Encourage p o  Intake  · Daily BMP while admitted    [address] [facility] home medication Prozac 40 mg daily and Inderal 10 mg b i d  Plan:   Roverto Orellana Psychiatry consulted, appreciate recs   Continue home meds   Continue to monitor    VTE Pharmacologic Prophylaxis:   Pharmacologic: VTE low  Mechanical VTE Prophylaxis in Place: Yes  Discussions with Specialists or Other Care Team Provider: CM, psychiatry, nursing  Education and Discussions with Family / Patient: Patient and family updated daily and expressed understanding of and agreement with the management plan  Current Length of Stay: 0 day(s)  Current Patient Status: Observation   Discharge Plan / Estimated Discharge Date: TBD   Code Status: Level 1 - Full Code    Subjective:   Patient seen and examined  No current complaints except for mild disorientation this morning  Suspect 2/2 BDZ administration  AOx3  Explained in depth why administered BDZ and the possibility of mild fogginess as a side effect  Patient expressed understanding and agreement with continuing this plan  No current SI  Denies CP, SOB, N/V, bladder disturbance  Subjective fevers and chills this morning, and chronic diarrhea  No additional acute concerns  Tolerating crackers       Objective:   Vitals:   Temp (24hrs), Av 7 °F (37 1 °C), Min:98 4 °F (36 9 °C), Max:98 9 °F (37 2 °C)    Temp:  [98 4 °F (36 9 °C)-98 9 °F (37 2 °C)] 98 7 °F (37 1 °C)  HR:  [] 98  Resp:  [14-20] 14  BP: (121-149)/() 149/105  SpO2:  [94 %-100 %] 94 %  Body mass index is 22 77 kg/m²  Input and Output Summary (last 24 hours):   No intake or output data in the 24 hours ending 21 1149    Physical Exam:   Physical Exam  Vitals signs reviewed  Constitutional:       General: He is not in acute distress  Appearance: Normal appearance  He is well-developed and normal weight  He is not ill-appearing, toxic-appearing or diaphoretic  HENT:      Head: Normocephalic and atraumatic  Right Ear: External ear normal       Left Ear: External ear normal       Nose: Nose normal  No congestion or rhinorrhea  Mouth/Throat:      Mouth: Mucous membranes are moist       Pharynx: Oropharynx is clear  No oropharyngeal exudate or posterior oropharyngeal erythema  Eyes:      General: No scleral icterus (mild scleral discoloration, may be physiologic)  Right eye: No discharge  Left eye: No discharge  Extraocular Movements: Extraocular movements intact  Conjunctiva/sclera: Conjunctivae normal       Pupils: Pupils are equal, round, and reactive to light  Pupils are equal       Right eye: Pupil is reactive (dilated but briskly reactive) and not sluggish  Left eye: Pupil is reactive (dilated but briskly reactive) and not sluggish  Cardiovascular:      Rate and Rhythm: Regular rhythm  Tachycardia present  Heart sounds: Normal heart sounds  No murmur  No friction rub  No gallop  Pulmonary:      Effort: Pulmonary effort is normal  No respiratory distress  Breath sounds: Normal breath sounds  No stridor  No wheezing, rhonchi or rales  Abdominal:      General: Abdomen is flat  Bowel sounds are normal  There is no distension  Palpations: Abdomen is soft  Tenderness:  There is no abdominal tenderness  There is no guarding or rebound  Musculoskeletal:         General: No swelling or deformity  Right lower leg: No edema  Left lower leg: No edema  Skin:     General: Skin is warm and dry  Coloration: Skin is not jaundiced or pale  Neurological:      General: No focal deficit present  Mental Status: He is alert and oriented to person, place, and time  Motor: No weakness or abnormal muscle tone  Psychiatric:         Mood and Affect: Mood normal          Behavior: Behavior normal      Additional Data:   Labs:  Results from last 7 days   Lab Units 01/20/21  0810  01/19/21  1629   WBC Thousand/uL 6 24  --  9 03   HEMOGLOBIN g/dL 16 2  --  17 4*   HEMATOCRIT % 45 9  --  48 6   PLATELETS Thousands/uL 196   < > 272   NEUTROS PCT %  --   --  76*   LYMPHS PCT %  --   --  12*   MONOS PCT %  --   --  11   EOS PCT %  --   --  0    < > = values in this interval not displayed  Results from last 7 days   Lab Units 01/20/21  0811 01/19/21 2019   POTASSIUM mmol/L 3 9 4 2   CHLORIDE mmol/L 101 99*   CO2 mmol/L 27 26   BUN mg/dL 7 6   CREATININE mg/dL 0 80 0 76   CALCIUM mg/dL 9 2 8 7   ALK PHOS U/L  --  59   ALT U/L  --  54   AST U/L  --  44     Results from last 7 days   Lab Units 01/19/21  2019   INR  0 95     * I Have Reviewed All Lab Data Listed Above  * Additional Pertinent Lab Tests Reviewed:  R[facility] Admission Reviewed    Imaging:  Imaging Reports Reviewed Today Include: N/A  Imaging Personally Reviewed by Myself Includes:  N/A    Recent Cultures (last 7 days):       Last 24 Hours Medication List:   Current Facility-Administered Medications   Medication Dose Route Frequency Provider Last Rate    FLUoxetine  40 mg Oral Daily Jun Magana MD      folic acid  1 mg Oral Daily Ana Luisa Moreau MD      folic acid 1 mg, thiamine 100 mg in 0 9% sodium chloride 100 mL IVPB   Intravenous Daily Joslyn Sandy DO      loperamide  2 mg Oral TID PRN Marshia Leopolis, DO      multivitamin-minerals  1 tablet Oral Daily Mason Webb MD      propranolol  10 mg Oral BID Marshia Leopolis, DO      sodium chloride  75 mL/hr Intravenous Continuous Karlie Wiseman MD      thiamine  100 mg Oral Daily Mason Webb MD        Today, Patient Was Seen By: Karlie Wiseman MD    ** Please Note: This note has been constructed using a voice recognition system   **

## 2021-01-20 NOTE — ASSESSMENT & PLAN NOTE
Continue home medication Prozac 40 mg daily and Inderal 10 mg b i d      Plan:   Lissette Augustine Psychiatry consulted, appreciate recs   Continue home meds   Continue to monitor

## 2021-01-20 NOTE — ASSESSMENT & PLAN NOTE
Pt states he has had chronic diarrhea but it has never been worked up  He has never spoken with his PCP about his diarrhea  Pt states he cannot remember the last time he has had solid stools and coffee worsens his diarrhea  Plan  · Loperamide p r n

## 2021-01-20 NOTE — PLAN OF CARE
Problem: PAIN - ADULT  Goal: Verbalizes/displays adequate comfort level or baseline comfort level  Description: Interventions:  - Encourage patient to monitor pain and request assistance  - Assess pain using appropriate pain scale  - Administer analgesics based on type and severity of pain and evaluate response  - Implement non-pharmacological measures as appropriate and evaluate response  - Consider cultural and social influences on pain and pain management  - Notify physician/advanced practitioner if interventions unsuccessful or patient reports new pain  Outcome: Progressing     Problem: INFECTION - ADULT  Goal: Absence or prevention of progression during hospitalization  Description: INTERVENTIONS:  - Assess and monitor for signs and symptoms of infection  - Monitor lab/diagnostic results  - Monitor all insertion sites, i e  indwelling lines, tubes, and drains  - Monitor endotracheal if appropriate and nasal secretions for changes in amount and color  - [place] appropriate cooling/warming therapies per order  - Administer medications as ordered  - Instruct and encourage patient and family to use good hand hygiene technique  - Identify and instruct in appropriate isolation precautions for identified infection/condition  Outcome: Progressing     Problem: SAFETY ADULT  Goal: Patient will remain free of falls  Description: INTERVENTIONS:  - Assess patient frequently for physical needs  -  Identify cognitive and physical deficits and behaviors that affect risk of falls    -  [place] fall precautions as indicated by assessment   - Educate patient/family on patient safety including physical limitations  - Instruct patient to call for assistance with activity based on assessment  - Modify environment to reduce risk of injury  - Consider OT/PT consult to assist with strengthening/mobility  Outcome: Progressing  Goal: Maintain or return to baseline ADL function  Description: INTERVENTIONS:  -  Assess patient's ability to carry out ADLs; assess patient's baseline for ADL function and identify physical deficits which impact ability to perform ADLs (bathing, care of mouth/teeth, toileting, grooming, dressing, etc )  - Assess/evaluate cause of self-care deficits   - Assess range of motion  - Assess patient's mobility; develop plan if impaired  - Assess patient's need for assistive devices and provide as appropriate  - Encourage maximum independence but intervene and supervise when necessary  - Involve family in performance of ADLs  - Assess for home care needs following discharge   - Consider OT consult to assist with ADL evaluation and planning for discharge  - Provide patient education as appropriate  Outcome: Progressing  Goal: Maintain or return mobility status to optimal level  Description: INTERVENTIONS:  - Assess patient's baseline mobility status (ambulation, transfers, stairs, etc )    - Identify cognitive and physical deficits and behaviors that affect mobility  - Identify mobility aids required to assist with transfers and/or ambulation (gait belt, sit-to-stand, lift, walker, cane, etc )  - [place] fall precautions as indicated by assessment  - Record patient progress and toleration of activity level on Mobility SBAR; progress patient to next Phase/Stage  - Instruct patient to call for assistance with activity based on assessment  - Consider rehabilitation consult to assist with strengthening/weightbearing, etc   Outcome: Progressing     Problem: DISCHARGE PLANNING  Goal: Discharge to home or other facility with appropriate resources  Description: INTERVENTIONS:  - Identify barriers to discharge w/patient and caregiver  - Arrange for needed discharge resources and transportation as appropriate  - Identify discharge learning needs (meds, wound care, etc )  - Arrange for interpretive services to assist at discharge as needed  - Refer to Case Management Department for coordinating discharge planning if the patient needs post-hospital services based on physician/advanced practitioner order or complex needs related to functional status, cognitive ability, or social support system  Outcome: Progressing     Problem: Knowledge Deficit  Goal: Patient/family/caregiver demonstrates understanding of disease process, treatment plan, medications, and discharge instructions  Description: Complete learning assessment and assess knowledge base    Interventions:  - Provide teaching at level of understanding  - Provide teaching via preferred learning methods  Outcome: Progressing

## 2021-01-20 NOTE — H&P
H&P- Daina Ta 1970, 48 y o  male MRN: [id number]    Unit/Bed#: [facility] Encounter: [id number]    Primary Care Provider: Mireille Aguillon MD   Date and time admitted to hospital: 1/19/2021  2:12 PM        * Alcohol intoxication with moderate or severe use disorder [facility])  Assessment & Plan  27-year-old with past medical history of alcohol use disorder, anxiety who has a long history of alcohol use  Pt states he has tried rehab several times, most recently on 10/24 pt was in [facility] intensive outpatient program   This was 6 week program and was done virtually, pt continued to drink through this program   On Friday patient's all face but picture of his wife with you may and that she cheated on him with, this caused him to increase his drinking  Pt ingested about 48 beers in the last 2-3 days, he states he has not eaten anything or had any other fluids to drink in the same time period  Plan:  · CIWA protocol  · thiamine and folate  · Toxicology consult - pt has had a prior admission requiring toxicology consult and IV phenobarbital for alcohol withdrawal  · Psychiatry consult  · Trend BMP    Suicidal ideation  Assessment & Plan  Pt made statements in the ED about not wanting to live and feeling like he had nothing to live for anymore  Pt states that he found out his wife was cheating on him back in October and since then he has increased his drinking  Pt mentions on 30 occasions holding a pistol in his hands and contemplating suicide, he has never followed through with it and has always disassembled the gun  Pt states he is able to stay safe in the ED and no longer is having suicidal thoughts    Plan  · Continue home medicine Prozac  · Psychiatry consult  · 1:1 continuous observation    Chronic diarrhea of unknown origin  Assessment & Plan  Pt states he has had chronic diarrhea but it has never been worked up  He has never spoken with his PCP about his diarrhea    Pt states he cannot remember the last time he has had solid stools and coffee worsens his diarrhea  Plan  · Loperamide p r n  Hyponatremia  Assessment & Plan  Pt is in sodium on admission is 130    Plan  · Was likely due to decreased oral intake  · Start 75 cc normal saline for 12 hours    Hypochloremia  Assessment & Plan  Patient's chloride is 93    Plan  · Most likely due to decreased oral intake  · Start 75 cc normal saline for 12 hours  · Encourage p o  Intake    Anxiety  Assessment & Plan  Continue home medication Prozac 40 mg daily and Inderal 10 mg b i d       VTE Prophylaxis: VTE score of 2  / sequential compression device   Code Status:  Level 1  POLST: POLST form is not discussed and not completed at this time  Anticipated Length of Stay:  Patient will be admitted on an Observation basis with an anticipated length of stay of   2 midnights  Justification for Hospital Stay:  Acute alcohol intoxication    Chief Complaint:   On Friday I saw picture of my wife with the man she cheated with me on Facebook and I started to drink, could not help myself    History of Present Illness:    Crayton Gaucher is a 48 y o  male with past medical history of alcohol use disorder and anxiety who presents with acute alcohol intoxication  Pt states that on Friday he was on Facebook and saw picture of his wife with the man she cheated on him with  Pt states in October he discovered that his wife was cheating on him, she moved out of the house  Pt states him and his wife continued to be sexually involved which is further upsetting to the pt  Pt states on Friday when he saw the picture he began to increase his alcohol drinking  Pt states within 2-3 days he had 48 beers and did not eat or drink anything else  When pt arrived to the ED he did make statements about feeling like he wanted to end his life and that there was nothing else to live for  When this note writer was speaking to the pt he stated that he does not currently feel that way    Does reveal that on 30 different occasions he has held his pistol on his lap with thoughts of wanting to end his life, but has never attempted suicide it  Pt lives alone, his wife moved out around October and took their 6year-old son with her  He has not heard from his wife or son in 2 weeks  Pt does contract for safety while in the ED  Pt states he came to the ED because he was scared of DTs, which he describes as tremulous hands  Pt has never hallucinated or had a seizure while withdrawing from alcohol  The longest period of sobriety for this pt recently was around 28 days  Pt also reports anxiety for which she is treated by his PCP, pt reports periods of chest tightness and palpitations  Currently pt denies shortness of breath and chest pain  In the ED troponins were negative, UDS was negative, COVID was negative, TSH within normal limits  10/24/2020 pt was in the ED after consuming 40 shots, he was set up with post resources and was sent to [facility] intensive outpatient program     09/21/2020 pt came to the ED with withdrawal tremors, toxicology was consulted and pt was given IV phenobarbital    01/31/2010 pt presented to the ED with tremors most likely related to alcohol use    Review of Systems:    Review of Systems   Constitutional: Positive for appetite change  Respiratory: Positive for chest tightness  Cardiovascular: Positive for palpitations  Gastrointestinal: Positive for diarrhea  Neurological: Positive for tremors  Negative for seizures  Psychiatric/Behavioral: Positive for suicidal ideas  Negative for confusion and hallucinations  The patient is nervous/anxious  Past Medical and Surgical History:     Past Medical History:   Diagnosis Date    Alcoholic ([facility] [address] )     Allergic rhinitis        Past Surgical History:   Procedure Laterality Date    VASECTOMY         Meds/Allergies:    Prior to Admission medications    Medication Sig Start Date End Date Taking?  Authorizing Provider   FLUoxetine (PROzac) 40 MG capsule Take 1 capsule (40 mg total) by mouth daily 12/22/20  Yes Zaire Gaines MD   multivitamin [facility]) TABS Take 1 tablet by mouth daily   Yes Historical Provider, MD   propranolol (INDERAL) 10 mg tablet take 1 tablet by mouth twice a day if needed for anxiety or PALPITATIONS 1/7/21  Yes Ciro St MD   thiamine (VITAMIN B1) 100 mg tablet Take 100 mg by mouth daily   Yes Historical Provider, MD   Fexofenadine HCl (ALLEGRA ALLERGY PO) Take by mouth    Historical Provider, MD   hydrOXYzine HCL (ATARAX) 25 mg tablet Take 1 tablet (25 mg total) by mouth every 6 (six) hours as needed for anxiety  Patient not taking: Reported on 1/19/2021 11/4/20   Zaire Gaines MD   NON FORMULARY Take 2 each by mouth 2 (two) times a day De stress balls-ASHWAGANDHA-ginseng and lemon balm    Historical Provider, MD   tadalafil (CIALIS) 10 MG tablet Take 1 tablet (10 mg total) by mouth daily as needed for erectile dysfunction  Patient not taking: Reported on 12/22/2020 12/1/20   Ciro St MD     I have reviewed home medications with patient personally  Allergies:    Allergies   Allergen Reactions    Other Allergic Rhinitis     seasonal       Social History:     Marital Status: /Civil Union   Occupation:     Patient Pre-hospital Living Situation:  Lives at home alone  Patient Pre-hospital Level of Mobility:  Independent  Patient Pre-hospital Diet Restrictions:  None  Substance Use History:   Social History     Substance and Sexual Activity   Alcohol Use Not Currently    Comment: 4 days sober     Social History     Tobacco Use   Smoking Status Current Some Day Smoker    Types: Cigars   Smokeless Tobacco Never Used   Tobacco Comment    cigars 4-5x/week     Social History     Substance and Sexual Activity   Drug Use No       Family History:    Family History   Problem Relation Age of Onset    No Known Problems Mother     No Known Problems Father        Physical Exam:     Vitals: Blood Pressure: 133/88 (01/19/21 2032)  Pulse: (!) 111 (01/19/21 2032)  Temperature: 98 4 °F (36 9 °C) (01/19/21 2032)  Temp Source: Oral (01/19/21 2032)  Respirations: 20 (01/19/21 2032)  Weight - Scale: 64 kg (141 lb 1 5 oz) (01/19/21 2032)  SpO2: 94 % (01/19/21 2032)    Physical Exam  Vitals signs and nursing note reviewed  Constitutional:       General: He is not in acute distress  Appearance: He is obese  He is diaphoretic  He is not ill-appearing or toxic-appearing  Comments: Pt does appear a bit anxious and fidgety   HENT:      Head: Normocephalic  Right Ear: External ear normal       Left Ear: External ear normal       Nose: Nose normal    Eyes:      General: No scleral icterus  Right eye: No discharge  Left eye: No discharge  Conjunctiva/sclera: Conjunctivae normal    Neck:      Musculoskeletal: Normal range of motion  Cardiovascular:      Rate and Rhythm: Regular rhythm  Tachycardia present  Pulses: Normal pulses  Heart sounds: Normal heart sounds  No murmur  Pulmonary:      Effort: Pulmonary effort is normal  No respiratory distress  Breath sounds: Normal breath sounds  No wheezing  Chest:      Chest wall: No tenderness  Abdominal:      General: Abdomen is flat  Bowel sounds are normal  There is no distension  Palpations: Abdomen is soft  Tenderness: There is no abdominal tenderness  There is no guarding or rebound  Musculoskeletal: Normal range of motion  General: No swelling  Skin:     General: Skin is warm  Findings: No bruising, lesion or rash  Neurological:      Mental Status: He is alert and oriented to person, place, and time  Motor: No weakness  Gait: Gait normal    Psychiatric:      Comments: Pt does denied current SI and contracts for safety while in the hospital           Additional Data:     Lab Results: I have personally reviewed pertinent reports        Results from last 7 days   Lab Units 01/19/21 2019 01/19/21  1629   WBC Thousand/uL  --  9 03   HEMOGLOBIN g/dL  --  17 4*   HEMATOCRIT %  --  48 6   PLATELETS Thousands/uL 238 272   NEUTROS PCT %  --  76*   LYMPHS PCT %  --  12*   MONOS PCT %  --  11   EOS PCT %  --  0     Results from last 7 days   Lab Units 01/19/21 2019   POTASSIUM mmol/L 4 2   CHLORIDE mmol/L 99*   CO2 mmol/L 26   BUN mg/dL 6   CREATININE mg/dL 0 76   CALCIUM mg/dL 8 7   ALK PHOS U/L 59   ALT U/L 54   AST U/L 44     Results from last 7 days   Lab Units 01/19/21  2019   INR  0 95       Imaging: I have personally reviewed pertinent reports  No results found  EKG, Pathology, and Other Studies Reviewed on Admission:   · EKG:  Sinus tachycardia, rate 100    Epic / Care Everywhere Records Reviewed: No     ** Please Note: This note has been constructed using a voice recognition system   **

## 2021-01-20 NOTE — ASSESSMENT & PLAN NOTE
51-year-old with past medical history of alcohol use disorder, anxiety who has a long history of alcohol use  Pt states he has tried rehab several times, most recently on 10/24 pt was in [facility] intensive outpatient program   This was 6 week program and was done virtually, pt continued to drink through this program   On Friday patient's all face but picture of his wife with you may and that she cheated on him with, this caused him to increase his drinking  Pt ingested about 48 beers in the last 2-3 days, he states he has not eaten anything or had any other fluids to drink in the same time period      Plan:  · CIWA protocol  · thiamine and folate  · Toxicology consult - pt has had a prior admission requiring toxicology consult and IV phenobarbital for alcohol withdrawal  · Psychiatry consulted, appreciate recs  · Trend BMP

## 2021-01-20 NOTE — ASSESSMENT & PLAN NOTE
Pt made statements in the ED about not wanting to live and feeling like he had nothing to live for anymore  Pt states that he found out his wife was cheating on him back in October and since then he has increased his drinking  Pt mentions on 30 occasions holding a pistol in his hands and contemplating suicide, he has never followed through with it and has always disassembled the gun    Pt states he is able to stay safe in the ED and no longer is having suicidal thoughts    Plan  · Continue home medicine Prozac  · Psychiatry consult  · 1:1 continuous observation

## 2021-01-20 NOTE — DISCHARGE INSTR - AVS FIRST PAGE
Dear Nadia Saunders,     It was our pleasure to care for you here at [facility]  It is our hope that we were always able to exceed the expected standards for your care during your stay  You were hospitalized due to acute alcohol intoxication in the setting of an alcohol use disorder and a history of complicated withdrawal symptoms  You expressed some concerning negative thoughts about hurting yourself, noted that you had been under considerable interpersonal stress recently and per recommendations from psychiatry were advised to continue treatment at an inpatient behavioral health unit  You were also found incidentally to have mild hyponatremia (low salt level in your blood stream) which we suspect was due to your limited nutritional intake over the few days prior to admission  This corrected adequately with fluids that we administered You were cared for on the [facility] 4th floor by Ne Sandy MD under the service of Neeru Somemr MD with the [facility] who covers for your primary care physician (PCP), Artie Warren MD, while you were hospitalized  If you have any questions or concerns related to this hospitalization, you may contact us at [phone]  For follow up as well as any medication refills, we recommend that you follow up with your primary care physician  A registered nurse will reach out to you by phone within a few days after your discharge to answer any additional questions that you may have after going home  However, at this time we provide for you here, the most important instructions / recommendations at discharge:     · Notable Medication Adjustments -   · Please continue taking all medications as prescribed, including Prozac, folic acid, Inderal, and thiamine   At your next facility, the physicians may make some changes to your psychiatric medications, but do please continue taking your vitamins as advised below  · Folic Acid 1 mg daily  · Thiamine 100 mg daily  · Testing Required after Discharge -   · None  · Important follow up information -   · Please be sure to follow up with you PCP within a week to monitor your progress and go over all of the things we discussed while you were admitted  · Please follow up with any psychiatric specialists as advised by your specialists  · Other Instructions -   · Heavy drinking can often worsen mood disorders like depression and you should consider cutting down if you are able  · Please review this entire after visit summary as additional general instructions including medication list, appointments, activity, diet, any pertinent wound care, and other additional recommendations from your care team that may be provided for you      Sincerely,     Shy Bourgeois MD

## 2021-01-20 NOTE — PROGRESS NOTES
Progress Note Justin Pitch 1970, 48 y o  male MRN: [id number]  Unit/Bed#: W -01 Encounter: [id number]  Primary Care Provider: Bronwyn Denver, MD   Date and time admitted to hospital: 1/19/2021  2:12 PM    Suicidal ideation  Assessment & Plan  Pt made statements in the ED about not wanting to live and feeling like he had nothing to live for anymore   Wife cheating --> increased his drinking   Mentions at least 30 occasions holding a pistol in his hands and contemplating suicide, though he has never followed through with it (always disassembled the gun   Pt states he is able to stay safe in the ED and no longer is having suicidal thoughts   However, per psychiatry discussion with family, he is not safe to go home and will need further psychiatric management   Cleared from medical standpoint to go to [facility]  · Continue home medicine Prozac  · Psychiatry consulted, appreciate recs  · Pt continues to be danger to himself  · Has signed 201  · F/u IP BHU placement  · Continue 1:1 continuous observation    * Alcohol intoxication with moderate or severe use disorder ([facility] [address] )  Assessment & Plan  51-year-old with past medical history of alcohol use disorder, anxiety who has a long history of alcohol use   Rehab several times, most recently on 10/24 pt was in [facility]  This was 6 week program and was done virtually, pt continued to drink through this program   O   Pt ingested about 48 beers in the last 48-72 hrs, he states he has not eaten anything or had any other fluids to drink in the same time period     EtOH 0 167 on admission, down to 0 067 this morning   Subjective symptoms of w/d mild, objective mild, but did receive 4 mg ativan overnight   Some pretty significant mydriasis on exam this morning but no observed tremulousness   In past required toxicology consult and phenobarbital tx required but currently responding well to ativan    Plan:  · Thiamine and folate  · Psychiatry consulted, appreciate recs  · Trend BMP while IP  · Increased Inderal 2/2 HTN this afternoon  · Medically cleared for d/c to IP BHU w/ medications  · May not leave AMA - will need to be 302'd if tries to leave    Hyponatremia  Assessment & Plan  Pt is in sodium on admission is 130   Recovered to 137 w/in 24 hrs   Resolved     Plan  · Was likely due to decreased oral intake  · Continue NS @ 75 cc/hr for now  · Continue to monitor  · Trend BMP  · Do NOT over-correct (slow correction rate to < 6-8 / 24 hrs)    Chronic diarrhea of unknown origin  Assessment & Plan  Pt states he has had chronic diarrhea but it has never been worked up  He has never spoken with his PCP about his diarrhea  Pt states he cannot remember the last time he has had solid stools and coffee worsens his diarrhea  Plan  · Loperamide p r n  Hypochloremia  Assessment & Plan  Patient's chloride is 93    Plan  · Most likely due to decreased oral intake  · Continue NS @ 75 cc/hr for now  · Encourage p o  Intake  · Daily BMP while admitted    [address] [facility] home medication Prozac 40 mg daily and Inderal 10 mg b i d  Plan:   Sandra Garvin Psychiatry consulted, appreciate recs   Continue home meds   Continue to monitor      VTE Pharmacologic Prophylaxis:   Pharmacologic: VTE screen low  Mechanical VTE Prophylaxis in Place: Yes  Discussions with Specialists or Other Care Team Provider: CM, psychiatry, nursing  Education and Discussions with Family / Patient: Patientupdated daily and expressed understanding of and agreement with the management plan  Current Length of Stay: 1 day(s)  Current Patient Status: Inpatient   Discharge Plan / Estimated Discharge Date: TBD pending IP BHU placement  Code Status: Level 1 - Full Code    Subjective:   Patient seen and examined  No critical events overnight  Was seen by psychiatry and signed a 12 for voluntary admission to [address] [place] [address]    Denies CP, SOB, N/V/F/C, abdominal pain, diaphoresis or tremulousness  Noted to be mildly hypertensive and Inderal was increased, with significant subjective improvement per patient  Per psychiatric recommendations, patient should not be allowed to leave AMA and will need a 302 if he tries  Otherwise patient medically cleared for discharge  Objective:   Vitals:   Temp (24hrs), Av 6 °F (37 °C), Min:98 4 °F (36 9 °C), Max:98 8 °F (37 1 °C)    Temp:  [98 4 °F (36 9 °C)-98 8 °F (37 1 °C)] 98 4 °F (36 9 °C)  HR:  [89-96] 89  Resp:  [16-18] 16  BP: (132-158)/() 149/109  SpO2:  [95 %-96 %] 96 %  Body mass index is 22 77 kg/m²  Input and Output Summary (last 24 hours): Intake/Output Summary (Last 24 hours) at 2021 1537  Last data filed at 2021 0900  Gross per 24 hour   Intake 940 ml   Output    Net 940 ml     Physical Exam:   Physical Exam  Vitals signs reviewed  Constitutional:       General: He is not in acute distress  Appearance: Normal appearance  He is well-developed  He is not ill-appearing, toxic-appearing or diaphoretic  HENT:      Head: Normocephalic and atraumatic  Right Ear: External ear normal       Left Ear: External ear normal       Nose: Nose normal  No congestion or rhinorrhea  Mouth/Throat:      Mouth: Mucous membranes are moist       Pharynx: Oropharynx is clear  No oropharyngeal exudate or posterior oropharyngeal erythema  Eyes:      General: No scleral icterus  Right eye: No discharge  Left eye: No discharge  Extraocular Movements: Extraocular movements intact  Conjunctiva/sclera: Conjunctivae normal       Pupils: Pupils are equal, round, and reactive to light  Comments: Pupils remain dilated   Cardiovascular:      Rate and Rhythm: Normal rate and regular rhythm  Heart sounds: Normal heart sounds  No murmur  No friction rub  No gallop  Pulmonary:      Effort: Pulmonary effort is normal  No respiratory distress  Breath sounds: Normal breath sounds  No stridor  No wheezing, rhonchi or rales  Abdominal:      General: Abdomen is flat  Bowel sounds are normal  There is no distension  Palpations: Abdomen is soft  Tenderness: There is no abdominal tenderness  There is no guarding or rebound  Musculoskeletal:         General: No swelling or deformity  Right lower leg: No edema  Left lower leg: No edema  Skin:     General: Skin is warm and dry  Coloration: Skin is not jaundiced or pale  Neurological:      General: No focal deficit present  Mental Status: He is alert and oriented to person, place, and time  Motor: No weakness or abnormal muscle tone  Gait: Gait normal    Psychiatric:         Behavior: Behavior normal          Thought Content: Thought content normal          Judgment: Judgment normal      Additional Data:   Labs:  Results from last 7 days   Lab Units 01/20/21  0810  01/19/21  1629   WBC Thousand/uL 6 24  --  9 03   HEMOGLOBIN g/dL 16 2  --  17 4*   HEMATOCRIT % 45 9  --  48 6   PLATELETS Thousands/uL 196   < > 272   NEUTROS PCT %  --   --  76*   LYMPHS PCT %  --   --  12*   MONOS PCT %  --   --  11   EOS PCT %  --   --  0    < > = values in this interval not displayed  Results from last 7 days   Lab Units 01/21/21  0543  01/19/21 2019   POTASSIUM mmol/L 4 0   < > 4 2   CHLORIDE mmol/L 100   < > 99*   CO2 mmol/L 27   < > 26   BUN mg/dL 10   < > 6   CREATININE mg/dL 0 80   < > 0 76   CALCIUM mg/dL 9 7   < > 8 7   ALK PHOS U/L  --   --  59   ALT U/L  --   --  54   AST U/L  --   --  44    < > = values in this interval not displayed  Results from last 7 days   Lab Units 01/19/21  2019   INR  0 95     * I Have Reviewed All Lab Data Listed Above  * Additional Pertinent Lab Tests Reviewed:  [address] Admission Reviewed    Imaging:  Imaging Reports Reviewed Today Include: None  Imaging Personally Reviewed by Myself Includes:  None    Recent Cultures (last 7 days):       Last 24 Hours Medication List:   Current Facility-Administered Medications   Medication Dose Route Frequency Provider Last Rate    chlordiazePOXIDE  5 mg Oral Q6H PRN Jaycob Salas MD      FLUoxetine  40 mg Oral Daily Lady Lizzy MD      folic acid  1 mg Oral Daily Theodora Michelle MD      Labetalol HCl  10 mg Intravenous Q4H PRN Jaycob Salas MD      loperamide  2 mg Oral TID PRN Ekta Churchill DO      melatonin  9 mg Oral HS PRN Fabien Faria MD      multivitamin-minerals  1 tablet Oral Daily Theodora Michelle MD      propranolol  20 mg Oral Q12H Koby Bowers MD      thiamine  100 mg Oral Daily Theodora Michelle MD       Today, Patient Was Seen By: Tracy Garcia MD    ** Please Note: This note has been constructed using a voice recognition system   **

## 2021-01-20 NOTE — PLAN OF CARE
Problem: PAIN - ADULT  Goal: Verbalizes/displays adequate comfort level or baseline comfort level  Description: Interventions:  - Encourage patient to monitor pain and request assistance  - Assess pain using appropriate pain scale  - Administer analgesics based on type and severity of pain and evaluate response  - Implement non-pharmacological measures as appropriate and evaluate response  - Consider cultural and social influences on pain and pain management  - Notify physician/advanced practitioner if interventions unsuccessful or patient reports new pain  Outcome: Progressing     Problem: INFECTION - ADULT  Goal: Absence or prevention of progression during hospitalization  Description: INTERVENTIONS:  - Assess and monitor for signs and symptoms of infection  - Monitor lab/diagnostic results  - Monitor all insertion sites, i e  indwelling lines, tubes, and drains  - Monitor endotracheal if appropriate and nasal secretions for changes in amount and color  - Randolph appropriate cooling/warming therapies per order  - Administer medications as ordered  - Instruct and encourage patient and family to use good hand hygiene technique  - Identify and instruct in appropriate isolation precautions for identified infection/condition  Outcome: Progressing     Problem: SAFETY ADULT  Goal: Patient will remain free of falls  Description: INTERVENTIONS:  - Assess patient frequently for physical needs  -  Identify cognitive and physical deficits and behaviors that affect risk of falls    -  Randolph fall precautions as indicated by assessment   - Educate patient/family on patient safety including physical limitations  - Instruct patient to call for assistance with activity based on assessment  - Modify environment to reduce risk of injury  - Consider OT/PT consult to assist with strengthening/mobility  Outcome: Progressing  Goal: Maintain or return to baseline ADL function  Description: INTERVENTIONS:  -  Assess patient's ability to carry out ADLs; assess patient's baseline for ADL function and identify physical deficits which impact ability to perform ADLs (bathing, care of mouth/teeth, toileting, grooming, dressing, etc )  - Assess/evaluate cause of self-care deficits   - Assess range of motion  - Assess patient's mobility; develop plan if impaired  - Assess patient's need for assistive devices and provide as appropriate  - Encourage maximum independence but intervene and supervise when necessary  - Involve family in performance of ADLs  - Assess for home care needs following discharge   - Consider OT consult to assist with ADL evaluation and planning for discharge  - Provide patient education as appropriate  Outcome: Progressing  Goal: Maintain or return mobility status to optimal level  Description: INTERVENTIONS:  - Assess patient's baseline mobility status (ambulation, transfers, stairs, etc )    - Identify cognitive and physical deficits and behaviors that affect mobility  - Identify mobility aids required to assist with transfers and/or ambulation (gait belt, sit-to-stand, lift, walker, cane, etc )  - Dalton fall precautions as indicated by assessment  - Record patient progress and toleration of activity level on Mobility SBAR; progress patient to next Phase/Stage  - Instruct patient to call for assistance with activity based on assessment  - Consider rehabilitation consult to assist with strengthening/weightbearing, etc   Outcome: Progressing     Problem: DISCHARGE PLANNING  Goal: Discharge to home or other facility with appropriate resources  Description: INTERVENTIONS:  - Identify barriers to discharge w/patient and caregiver  - Arrange for needed discharge resources and transportation as appropriate  - Identify discharge learning needs (meds, wound care, etc )  - Arrange for interpretive services to assist at discharge as needed  - Refer to Case Management Department for coordinating discharge planning if the patient needs post-hospital services based on physician/advanced practitioner order or complex needs related to functional status, cognitive ability, or social support system  Outcome: Progressing     Problem: Knowledge Deficit  Goal: Patient/family/caregiver demonstrates understanding of disease process, treatment plan, medications, and discharge instructions  Description: Complete learning assessment and assess knowledge base    Interventions:  - Provide teaching at level of understanding  - Provide teaching via preferred learning methods  Outcome: Progressing

## 2021-01-20 NOTE — ASSESSMENT & PLAN NOTE
Patient's chloride is 93    Plan  · Most likely due to decreased oral intake  · Continue NS @ 75 cc/hr for now  · Encourage p o   Intake  · Daily BMP while admitted

## 2021-01-20 NOTE — CONSULTS
Consultation - Ashley [address]777 48 y o  male MRN: [id number]  Unit/Bed#:  W -01 Encounter: [id number]    Please refer to note labeled as "progress note" which in reality should have been labeled "Consult note"

## 2021-01-20 NOTE — QUICK NOTE
[facility] Hospitalist Service Attending Physician Attestation Note - Admission    I have seen and examined Lazarus Erie personally and have reviewed the medical record independently  I have reviewed the case with the resident physician including all assessments and the plan of care for each  I agree with the resident physician and offer the following addendum to the below statements by the resident physician:     Date Evaluated: January 19th 2021  Time Evaluated: 8 :15 pm      Subjective / HPI:   This is a 48year old male with background of ETOH abuse who drank 48 beers over the last 2-3 he states  he saw a picture on social media which upset him and which made him drink more than usual      He came in to the ER after encouragement from friends so as to avoid withdrawal symptoms and cut back on drinking  Exam:   Pulse is regular and in 80s  He is not tremulous  He is AAOx3 and not hallucinating  He appears calm and is just to start eating his dinner   Assessment and Plan:  · ETOH intoxication, SI with concern for withdrawal    · Admit and restart home PRN BB  · Ativan as needed on CIWA protocol as he already received ativan in ED  · Could consider toxicology consult tomorrow if more clear signs of withdrawal      Education and Discussions with Family / Patient: Discussed with patient  Resident updated family  Time Spent for Care: 30 minutes  More than 50% of total time spent on counseling and coordination of care as described above  Current Patient Status: Observation   Anticipated Length of Stay:  Patient will be admitted on an Observation basis with an anticipated length of stay of  Less than 2 midnights  Justification for Hospital Stay:     [address] / Care Everywhere Records Reviewed Independently: Yes     For detailed history, assessment, and plan of care, please review the statements below by Dr Dalton Cockayne Bernell Gray, MD

## 2021-01-20 NOTE — ASSESSMENT & PLAN NOTE
Pt is in sodium on admission is 130   Recovered to 137 w/in 24 hrs   Resolved     Plan  · Was likely due to decreased oral intake  · Continue NS @ 75 cc/hr for now  · Continue to monitor  · Trend BMP  · Do NOT over-correct (slow correction rate to < 6-8 / 24 hrs)

## 2021-01-20 NOTE — ASSESSMENT & PLAN NOTE
Continue home medication Prozac 40 mg daily and Inderal 10 mg b i d      Plan:   Rita Quijano Psychiatry consulted, appreciate recs   Continue home meds   Continue to monitor

## 2021-01-20 NOTE — ASSESSMENT & PLAN NOTE
54-year-old with past medical history of alcohol use disorder, anxiety who has a long history of alcohol use  Pt states he has tried rehab several times, most recently on 10/24 pt was in [facility] intensive outpatient program   This was 6 week program and was done virtually, pt continued to drink through this program   On Friday patient's all face but picture of his wife with you may and that she cheated on him with, this caused him to increase his drinking  Pt ingested about 48 beers in the last 2-3 days, he states he has not eaten anything or had any other fluids to drink in the same time period      Plan:  · CIWA protocol  · thiamine and folate  · Toxicology consult - pt has had a prior admission requiring toxicology consult and IV phenobarbital for alcohol withdrawal  · Psychiatry consult  · Trend BMP

## 2021-01-20 NOTE — ASSESSMENT & PLAN NOTE
Patient's chloride is 93    Plan  · Most likely due to decreased oral intake  · Start 75 cc normal saline for 12 hours  · Encourage p o   Intake

## 2021-01-20 NOTE — DISCHARGE SUMMARY
Discharge- Angie Miranda 1970, 48 y o  male MRN: [id number]  Unit/Bed#: W -01 Encounter: [id number]  Primary Care Provider: Gwendolyn Watson MD   Date and time admitted to hospital: 1/19/2021  2:12 PM    Suicidal ideation  Assessment & Plan  Pt made statements in the ED about not wanting to live and feeling like he had nothing to live for anymore   Wife cheating --> increased his drinking   Mentions at least 30 occasions holding a pistol in his hands and contemplating suicide, though he has never followed through with it (always disassembled the gun   Pt states he is able to stay safe in the ED and no longer is having suicidal thoughts   However, per psychiatry discussion with family, he is not safe to go home and will need further psychiatric management   [facility] from medical standpoint [facility]  · Continue home medicine Prozac  · Psychiatry consulted, appreciate recs  · Pt continues to be danger to himself  · Has signed 201  · F/u IP BHU placement  · Continue 1:1 continuous observation    Hyponatremia  Assessment & Plan  Pt is in sodium on admission is 130   Recovered to 137 w/in 24 hrs   Resolved     Plan  · Was likely due to decreased oral intake  · Continue NS @ 75 cc/hr for now  · Continue to monitor  · Trend BMP  · Do NOT over-correct (slow correction rate to < 6-8 / 24 hrs)    * Alcohol intoxication with moderate or severe use disorder ([address] )  Assessment & Plan  48year-old with past medical history of alcohol use disorder, anxiety who has a long history of alcohol use   Rehab several times, most recently on 10/24 pt was in [facility]  This was 6 week program and was done virtually, pt continued to drink through this program   O   Pt ingested about 48 beers in the last 48-72 hrs, he states he has not eaten anything or had any other fluids to drink in the same time period     EtOH 0 167 on admission, down to 0 067 this morning   Subjective symptoms of w/d mild, objective mild, but did receive 4 mg ativan overnight   Some pretty significant mydriasis on exam this morning but no observed tremulousness   In past required toxicology consult and phenobarbital tx required but currently responding well to ativan    Plan:  · Thiamine and folate  · Psychiatry consulted, appreciate recs  · Trend BMP while IP  · Increased Inderal 2/2 HTN this afternoon  · Medically cleared for d/c to IP BHU w/ medications  · May not leave AMA - will need to be 302'd if tries to leave    Chronic diarrhea of unknown origin  Assessment & Plan  Pt states he has had chronic diarrhea but it has never been worked up  He has never spoken with his PCP about his diarrhea  Pt states he cannot remember the last time he has had solid stools and coffee worsens his diarrhea  Plan  · Loperamide p r n  Hypochloremia  Assessment & Plan  Patient's chloride is 93    Plan  · Most likely due to decreased oral intake  · Continue NS @ 75 cc/hr for now  · Encourage p o  Intake  · Daily BMP while admitted    [facility] home medication Prozac 40 mg daily and Inderal 10 mg b i d  Plan:   Estrella Rider Psychiatry consulted, appreciate recs   Continue home meds   Continue to monitor      Discharging Resident Physician: Shy Bourgeois MD  Attending: Darwin France MD  PCP: Melissa Bah MD  Admission Date: 1/19/2021  Discharge Date: 01/22/21    Disposition: [address]    Reason for Admission: EtOH intoxication, SI    Consultations During Hospital Stay:  · Psychiatry    Procedures Performed:   · None    Significant Findings / Test Results:   · EtOH 0 167 on admission  · Na+ 130 on admission - improved throughout stay    Incidental Findings:   · None     Test Results Pending at Discharge (will require follow up):    · None     Outpatient Tests Requested:  · Per PCP    Complications:  None    Hospital Course:     Geena Newman is a 48 y o  male patient who originally presented to the hospital on 1/19/2021 due to acute alcohol intoxication with a history of complicated w/d symptoms (requiring toxicology consult and phenobarbital)  Patient had been drinking heavily for the 48-72 hrs prior to admission and was concerned about w/d symptoms and complications given his prior history  Was also expressing some SI w/ significantly increased interpersonal stress over the last several months after his wife cheated on him and left him with their young son  Denied active SI in ED and contracted for safety  Patient admitted for close monitoring and required 4 mg ativan overnight per CIWA protocol on his first night admitted  Was mildly hyponatremic on admission to 130 which corrected adequately with fluids  Suspect 2/2 EtOH abuse and poor nutritional intake  Was non-diaphoretic but mildly tachycardic and hypertensive this morning w/ symmetrical mydriasis but non-tremulous, w/o hallucinatory sx or other concerning w/d indicators  Patient notes that he has participated in rehabilitation multiple times, including most recently a virtual IOP during which he continued to drink heavily  Was counseled extensively on the importance of changing his drinking habits the risks of w/d during his admission  Was not interested in pursuing rehabilitation after this hospitalization  Patient required fewer doses of ativan overnight his second night and 1/21/21 patient appeared well and stable for d/c  Given his SI expressed in the ED, Psychiatry was consulted and noted that he was extremely high risk and needed to go to [address] [place] [address] [place]  They spent a significant amount of time counseling him on this necessity and discussing with his family (parents) - thus getting enough evidence that he poses a significant risk to himself and would need to be admitted for further psychiatric management  Patient signed a 201 and bed search was initiated   Patient was d/c'd w/o incident to Ay[facility] Linares at Discharge: stable     Discharge Day Visit / Exam:     Subjective: patient seen and examined  No critical events overnight  Feels well and denies CP, SOB, N/V/F/C, abdominal pain, bowel or bladder disturbance    Vitals: Blood Pressure: 139/98 (01/22/21 1104)  Pulse: 87 (01/22/21 1104)  Temperature: 99 1 °F (37 3 °C) (01/22/21 0746)  Temp Source: Oral (01/22/21 0746)  Respirations: 18 (01/22/21 0746)  Height: 5' 6" (167 6 cm) (01/20/21 0900)  Weight - Scale: 64 kg (141 lb 1 5 oz) (01/19/21 2032)  SpO2: 97 % (01/22/21 0746)    Exam:   Physical Exam  Vitals signs reviewed  Constitutional:       General: He is not in acute distress  Appearance: Normal appearance  He is well-developed  He is not ill-appearing, toxic-appearing or diaphoretic  HENT:      Head: Normocephalic and atraumatic  Right Ear: External ear normal       Left Ear: External ear normal       Nose: Nose normal  No congestion or rhinorrhea  Mouth/Throat:      Mouth: Mucous membranes are moist       Pharynx: Oropharynx is clear  No oropharyngeal exudate or posterior oropharyngeal erythema  Eyes:      General: No scleral icterus  Right eye: No discharge  Left eye: No discharge  Extraocular Movements: Extraocular movements intact  Conjunctiva/sclera: Conjunctivae normal    Cardiovascular:      Rate and Rhythm: Normal rate and regular rhythm  Heart sounds: Normal heart sounds  No murmur  No friction rub  No gallop  Pulmonary:      Effort: Pulmonary effort is normal  No respiratory distress  Breath sounds: Normal breath sounds  No stridor  No wheezing, rhonchi or rales  Abdominal:      General: Abdomen is flat  Bowel sounds are normal  There is no distension  Palpations: Abdomen is soft  Tenderness: There is no abdominal tenderness  There is no guarding or rebound  Skin:     General: Skin is warm and dry  Coloration: Skin is not jaundiced  Neurological:      General: No focal deficit present        Mental Status: He is alert and oriented to person, place, and time  Cranial Nerves: No cranial nerve deficit  Motor: No weakness or abnormal muscle tone  Psychiatric:         Mood and Affect: Mood normal          Behavior: Behavior normal      Discussion with Family: Patient updated daily and expressed understanding of and agreement with the management plan    Discharge instructions/Information to patient and family:   See after visit summary for information provided to patient and family  Provisions for Follow-Up Care:  See after visit summary for information related to follow-up care and any pertinent home health orders  Planned Readmission: No     Discharge Medications:  See after visit summary for reconciled discharge medications provided to patient and family        ** Please Note: This note has been constructed using a voice recognition system **

## 2021-01-20 NOTE — PROGRESS NOTES
Consultation - Ashley 4777 48 y o  male MRN: [id number]  Unit/Bed#: W -11 Encounter: [id number]          History of Present Illness   Physician Requesting Consult: Dario Sinha MD  Reason for Consult / Principal Problem: FLOR Wood is a 48 y o  male with anxiety and alcohol use diroder, who was brught into the ED by friend who was concerned about patient getting symptoms of DTs  Patient reporst ongoing life-changing stressors such as being in the midst of a separation, being unsure if it will progress to divorce, not being able ot see or speak to his 6year old son, and being estranged from his 2 older children, isolation related to his chronic alcohol use, covid restrictions  He also states his wife has a restraining order against him  Patient has increased his alcohol intake from beer to vodka, and then beer again, and admits to 48 beers over 3 days to primary team     He described decreased sleep, poor appetite poor overall oral intake, psychotmotor retardation, sense of helplessness and hopelessness and worthlessness, and suicidal thoughts with a concrete plan to shoot himself  He denies poor energy, poor attention and concentration, and denies anhedonia  Patient owns 3 guns/ pistols  He has gone as far as to point himself and admits to making impulsive acts when he in under the effects of alcohol  He denies visual or auditory hallucinations  He denies homicidal ideation  He is currently admitted to [facility] and under the [facility] protocol  Awaiting call back from sister Glendy Vyas: left VM  Collateral information obtained from mother and father:    Patient was being driven into the hospital yesterday and patient was attempting to reach to the car handle to open the door to jump out to traffic  In addition, they inform me that in their presence he made clear suicidal comments about intent to kill himself by shooting himself            Psychiatric Review Of Systems:  Problems with sleep: yes, decreased  Appetite changes: yes, decreased  Weight changes: yes, decreased  Low energy/anergy: no  Low interest/pleasure/anhedonia: no  Somatic symptoms: no  Anxiety/panic: no  Tayla: no  Guilt: no  /hopeless: yes  Self injurious behavior/risky behavior: yes    Historical Information   Prior psychiatric diagnoses: anxiety  Inpatient hospitalizations: Denies  Suicide attempts: Denies  Self-harm behaviors: punched a wall  Violent behavior: Denies  Outpatient treatment: non completed rehab at [facility] on Oct 2020; [facility] on 2020 x 6 w - he said he was drinign alcohol uring online IOP meetings    Psychiatric medication trial: None    Substance Abuse History:  Social History     Tobacco Use    Smoking status: Current Some Day Smoker     Types: Cigars    Smokeless tobacco: Never Used    Tobacco comment: cigars 4-5x/week   Substance Use Topics    Alcohol use: Not Currently     Comment: 4 days sober    Drug use: No      Patient reports alcohol as above  No withdrawal seizures  He admits to DTs       I have assessed this patient for substance use within the past 12 months  History of IP/OP rehabilitation program: as above    Family Psychiatric History:   Patient denies any known family history of psychiatric illness, suicide attempt, or substance abuse    Social History  Marital history: /Civil Union  Children: yes  Living arrangement: Lives alone  Functioning Relationships: poor support system  Education: college graduate  Occupational History: works as a [employer]  Other Pertinent History: None      Traumatic History:   Abuse: none reported  Other Traumatic Events: none reported    Past Medical History:   Diagnosis Date    Alcoholic ([facility] )     Allergic rhinitis        Medical Review Of Systems:  Review of Systems - Negative except as noted in HPI    Meds/Allergies   current meds:   Current Facility-Administered Medications   Medication Dose Route Frequency    chlordiazePOXIDE (LIBRIUM) capsule 5 mg  5 mg Oral Q6H PRN    [START ON 1/21/2021] FLUoxetine (PROzac) capsule 40 mg  40 mg Oral Daily    folic acid (FOLVITE) tablet 1 mg  1 mg Oral Daily    loperamide (IMODIUM) capsule 2 mg  2 mg Oral TID PRN    multivitamin-minerals (CENTRUM) tablet 1 tablet  1 tablet Oral Daily    propranolol (INDERAL) tablet 10 mg  10 mg Oral BID    thiamine (VITAMIN B1) tablet 100 mg  100 mg Oral Daily     Allergies   Allergen Reactions    Other Allergic Rhinitis     seasonal       Objective   Vital signs in last 24 hours:  Temp:  [97 8 °F (36 6 °C)-98 7 °F (37 1 °C)] 97 8 °F (36 6 °C)  HR:  [] 80  Resp:  [14-20] 18  BP: (121-149)/() 144/103    Mental Status Exam:  Appearance:  alert, good eye contact, appears stated age, marginal grooming/hygiene, tattooed and smiling   Behavior:  calm, limited cooperativity, guarded and sitting comfortably   Motor: no abnormal movements and normal gait and balance   Speech:  spontaneous, clear, normal volume, hyperverbal and coherent   Mood:  anxious   Affect:  mood-congruent   Thought Process:  organized, perseverative, tangential, normal rate of thoughts   Thought Content: no verbalized delusions or overt paranoia   Perceptual disturbances: no reported hallucinations and does not appear to be responding to internal stimuli at this time   Risk Potential: No active homicidal ideation, Suicidal Ideation with plan to shoot himself, Passive death wishes, Low potential for aggression based on previous behavior   Cognition: oriented to person, place, time, and situation, memory grossly intact, appears to be of average intelligence, normal abstract reasoning, age-appropriate attention span and concentration and cognition not formally tested   Insight:  Poor   Judgment: Poor     Laboratory results:  I have personally reviewed all pertinent laboratory/tests results          Assessment/Plan   Nadia Saunders is a 48 y o  male with anxiety and depression with worseinig depression and increased alcohol intake  Presents with signs of DT's  On CIWA protocol  He made suicidal remars and gestures, reaching t o one of his 3 guns  Friends and family intervened  Diagnosis:   MDD , severe, without psychotic features versus substance-induced depression, severe  Alcohol use disorder, severe, in withdrawal  Anxiety unspecified    Recommended Treatment:   Patient requires inpatient psychiatric hospitalization  Case Management following for inpatient placement  1-1 for patient and staff safety  Will defer starting maintenance medications pending transfer to inpatient psychiatry  Will follow patient up tomorrow and present him wth the option of 201 versus 302  PATIENT MAY NOT SIGN OUT AMA  IF HE TRIES TO, HE SHOULD 'D     Risks, benefits and possible side effects of Medications:   Risks, benefits, and possible side effects of medications have been explained to the patient, who verbalizes understanding            Melani Merrill MD    This note was not shared with the patient due to this is a psychotherapy note

## 2021-01-21 LAB
ANION GAP SERPL CALCULATED.3IONS-SCNC: 9 MMOL/L (ref 4–13)
BUN SERPL-MCNC: 10 MG/DL (ref 5–25)
CALCIUM SERPL-MCNC: 9.7 MG/DL (ref 8.3–10.1)
CHLORIDE SERPL-SCNC: 100 MMOL/L (ref 100–108)
CO2 SERPL-SCNC: 27 MMOL/L (ref 21–32)
CREAT SERPL-MCNC: 0.8 MG/DL (ref 0.6–1.3)
GFR SERPL CREATININE-BSD FRML MDRD: 104 ML/MIN/1.73SQ M
GLUCOSE SERPL-MCNC: 115 MG/DL (ref 65–140)
POTASSIUM SERPL-SCNC: 4 MMOL/L (ref 3.5–5.3)
SODIUM SERPL-SCNC: 136 MMOL/L (ref 136–145)

## 2021-01-21 PROCEDURE — 99232 SBSQ HOSP IP/OBS MODERATE 35: CPT | Performed by: INTERNAL MEDICINE

## 2021-01-21 PROCEDURE — 80048 BASIC METABOLIC PNL TOTAL CA: CPT | Performed by: PSYCHIATRY & NEUROLOGY

## 2021-01-21 RX ORDER — PROPRANOLOL HYDROCHLORIDE 20 MG/1
20 TABLET ORAL EVERY 12 HOURS SCHEDULED
Status: DISCONTINUED | OUTPATIENT
Start: 2021-01-21 | End: 2021-01-22 | Stop reason: HOSPADM

## 2021-01-21 RX ORDER — PROPRANOLOL HYDROCHLORIDE 20 MG/1
10 TABLET ORAL ONCE
Status: COMPLETED | OUTPATIENT
Start: 2021-01-21 | End: 2021-01-21

## 2021-01-21 RX ORDER — LABETALOL 20 MG/4 ML (5 MG/ML) INTRAVENOUS SYRINGE
10 EVERY 4 HOURS PRN
Status: DISCONTINUED | OUTPATIENT
Start: 2021-01-21 | End: 2021-01-22 | Stop reason: HOSPADM

## 2021-01-21 RX ADMIN — MELATONIN 9 MG: at 21:54

## 2021-01-21 RX ADMIN — PROPRANOLOL HYDROCHLORIDE 10 MG: 20 TABLET ORAL at 11:54

## 2021-01-21 RX ADMIN — Medication 1 TABLET: at 08:28

## 2021-01-21 RX ADMIN — FOLIC ACID 1 MG: 1 TABLET ORAL at 08:28

## 2021-01-21 RX ADMIN — FLUOXETINE 40 MG: 20 CAPSULE ORAL at 17:50

## 2021-01-21 RX ADMIN — PROPRANOLOL HYDROCHLORIDE 20 MG: 20 TABLET ORAL at 21:54

## 2021-01-21 RX ADMIN — THIAMINE HCL TAB 100 MG 100 MG: 100 TAB at 08:28

## 2021-01-21 RX ADMIN — PROPRANOLOL HYDROCHLORIDE 10 MG: 20 TABLET ORAL at 08:28

## 2021-01-21 NOTE — ASSESSMENT & PLAN NOTE
80-year-old with past medical history of alcohol use disorder, anxiety who has a long history of alcohol use   Rehab several times, most recently on 10/24 pt was in [facility] IOP  This was 6 week program and was done virtually, pt continued to drink through this program   O   Pt ingested about 48 beers in the last 48-72 hrs, he states he has not eaten anything or had any other fluids to drink in the same time period     EtOH 0 167 on admission, down to 0 067 this morning   Subjective symptoms of w/d mild, objective mild, but did receive 4 mg ativan overnight   Some pretty significant mydriasis on exam this morning but no observed tremulousness   In past required toxicology consult and phenobarbital tx required but currently responding well to ativan    Plan:  · Thiamine and folate  · Psychiatry consulted, appreciate recs  · Trend BMP while IP  · Increased Inderal 2/2 HTN this afternoon  · Medically cleared for d/c to IP BHU w/ medications  · May not leave AMA - will need to be 302'd if tries to leave

## 2021-01-21 NOTE — ASSESSMENT & PLAN NOTE
Continue home medication Prozac 40 mg daily and Inderal 10 mg b i d      Plan:   [facility] Psychiatry consulted, appreciate recs   Continue home meds   Continue to monitor

## 2021-01-21 NOTE — PLAN OF CARE
Problem: COPING  Goal: Pt/Family able to verbalize concerns and demonstrate effective coping strategies  Description: INTERVENTIONS:  - Assist patient/family to identify coping skills, available support systems and cultural and spiritual values  - Provide emotional support, including active listening and acknowledgement of concerns of patient and caregivers  - Reduce environmental stimuli, as able  - Provide patient education  - Assess for spiritual pain/suffering and initiate spiritual care, including notification of Pastoral Care or yue based community as needed  - Assess effectiveness of coping strategies  Outcome: Progressing  Goal: Will report anxiety at manageable levels  Description: INTERVENTIONS:  - Administer medication as ordered  - Teach and encourage coping skills  - Provide emotional support  - Assess patient/family for anxiety and ability to cope  Outcome: Progressing     Problem: DECISION MAKING  Goal: Pt/Family able to effectively weigh alternatives and participate in decision making related to treatment and care  Description: INTERVENTIONS:  - Identify decision maker  - Determine when there are differences among patient's view, family's view, and healthcare provider's view of patient condition and care goals  - Facilitate patient/family articulation of goals for care  - Help patient/family identify pros/cons of alternative solutions  - Provide information as requested by patient/family  - Respect patient/family rights related to privacy and sharing information   - Serve as a liaison between patient, family and health care team  - Initiate consults as appropriate (Ethics Team, Palliative Care, Family Care Conference, etc )  Outcome: Progressing     Problem: SELF HARM  Goal: Effect of psychiatric condition will be minimized and patient will be protected from self harm  Description: INTERVENTIONS:  - Assess impact of patient's symptoms on level of functioning, self-care needs and offer support as indicated  - Assess patient/family knowledge of depression, impact on illness and need for teaching  - Provide emotional support, presence and reassurance  - Assess for possible suicidal thoughts, ideation or self-harm  If patient expresses suicidal thoughts or statements do not leave alone, notify physician/AP immediately, initiate suicide precautions, and determine need for continual observation    - initiate consults and referrals as appropriate (a mental health professional, Spiritual Care  Outcome: Progressing     Problem: SUBSTANCE USE/ABUSE  Goal: Will have no detox symptoms and will verbalize plan for changing substance-related behavior  Description: INTERVENTIONS:  - Monitor physical status and assess for symptoms of withdrawal  - Administer medication as ordered  - Provide emotional support with 1 on 1 interaction with staff  - Encourage recovery focused program/ addiction education  - Assess for verbalization of changing behaviors related to substance abuse  - Initiate consults and referrals as appropriate (Case Management, Spiritual Care, etc )  Outcome: Progressing  Goal: By discharge, will develop insight into their chemical dependency and sustain motivation to continue in recovery  Description: INTERVENTIONS:  - Attends all daily group sessions and scheduled AA groups  - Actively practices coping skills through participation in the therapeutic community and adherence to program rules  - Reviews and completes assignments from individual treatment plan  - Assist patient development of understanding of their personal cycle of addiction and relapse triggers  Outcome: Progressing  Goal: By discharge, patient will have ongoing treatment plan addressing chemical dependency  Description: INTERVENTIONS:  - Assist patient with resources and/or appointments for ongoing recovery based living  Outcome: Progressing

## 2021-01-21 NOTE — QUICK NOTE
Patient was seen to update and inform about inpatient psychiatry level of care recommendation  Patient agreed to signa 201  Case management informed  Attending informed  Patient may NOT leave AMA  If he tries to do so, he must be 302'd  He should remain on 1:1 at all times

## 2021-01-21 NOTE — DISCHARGE INSTRUCTIONS
Hyponatremia   WHAT YOU NEED TO KNOW: Hyponatremia occurs when the amount of sodium (salt) in your blood is lower than normal  Sodium is an electrolyte (mineral) that helps your muscles, heart, and digestive system work properly  It helps control blood pressure and fluid balance  DISCHARGE INSTRUCTIONS:   Follow up with your healthcare provider as directed: You may need to return for more tests  Write down your questions so you remember to ask them during your visits  Nutrition:  You may need to increase your intake of sodium  Foods that are high in sodium include milk, packaged snacks such as pretzels, or processed meats (henry, sausage, and ham)  Ask your dietitian to help you create a meal plan that is right for you  Liquids: Follow your healthcare provider's advice if you need to limit the amount of liquid you drink  Ask how much liquid to drink each day and which liquids are best for you  You may be asked to drink liquids that have water, sugar, and salt, such as juices, milk, or sports drinks  These liquids help your body hold in fluid and prevent dehydration  Contact your healthcare provider if:   · You have muscle cramps or twitching  · You feel very weak or tired  · You have nausea or are vomiting  You have questions or concerns about your condition or care  Return to the emergency department if:   · You have a seizure  · You have an irregular heartbeat  · You have trouble breathing  · You cannot move your arms and legs  · You are confused or cannot think clearly  Stress   WHAT YOU NEED TO KNOW: Stress is a feeling of tension or strain related to the events and pressures of everyday life  Learn to cope and control your stress to help you function in a healthy way  DISCHARGE INSTRUCTIONS:   Call 911 for any of the following:   · You feel like hurting yourself or someone else  · You feel you are overwhelmed and can no longer handle things by yourself    Contact your healthcare provider if: · You have trouble coping with your stress  · Your symptoms cause problems in your relationships  · You feel depressed  · You have trouble controlling your anger  · You have started to use alcohol, illegal drugs, or prescription medicines, or you increase your current use  · You have questions or concerns about your condition or care  Ways to manage your stress:  Learn what causes you stress  Not all stress can be avoided  Instead, change how you cope with stress by doing any of the following:  · Learn relaxation techniques, such as yoga, meditation, or listening to music  Take at least 30 minutes a day to do something you enjoy  This may include taking a bath or reading a book  · Do deep breathing exercises during times of increased stress  Sit up straight and take a slow, deep breath in through your nose  Then breathe out slowly through your mouth  Take twice as long to breathe out as you do when you breathe in  Repeat this a few times until you feel calmer or more focused  · Set realistic goals for yourself  Make a list of tasks and prioritize them  Focus on one task at a time  · Talk to someone about things that upset you  Talk to a trusted friend, family member, or support group  Try to stop yourself when you think negative, angry, or discouraging thoughts  · Take time to exercise  Start slowly, such as walking 1 to 2 blocks each day  Stretch and relax your muscles often  Ask about the best exercise plan for you  · Eat a variety of healthy foods  Healthy foods include fruits, vegetables, whole-grain breads, low-fat dairy products, beans, lean meats, and fish  Follow up with your healthcare provider as directed:  Write down your questions so you remember to ask them during your visits  Alcohol Use Disorder   WHAT YOU NEED TO KNOW: Alcohol use disorder (AUD) is problem drinking  AUD includes alcohol abuse and alcohol dependence   Alcohol can damage your brain, heart, kidneys, lungs, and liver  Your risk of stroke is greater if you have 5 or more drinks each day  If you are pregnant, you and your baby are at risk for serious health problems  No amount of alcohol is safe during pregnancy  DISCHARGE INSTRUCTIONS:   Call your local emergency number (911 in the [address]) if:   · You have seizures  Call your doctor if:   · Your heart is beating faster than usual   · You have hallucinations  · You cannot remember what happens while you are drinking  · You are anxious and have nausea  · Your hands are shaky and you are sweating heavily  · You have questions or concerns about your condition or care  Follow up with your healthcare provider as directed:  Do not try to stop drinking on your own  Your healthcare provider may need to help you withdraw from alcohol safely  He or she may need to admit you to the hospital  You may also need any of the following:  · Medicines to decrease your craving for alcohol  · Support groups such as Alcoholics Anonymous   · Therapy from a psychiatrist or psychologist   · Admission to an inpatient facility for treatment for severe AUD  For support and more information:   · Substance Abuse and [facility] [address] [place]  Web Address: https://[employer]/  http://[employer]/ymous  Web Address: [address] Information is for End User's use only and may not be sold, redistributed or otherwise used for commercial purposes  All illustrations and images included in CareNotes® are the copyrighted property of A D A M , Inc  or [employer] above information is an  only  It is not intended as medical advice for individual conditions or treatments  Talk to your doctor, nurse or pharmacist before following any medical regimen to see if it is safe and effective for you

## 2021-01-21 NOTE — PLAN OF CARE
Problem: SAFETY ADULT  Goal: Patient will remain free of falls  Description: INTERVENTIONS:  - Assess patient frequently for physical needs  -  Identify cognitive and physical deficits and behaviors that affect risk of falls    -  [place] fall precautions as indicated by assessment   - Educate patient/family on patient safety including physical limitations  - Instruct patient to call for assistance with activity based on assessment  - Modify environment to reduce risk of injury  - Consider OT/PT consult to assist with strengthening/mobility  Outcome: Progressing  Goal: Maintain or return to baseline ADL function  Description: INTERVENTIONS:  -  Assess patient's ability to carry out ADLs; assess patient's baseline for ADL function and identify physical deficits which impact ability to perform ADLs (bathing, care of mouth/teeth, toileting, grooming, dressing, etc )  - Assess/evaluate cause of self-care deficits   - Assess range of motion  - Assess patient's mobility; develop plan if impaired  - Assess patient's need for assistive devices and provide as appropriate  - Encourage maximum independence but intervene and supervise when necessary  - Involve family in performance of ADLs  - Assess for home care needs following discharge   - Consider OT consult to assist with ADL evaluation and planning for discharge  - Provide patient education as appropriate  Outcome: Progressing  Goal: Maintain or return mobility status to optimal level  Description: INTERVENTIONS:  - Assess patient's baseline mobility status (ambulation, transfers, stairs, etc )    - Identify cognitive and physical deficits and behaviors that affect mobility  - Identify mobility aids required to assist with transfers and/or ambulation (gait belt, sit-to-stand, lift, walker, cane, etc )  - [place] fall precautions as indicated by assessment  - Record patient progress and toleration of activity level on Mobility SBAR; progress patient to next Phase/Stage  - Instruct patient to call for assistance with activity based on assessment  - Consider rehabilitation consult to assist with strengthening/weightbearing, etc   Outcome: Progressing

## 2021-01-21 NOTE — PLAN OF CARE
Problem: PAIN - ADULT  Goal: Verbalizes/displays adequate comfort level or baseline comfort level  Description: Interventions:  - Encourage patient to monitor pain and request assistance  - Assess pain using appropriate pain scale  - Administer analgesics based on type and severity of pain and evaluate response  - Implement non-pharmacological measures as appropriate and evaluate response  - Consider cultural and social influences on pain and pain management  - Notify physician/advanced practitioner if interventions unsuccessful or patient reports new pain  Outcome: Progressing     Problem: INFECTION - ADULT  Goal: Absence or prevention of progression during hospitalization  Description: INTERVENTIONS:  - Assess and monitor for signs and symptoms of infection  - Monitor lab/diagnostic results  - Monitor all insertion sites, i e  indwelling lines, tubes, and drains  - Monitor endotracheal if appropriate and nasal secretions for changes in amount and color  - [place] appropriate cooling/warming therapies per order  - Administer medications as ordered  - Instruct and encourage patient and family to use good hand hygiene technique  - Identify and instruct in appropriate isolation precautions for identified infection/condition  Outcome: Progressing     Problem: SAFETY ADULT  Goal: Patient will remain free of falls  Description: INTERVENTIONS:  - Assess patient frequently for physical needs  -  Identify cognitive and physical deficits and behaviors that affect risk of falls    -  [place] fall precautions as indicated by assessment   - Educate patient/family on patient safety including physical limitations  - Instruct patient to call for assistance with activity based on assessment  - Modify environment to reduce risk of injury  - Consider OT/PT consult to assist with strengthening/mobility  Outcome: Progressing  Goal: Maintain or return to baseline ADL function  Description: INTERVENTIONS:  -  Assess patient's ability to carry out ADLs; assess patient's baseline for ADL function and identify physical deficits which impact ability to perform ADLs (bathing, care of mouth/teeth, toileting, grooming, dressing, etc )  - Assess/evaluate cause of self-care deficits   - Assess range of motion  - Assess patient's mobility; develop plan if impaired  - Assess patient's need for assistive devices and provide as appropriate  - Encourage maximum independence but intervene and supervise when necessary  - Involve family in performance of ADLs  - Assess for home care needs following discharge   - Consider OT consult to assist with ADL evaluation and planning for discharge  - Provide patient education as appropriate  Outcome: Progressing  Goal: Maintain or return mobility status to optimal level  Description: INTERVENTIONS:  - Assess patient's baseline mobility status (ambulation, transfers, stairs, etc )    - Identify cognitive and physical deficits and behaviors that affect mobility  - Identify mobility aids required to assist with transfers and/or ambulation (gait belt, sit-to-stand, lift, walker, cane, etc )  - [place] fall precautions as indicated by assessment  - Record patient progress and toleration of activity level on Mobility SBAR; progress patient to next Phase/Stage  - Instruct patient to call for assistance with activity based on assessment  - Consider rehabilitation consult to assist with strengthening/weightbearing, etc   Outcome: Progressing     Problem: DISCHARGE PLANNING  Goal: Discharge to home or other facility with appropriate resources  Description: INTERVENTIONS:  - Identify barriers to discharge w/patient and caregiver  - Arrange for needed discharge resources and transportation as appropriate  - Identify discharge learning needs (meds, wound care, etc )  - Arrange for interpretive services to assist at discharge as needed  - Refer to Case Management Department for coordinating discharge planning if the patient needs post-hospital services based on physician/advanced practitioner order or complex needs related to functional status, cognitive ability, or social support system  Outcome: Progressing     Problem: Knowledge Deficit  Goal: Patient/family/caregiver demonstrates understanding of disease process, treatment plan, medications, and discharge instructions  Description: Complete learning assessment and assess knowledge base    Interventions:  - Provide teaching at level of understanding  - Provide teaching via preferred learning methods  Outcome: Progressing

## 2021-01-22 VITALS
RESPIRATION RATE: 18 BRPM | DIASTOLIC BLOOD PRESSURE: 92 MMHG | SYSTOLIC BLOOD PRESSURE: 139 MMHG | BODY MASS INDEX: 22.68 KG/M2 | WEIGHT: 141.09 LBS | OXYGEN SATURATION: 97 % | TEMPERATURE: 99.1 F | HEART RATE: 79 BPM | HEIGHT: 66 IN

## 2021-01-22 PROBLEM — E87.1 HYPONATREMIA: Status: RESOLVED | Noted: 2021-01-19 | Resolved: 2021-01-22

## 2021-01-22 PROBLEM — E87.8 HYPOCHLOREMIA: Status: RESOLVED | Noted: 2021-01-19 | Resolved: 2021-01-22

## 2021-01-22 PROBLEM — F10.229 ALCOHOL INTOXICATION WITH MODERATE OR SEVERE USE DISORDER (HCC): Status: RESOLVED | Noted: 2021-01-19 | Resolved: 2021-01-22

## 2021-01-22 LAB
FLUAV RNA RESP QL NAA+PROBE: NEGATIVE
FLUBV RNA RESP QL NAA+PROBE: NEGATIVE
RSV RNA RESP QL NAA+PROBE: NEGATIVE
SARS-COV-2 RNA RESP QL NAA+PROBE: NEGATIVE

## 2021-01-22 PROCEDURE — 99239 HOSP IP/OBS DSCHRG MGMT >30: CPT | Performed by: INTERNAL MEDICINE

## 2021-01-22 PROCEDURE — 0241U HB NFCT DS VIR RESP RNA 4 TRGT: CPT | Performed by: PSYCHIATRY & NEUROLOGY

## 2021-01-22 RX ORDER — ACETAMINOPHEN 325 MG/1
650 TABLET ORAL EVERY 6 HOURS PRN
Status: DISCONTINUED | OUTPATIENT
Start: 2021-01-22 | End: 2021-01-22 | Stop reason: HOSPADM

## 2021-01-22 RX ORDER — LANOLIN ALCOHOL/MO/W.PET/CERES
9 CREAM (GRAM) TOPICAL
Qty: 10 TABLET | Refills: 0
Start: 2021-01-22 | End: 2021-09-14 | Stop reason: ALTCHOICE

## 2021-01-22 RX ORDER — FOLIC ACID 1 MG/1
1 TABLET ORAL DAILY
Qty: 30 TABLET | Refills: 0
Start: 2021-01-23 | End: 2021-02-17 | Stop reason: ALTCHOICE

## 2021-01-22 RX ADMIN — THIAMINE HCL TAB 100 MG 100 MG: 100 TAB at 08:44

## 2021-01-22 RX ADMIN — FLUOXETINE 40 MG: 20 CAPSULE ORAL at 16:06

## 2021-01-22 RX ADMIN — FOLIC ACID 1 MG: 1 TABLET ORAL at 08:44

## 2021-01-22 RX ADMIN — PROPRANOLOL HYDROCHLORIDE 20 MG: 20 TABLET ORAL at 08:44

## 2021-01-22 RX ADMIN — Medication 1 TABLET: at 08:44

## 2021-01-22 RX ADMIN — ACETAMINOPHEN 650 MG: 325 TABLET, FILM COATED ORAL at 09:32

## 2021-01-22 NOTE — CASE MANAGEMENT
CM received call from [facility] at the [facility], patient accepted  Pablo stating facility will go for prior authorization  CM provided patient's insurance information  [facility] gave approval for this CM to set up transportation ASAP  CM obtained verbal approval from CM CC for [facility] transport as per rounding patient is appropriate for [facility]  CM notified [facility] of accepting facility  CM to request transport and follow

## 2021-01-22 NOTE — CASE MANAGEMENT
Per chart review, patient is cleared for discharge to [facility]  Per Andreas Eric at [facility] intake, no in network beds available at this time  Per Khai Humphreyk at [facility], no beds available  Per Komal Mar at [facility], possible bed available  Clinicals faxed  Per Stevan Escalante at [facility], 201 beds available  Clinicals faxed  Per Lydia Chen at [facility], no beds available  Per Josafat Lawrence at [facility],  possible bed available  Clinicals faxed  Per admissions at [facility], unable to accommodate a 201 at this time  Per Susan Waterman at [facility],  possible bed available  Clinicals faxed to [phone]

## 2021-01-22 NOTE — PLAN OF CARE
Problem: COPING  Goal: Pt/Family able to verbalize concerns and demonstrate effective coping strategies  Description: INTERVENTIONS:  - Assist patient/family to identify coping skills, available support systems and cultural and spiritual values  - Provide emotional support, including active listening and acknowledgement of concerns of patient and caregivers  - Reduce environmental stimuli, as able  - Provide patient education  - Assess for spiritual pain/suffering and initiate spiritual care, including notification of Pastoral Care or yue based community as needed  - Assess effectiveness of coping strategies  Outcome: Progressing  Goal: Will report anxiety at manageable levels  Description: INTERVENTIONS:  - Administer medication as ordered  - Teach and encourage coping skills  - Provide emotional support  - Assess patient/family for anxiety and ability to cope  Outcome: Progressing     Problem: DECISION MAKING  Goal: Pt/Family able to effectively weigh alternatives and participate in decision making related to treatment and care  Description: INTERVENTIONS:  - Identify decision maker  - Determine when there are differences among patient's view, family's view, and healthcare provider's view of patient condition and care goals  - Facilitate patient/family articulation of goals for care  - Help patient/family identify pros/cons of alternative solutions  - Provide information as requested by patient/family  - Respect patient/family rights related to privacy and sharing information   - Serve as a liaison between patient, family and health care team  - Initiate consults as appropriate (Ethics Team, Palliative Care, Family Care Conference, etc )  Outcome: Progressing     Problem: SELF HARM  Goal: Effect of psychiatric condition will be minimized and patient will be protected from self harm  Description: INTERVENTIONS:  - Assess impact of patient's symptoms on level of functioning, self-care needs and offer support as indicated  - Assess patient/family knowledge of depression, impact on illness and need for teaching  - Provide emotional support, presence and reassurance  - Assess for possible suicidal thoughts, ideation or self-harm  If patient expresses suicidal thoughts or statements do not leave alone, notify physician/AP immediately, initiate suicide precautions, and determine need for continual observation    - initiate consults and referrals as appropriate (a mental health professional, Spiritual Care  Outcome: Progressing     Problem: SUBSTANCE USE/ABUSE  Goal: Will have no detox symptoms and will verbalize plan for changing substance-related behavior  Description: INTERVENTIONS:  - Monitor physical status and assess for symptoms of withdrawal  - Administer medication as ordered  - Provide emotional support with 1 on 1 interaction with staff  - Encourage recovery focused program/ addiction education  - Assess for verbalization of changing behaviors related to substance abuse  - Initiate consults and referrals as appropriate (Case Management, Spiritual Care, etc )  Outcome: Progressing  Goal: By discharge, will develop insight into their chemical dependency and sustain motivation to continue in recovery  Description: INTERVENTIONS:  - Attends all daily group sessions and scheduled AA groups  - Actively practices coping skills through participation in the therapeutic community and adherence to program rules  - Reviews and completes assignments from individual treatment plan  - Assist patient development of understanding of their personal cycle of addiction and relapse triggers  Outcome: Progressing  Goal: By discharge, patient will have ongoing treatment plan addressing chemical dependency  Description: INTERVENTIONS:  - Assist patient with resources and/or appointments for ongoing recovery based living  Outcome: Progressing     Problem: SAFETY ADULT  Goal: Patient will remain free of falls  Description: INTERVENTIONS:  - Assess patient frequently for physical needs  -  Identify cognitive and physical deficits and behaviors that affect risk of falls    -  [place] fall precautions as indicated by assessment   - Educate patient/family on patient safety including physical limitations  - Instruct patient to call for assistance with activity based on assessment  - Modify environment to reduce risk of injury  - Consider OT/PT consult to assist with strengthening/mobility  Outcome: Progressing  Goal: Maintain or return to baseline ADL function  Description: INTERVENTIONS:  -  Assess patient's ability to carry out ADLs; assess patient's baseline for ADL function and identify physical deficits which impact ability to perform ADLs (bathing, care of mouth/teeth, toileting, grooming, dressing, etc )  - Assess/evaluate cause of self-care deficits   - Assess range of motion  - Assess patient's mobility; develop plan if impaired  - Assess patient's need for assistive devices and provide as appropriate  - Encourage maximum independence but intervene and supervise when necessary  - Involve family in performance of ADLs  - Assess for home care needs following discharge   - Consider OT consult to assist with ADL evaluation and planning for discharge  - Provide patient education as appropriate  Outcome: Progressing  Goal: Maintain or return mobility status to optimal level  Description: INTERVENTIONS:  - Assess patient's baseline mobility status (ambulation, transfers, stairs, etc )    - Identify cognitive and physical deficits and behaviors that affect mobility  - Identify mobility aids required to assist with transfers and/or ambulation (gait belt, sit-to-stand, lift, walker, cane, etc )  - [place] fall precautions as indicated by assessment  - Record patient progress and toleration of activity level on Mobility SBAR; progress patient to next Phase/Stage  - Instruct patient to call for assistance with activity based on assessment  - Consider rehabilitation consult to assist with strengthening/weightbearing, etc   Outcome: Progressing

## 2021-01-22 NOTE — CASE MANAGEMENT
Per Jeff Ronquillo at [facility], transport arranged at [phone] with [facility]  CM waited on hold for 21 minutes prior to s/w Елена at [facility] to notify of transport time  Per Bridget Castanon no RN report call or faxed copy of DCI is required  Transport time approved  Signed 201 is in patient's chart - 201 will need to go with patient for transport to facility  Paper copy of DCI to go with patient for discharge  RN notified by phone

## 2021-01-25 ENCOUNTER — TRANSITIONAL CARE MANAGEMENT (OUTPATIENT)
Dept: FAMILY MEDICINE CLINIC | Facility: CLINIC | Age: 51
End: 2021-01-25

## 2021-01-26 ENCOUNTER — TELEPHONE (OUTPATIENT)
Dept: FAMILY MEDICINE CLINIC | Facility: CLINIC | Age: 51
End: 2021-01-26

## 2021-01-26 DIAGNOSIS — R00.2 PALPITATIONS: ICD-10-CM

## 2021-01-26 DIAGNOSIS — F41.9 ANXIETY: ICD-10-CM

## 2021-01-26 RX ORDER — PROPRANOLOL HYDROCHLORIDE 20 MG/1
20 TABLET ORAL 2 TIMES DAILY
Qty: 60 TABLET | Refills: 0 | Status: SHIPPED | OUTPATIENT
Start: 2021-01-26 | End: 2021-02-17 | Stop reason: SDUPTHER

## 2021-01-26 NOTE — TELEPHONE ENCOUNTER
Patient phoned stating while he was in hospital, Propanolol was increased to 20 mg b i d  and he feels so much better  He requested Rx for 20 mg b i d  to be called to [facility] on [address]      He asked if he can receive phone call to let him know when this has been done so he can  at pharmacy    Appt scheduled for Feb 1st w Dr Jason Hercules

## 2021-01-26 NOTE — TELEPHONE ENCOUNTER
Reviewed chart, not clear in D/C summary however inpatient progress note with propranolol 20 mg twice daily  Refill sent to the pharmacy

## 2021-02-01 NOTE — CONSULTS
Consultation - Ashley 4777 48 y o  male MRN: [id number]  Unit/Bed#: W -36 Encounter: [id number]          History of Present Illness   Physician Requesting Consult: Lorrie Blue MD  Reason for Consult / Principal Problem: FLOR Saez is a 48 y o  male with anxiety and alcohol use diroder, who was brught into the ED by friend who was concerned about patient getting symptoms of DTs  Patient reporst ongoing life-changing stressors such as being in the midst of a separation, being unsure if it will progress to divorce, not being able ot see or speak to his 6year old son, and being estranged from his 2 older children, isolation related to his chronic alcohol use, covid restrictions  He also states his wife has a restraining order against him  Patient has increased his alcohol intake from beer to vodka, and then beer again, and admits to 48 beers over 3 days to primary team     He described decreased sleep, poor appetite poor overall oral intake, psychotmotor retardation, sense of helplessness and hopelessness and worthlessness, and suicidal thoughts with a concrete plan to shoot himself  He denies poor energy, poor attention and concentration, and denies anhedonia  Patient owns 3 guns/ pistols  He has gone as far as to point himself and admits to making impulsive acts when he in under the effects of alcohol  He denies visual or auditory hallucinations  He denies homicidal ideation  He is currently admitted to [facility] and under the [facility] protocol  Awaiting call back from sister Isaac Elisabet: left VM  Collateral information obtained from mother and father:    Patient was being driven into the hospital yesterday and patient was attempting to reach to the car handle to open the door to jump out to traffic  In addition, they inform me that in their presence he made clear suicidal comments about intent to kill himself by shooting himself            Psychiatric Review Of Systems:  Problems with sleep: yes, decreased  Appetite changes: yes, decreased  Weight changes: yes, decreased  Low energy/anergy: no  Low interest/pleasure/anhedonia: no  Somatic symptoms: no  Anxiety/panic: no  Tayla: no  Guilt: no  /hopeless: yes  Self injurious behavior/risky behavior: yes    Historical Information   Prior psychiatric diagnoses: anxiety  Inpatient hospitalizations: Denies  Suicide attempts: Denies  Self-harm behaviors: punched a wall  Violent behavior: Denies  Outpatient treatment: non completed rehab at  [facility] on Oct 2020; [facility] on 2020 x 6 w - he said he was drinign alcohol uring online IOP meetings    Psychiatric medication trial: None    Substance Abuse History:  Social History     Tobacco Use    Smoking status: Current Some Day Smoker     Types: Cigars    Smokeless tobacco: Never Used    Tobacco comment: cigars 4-5x/week   Substance Use Topics    Alcohol use: Not Currently     Comment: 4 days sober    Drug use: No      Patient reports alcohol as above  No withdrawal seizures  He admits to DTs       I have assessed this patient for substance use within the past 12 months  History of IP/OP rehabilitation program: as above    Family Psychiatric History:   Patient denies any known family history of psychiatric illness, suicide attempt, or substance abuse    Social History  Marital history: /Civil Union  Children: yes  Living arrangement: Lives alone  Functioning Relationships: poor support system  Education: college graduate  Occupational History: works as a [employer] services  Other Pertinent History: None      Traumatic History:   Abuse: none reported  Other Traumatic Events: none reported    Past Medical History:   Diagnosis Date    Alcoholic ([facility] [address] )     Allergic rhinitis        Medical Review Of Systems:  Review of Systems - Negative except as noted in HPI    Meds/Allergies   current meds:   Current Facility-Administered Medications   Medication Dose Route Frequency    chlordiazePOXIDE (LIBRIUM) capsule 5 mg  5 mg Oral Q6H PRN    [START ON 1/21/2021] FLUoxetine (PROzac) capsule 40 mg  40 mg Oral Daily    folic acid (FOLVITE) tablet 1 mg  1 mg Oral Daily    loperamide (IMODIUM) capsule 2 mg  2 mg Oral TID PRN    multivitamin-minerals (CENTRUM) tablet 1 tablet  1 tablet Oral Daily    propranolol (INDERAL) tablet 10 mg  10 mg Oral BID    thiamine (VITAMIN B1) tablet 100 mg  100 mg Oral Daily     Allergies   Allergen Reactions    Other Allergic Rhinitis     seasonal       Objective   Vital signs in last 24 hours:  Temp:  [97 8 °F (36 6 °C)-98 7 °F (37 1 °C)] 97 8 °F (36 6 °C)  HR:  [] 80  Resp:  [14-20] 18  BP: (121-149)/() 144/103    Mental Status Exam:  Appearance:  alert, good eye contact, appears stated age, marginal grooming/hygiene, tattooed and smiling   Behavior:  calm, limited cooperativity, guarded and sitting comfortably   Motor: no abnormal movements and normal gait and balance   Speech:  spontaneous, clear, normal volume, hyperverbal and coherent   Mood:  anxious   Affect:  mood-congruent   Thought Process:  organized, perseverative, tangential, normal rate of thoughts   Thought Content: no verbalized delusions or overt paranoia   Perceptual disturbances: no reported hallucinations and does not appear to be responding to internal stimuli at this time   Risk Potential: No active homicidal ideation, Suicidal Ideation with plan to shoot himself, Passive death wishes, Low potential for aggression based on previous behavior   Cognition: oriented to person, place, time, and situation, memory grossly intact, appears to be of average intelligence, normal abstract reasoning, age-appropriate attention span and concentration and cognition not formally tested   Insight:  Poor   Judgment: Poor     Laboratory results:  I have personally reviewed all pertinent laboratory/tests results          Assessment/Plan   Ferdinand Wood is a 48 y o  male with anxiety and depression with worseinig depression and increased alcohol intake  Presents with signs of DT's  On [facility] protocol  He made suicidal remars and gestures, reaching t o one of his 3 guns  Friends and family intervened  Diagnosis:   MDD , severe, without psychotic features versus substance-induced depression, severe  Alcohol use disorder, severe, in withdrawal  Anxiety unspecified    Recommended Treatment:   Patient requires inpatient psychiatric hospitalization  Case Management following for inpatient placement  1-1 for patient and staff safety  Will defer starting maintenance medications pending transfer to inpatient psychiatry  Will follow patient up tomorrow and present him wth the option of 201 versus 302  PATIENT MAY NOT SIGN OUT AMA  IF HE TRIES TO, HE SHOULD 'D     Risks, benefits and possible side effects of Medications:   Risks, benefits, and possible side effects of medications have been explained to the patient, who verbalizes understanding            Narcisa Alves MD    This note was not shared with the patient due to this is a psychotherapy note

## 2021-02-13 DIAGNOSIS — R00.2 PALPITATIONS: ICD-10-CM

## 2021-02-13 DIAGNOSIS — F41.9 ANXIETY: ICD-10-CM

## 2021-02-15 NOTE — TELEPHONE ENCOUNTER
Auto refill request, does patient need refills, previous Rx sent only two weeks ago, is patient taking 1 20 mg tablet twice daily as directed or taking more?

## 2021-02-15 NOTE — TELEPHONE ENCOUNTER
Spoke with patient, he does need refill  He is not completely out of Rx, which explains the request so early  He said he has about 10 days left but he said since he has been taking the medication properly, it has made a world of difference  He just got our of rehab as well, he is 10 days sober and wanted to make an appt with you to discuss things  appt made for wed 02/17/2021

## 2021-02-16 NOTE — PROGRESS NOTES
FAMILY MEDICINE PROGRESS NOTE    Date of Service: 21  Primary Care Provider:   Adenike Ferreira MD     Name: Nate Chadwick       : 1970       Age:50 y o  Sex: male      MRN: [id number]      Chief Complaint:Follow-up (Rehab; Discuss meds)     ASSESSMENT and PLAN:  Nate Chadwick is a 48 y o  male with:     Problem List Items Addressed This Visit        Other    Alcohol use disorder, severe, in early remission [facility]) - Primary     Patient has been in medical detox twice in the last month, reportedly sober for 11 days  He is not currently in [place] [id number] or other rehab program  He is doing well with propranolol to manage palpitations, he feels this medication is the reason he is not drinking  Counseled on the importance of having a structured routine, seeing a therapist           Anxiety    Relevant Medications    propranolol (INDERAL) 20 mg tablet    Drug-induced erectile dysfunction     Patient previously on Cialis for ED, which has worsened with Prozac  Will resume today, instructed patient to take in half as needed  Relevant Medications    tadalafil (CIALIS) 20 MG tablet    Elevated blood pressure reading in office without diagnosis of hypertension     BP Readings from Last 3 Encounters:   21 140/90   21 139/92   20 136/86     Patient with elevated blood pressure reading in the office  Likely related to alcohol withdrawal  Will continue to monitor, if persistently elevated while patient remains sober will start antihypertensive medication  Other Visit Diagnoses     Palpitations        Relevant Medications    propranolol (INDERAL) 20 mg tablet          SUBJECTIVE:  Nate Chadwick is a 48 y o  male who presents today with a chief complaint of Follow-up (Rehab; Discuss meds)  HPI     Patient presents today for follow-up  He has been in medical detox twice since his last visit  He was in [facility]    He was in detox , he checked out because he does not feel AA, 12-step program is not helpful for him  He was admitted to the hospital one month ago for alcohol intoxication and withdrawal and suicidal ideation  He was continue to home Prozac 40 mg daily, and propranolol was increased to 20 mg twice daily  He was subsequently admitted to behavioral health unit in [facility]  He tells me that he was not suicidal, he was "drunk and upset "     He reports that he is doing really well on propranolol  This is controlling his palpitations  He has not had a drink for over 11 days  He states that "he does not need the bottle " He was previously using alcohol to cope with the "heart flutter " He is now taking propranolol which helps his palpitations  He is going to be seeing a counselor today  He is asking for refills on Cialis  He states Prozac is causing erectile dysfunction  Review of Systems   Respiratory: Negative for shortness of breath  Cardiovascular: Negative for chest pain and palpitations  Psychiatric/Behavioral: Positive for sleep disturbance  The patient is nervous/anxious  I have reviewed the patient's Past Medical History      Current Outpatient Medications:     Fexofenadine HCl (ALLEGRA ALLERGY PO), Take by mouth as needed , Disp: , Rfl:     FLUoxetine (PROzac) 40 MG capsule, Take 1 capsule (40 mg total) by mouth daily, Disp: 30 capsule, Rfl: 3    melatonin 3 mg, Take 3 tablets (9 mg total) by mouth daily at bedtime as needed (sleep disturbance, insomnia) (Patient taking differently: Take 10 mg by mouth daily at bedtime as needed (sleep disturbance, insomnia) ), Disp: 10 tablet, Rfl: 0    multivitamin (THERAGRAN) TABS, Take 1 tablet by mouth daily, Disp: , Rfl:     NON FORMULARY, Take 2 each by mouth 2 (two) times a day De stress balls-ASHWAGANDHA-ginseng and lemon balm, Disp: , Rfl:     propranolol (INDERAL) 20 mg tablet, Take 1 tablet (20 mg total) by mouth 2 (two) times a day, Disp: 60 tablet, Rfl: 5    thiamine (VITAMIN B1) 100 mg tablet, Take 100 mg by mouth daily, Disp: , Rfl:     tadalafil (CIALIS) 20 MG tablet, Take 0 5 tablets (10 mg total) by mouth daily as needed for erectile dysfunction, Disp: 10 tablet, Rfl: 0    OBJECTIVE:  /90   Pulse 78   Temp (!) 97 4 °F (36 3 °C)   Resp 14   Ht 5' 6" (1 676 m)   Wt 62 1 kg (137 lb)   BMI 22 11 kg/m²    BP Readings from Last 3 Encounters:   02/17/21 140/90   01/22/21 139/92   12/22/20 136/86      Wt Readings from Last 3 Encounters:   02/17/21 62 1 kg (137 lb)   01/19/21 64 kg (141 lb 1 5 oz)   12/22/20 63 7 kg (140 lb 8 oz)      Physical Exam  Constitutional:       General: He is not in acute distress  Appearance: Normal appearance  He is not ill-appearing or toxic-appearing  HENT:      Head: Normocephalic and atraumatic  Right Ear: External ear normal       Left Ear: External ear normal       Nose: Nose normal       Mouth/Throat:      Mouth: Mucous membranes are moist    Eyes:      Extraocular Movements: Extraocular movements intact  Conjunctiva/sclera: Conjunctivae normal    Neck:      Musculoskeletal: Normal range of motion and neck supple  No neck rigidity  Pulmonary:      Effort: Pulmonary effort is normal  No respiratory distress  Skin:     Findings: No erythema or rash  Neurological:      General: No focal deficit present  Mental Status: He is alert and oriented to person, place, and time  Psychiatric:         Mood and Affect: Mood normal          Behavior: Behavior normal                 Return in about 4 months (around 6/17/2021) for Annual physical     Nitin Dominguez MD    Note: Portions of the record have been created with voice recognition software  Occasional wrong word or "sound a like" substitutions may have occurred due to the inherent limitations of voice recognition software  Read the chart carefully and recognize, using context, where substitutions have occurred

## 2021-02-17 ENCOUNTER — OFFICE VISIT (OUTPATIENT)
Dept: FAMILY MEDICINE CLINIC | Facility: CLINIC | Age: 51
End: 2021-02-17
Payer: COMMERCIAL

## 2021-02-17 VITALS
WEIGHT: 137 LBS | TEMPERATURE: 97.4 F | BODY MASS INDEX: 22.02 KG/M2 | SYSTOLIC BLOOD PRESSURE: 140 MMHG | DIASTOLIC BLOOD PRESSURE: 90 MMHG | HEART RATE: 78 BPM | HEIGHT: 66 IN | RESPIRATION RATE: 14 BRPM

## 2021-02-17 DIAGNOSIS — R00.2 PALPITATIONS: ICD-10-CM

## 2021-02-17 DIAGNOSIS — F41.9 ANXIETY: ICD-10-CM

## 2021-02-17 DIAGNOSIS — N52.2 DRUG-INDUCED ERECTILE DYSFUNCTION: ICD-10-CM

## 2021-02-17 DIAGNOSIS — F10.21 ALCOHOL USE DISORDER, SEVERE, IN EARLY REMISSION (HCC): Primary | ICD-10-CM

## 2021-02-17 DIAGNOSIS — R03.0 ELEVATED BLOOD PRESSURE READING IN OFFICE WITHOUT DIAGNOSIS OF HYPERTENSION: ICD-10-CM

## 2021-02-17 PROCEDURE — 99214 OFFICE O/P EST MOD 30 MIN: CPT | Performed by: FAMILY MEDICINE

## 2021-02-17 RX ORDER — PROPRANOLOL HYDROCHLORIDE 20 MG/1
20 TABLET ORAL 2 TIMES DAILY
Qty: 60 TABLET | Refills: 5 | Status: SHIPPED | OUTPATIENT
Start: 2021-02-17 | End: 2021-04-20 | Stop reason: SDUPTHER

## 2021-02-17 RX ORDER — PROPRANOLOL HYDROCHLORIDE 20 MG/1
TABLET ORAL
Qty: 60 TABLET | Refills: 1 | OUTPATIENT
Start: 2021-02-17

## 2021-02-17 RX ORDER — TADALAFIL 20 MG/1
10 TABLET ORAL DAILY PRN
Qty: 10 TABLET | Refills: 0 | Status: SHIPPED | OUTPATIENT
Start: 2021-02-17 | End: 2021-04-08 | Stop reason: SDUPTHER

## 2021-02-17 NOTE — ASSESSMENT & PLAN NOTE
Patient has been in medical detox twice in the last month, reportedly sober for 11 days  He is not currently in [place] [id number] or other rehab program  He is doing well with propranolol to manage palpitations, he feels this medication is the reason he is not drinking   Counseled on the importance of having a structured routine, seeing a therapist

## 2021-02-17 NOTE — ASSESSMENT & PLAN NOTE
Patient previously on Cialis for ED, which has worsened with Prozac  Will resume today, instructed patient to take in half as needed

## 2021-02-17 NOTE — ASSESSMENT & PLAN NOTE
BP Readings from Last 3 Encounters:   02/17/21 140/90   01/22/21 139/92   12/22/20 136/86     Patient with elevated blood pressure reading in the office  Likely related to alcohol withdrawal  Will continue to monitor, if persistently elevated while patient remains sober will start antihypertensive medication

## 2021-02-25 ENCOUNTER — HOSPITAL ENCOUNTER (EMERGENCY)
Facility: HOSPITAL | Age: 51
Discharge: HOME | End: 2021-02-25
Attending: EMERGENCY MEDICINE | Admitting: EMERGENCY MEDICINE
Payer: COMMERCIAL

## 2021-02-25 VITALS
DIASTOLIC BLOOD PRESSURE: 72 MMHG | HEART RATE: 95 BPM | OXYGEN SATURATION: 98 % | TEMPERATURE: 98.7 F | SYSTOLIC BLOOD PRESSURE: 123 MMHG | RESPIRATION RATE: 18 BRPM

## 2021-02-25 DIAGNOSIS — F10.920 ALCOHOLIC INTOXICATION WITHOUT COMPLICATION (HCC): Primary | ICD-10-CM

## 2021-02-25 DIAGNOSIS — R11.2 NAUSEA AND VOMITING: ICD-10-CM

## 2021-02-25 LAB
ALBUMIN SERPL BCP-MCNC: 3.4 G/DL (ref 3.5–5)
ALP SERPL-CCNC: 77 U/L (ref 46–116)
ALT SERPL W P-5'-P-CCNC: 59 U/L (ref 12–78)
ANION GAP SERPL CALCULATED.3IONS-SCNC: 17 MMOL/L (ref 4–13)
AST SERPL W P-5'-P-CCNC: 60 U/L (ref 5–45)
BASOPHILS # BLD MANUAL: 0 THOUSAND/UL (ref 0–0.1)
BASOPHILS NFR MAR MANUAL: 0 % (ref 0–1)
BILIRUB SERPL-MCNC: 0.65 MG/DL (ref 0.2–1)
BUN SERPL-MCNC: 19 MG/DL (ref 5–25)
CALCIUM ALBUM COR SERPL-MCNC: 8.8 MG/DL (ref 8.3–10.1)
CALCIUM SERPL-MCNC: 8.3 MG/DL (ref 8.3–10.1)
CHLORIDE SERPL-SCNC: 98 MMOL/L (ref 100–108)
CO2 SERPL-SCNC: 21 MMOL/L (ref 21–32)
CREAT SERPL-MCNC: 1.15 MG/DL (ref 0.6–1.3)
EOSINOPHIL # BLD MANUAL: 0 THOUSAND/UL (ref 0–0.4)
EOSINOPHIL NFR BLD MANUAL: 0 % (ref 0–6)
ERYTHROCYTE [DISTWIDTH] IN BLOOD BY AUTOMATED COUNT: 13.4 % (ref 11.6–15.1)
ETHANOL SERPL-MCNC: 71 MG/DL (ref 0–3)
GFR SERPL CREATININE-BSD FRML MDRD: 74 ML/MIN/1.73SQ M
GLUCOSE SERPL-MCNC: 110 MG/DL (ref 65–140)
HCT VFR BLD AUTO: 46.9 % (ref 36.5–49.3)
HGB BLD-MCNC: 16.3 G/DL (ref 12–17)
LIPASE SERPL-CCNC: 189 U/L (ref 73–393)
LYMPHOCYTES # BLD AUTO: 0.85 THOUSAND/UL (ref 0.6–4.47)
LYMPHOCYTES # BLD AUTO: 8 % (ref 14–44)
MAGNESIUM SERPL-MCNC: 1.8 MG/DL (ref 1.6–2.6)
MCH RBC QN AUTO: 32.1 PG (ref 26.8–34.3)
MCHC RBC AUTO-ENTMCNC: 34.8 G/DL (ref 31.4–37.4)
MCV RBC AUTO: 92 FL (ref 82–98)
MONOCYTES # BLD AUTO: 0.42 THOUSAND/UL (ref 0–1.22)
MONOCYTES NFR BLD: 4 % (ref 4–12)
NEUTROPHILS # BLD MANUAL: 9.32 THOUSAND/UL (ref 1.85–7.62)
NEUTS SEG NFR BLD AUTO: 88 % (ref 43–75)
NRBC BLD AUTO-RTO: 0 /100 WBCS
PHOSPHATE SERPL-MCNC: 3.9 MG/DL (ref 2.7–4.5)
PLATELET # BLD AUTO: 391 THOUSANDS/UL (ref 149–390)
PLATELET BLD QL SMEAR: ADEQUATE
PMV BLD AUTO: 8.6 FL (ref 8.9–12.7)
POTASSIUM SERPL-SCNC: 4.8 MMOL/L (ref 3.5–5.3)
PROT SERPL-MCNC: 6.8 G/DL (ref 6.4–8.2)
RBC # BLD AUTO: 5.08 MILLION/UL (ref 3.88–5.62)
RBC MORPH BLD: NORMAL
SODIUM SERPL-SCNC: 136 MMOL/L (ref 136–145)
TOTAL CELLS COUNTED SPEC: 100
TROPONIN I SERPL-MCNC: <0.02 NG/ML
WBC # BLD AUTO: 10.59 THOUSAND/UL (ref 4.31–10.16)

## 2021-02-25 PROCEDURE — 85027 COMPLETE CBC AUTOMATED: CPT | Performed by: EMERGENCY MEDICINE

## 2021-02-25 PROCEDURE — 83690 ASSAY OF LIPASE: CPT | Performed by: EMERGENCY MEDICINE

## 2021-02-25 PROCEDURE — 93005 ELECTROCARDIOGRAM TRACING: CPT

## 2021-02-25 PROCEDURE — 96365 THER/PROPH/DIAG IV INF INIT: CPT

## 2021-02-25 PROCEDURE — 96375 TX/PRO/DX INJ NEW DRUG ADDON: CPT

## 2021-02-25 PROCEDURE — 84100 ASSAY OF PHOSPHORUS: CPT | Performed by: EMERGENCY MEDICINE

## 2021-02-25 PROCEDURE — 82077 ASSAY SPEC XCP UR&BREATH IA: CPT | Performed by: EMERGENCY MEDICINE

## 2021-02-25 PROCEDURE — 99284 EMERGENCY DEPT VISIT MOD MDM: CPT

## 2021-02-25 PROCEDURE — 36415 COLL VENOUS BLD VENIPUNCTURE: CPT | Performed by: EMERGENCY MEDICINE

## 2021-02-25 PROCEDURE — 99285 EMERGENCY DEPT VISIT HI MDM: CPT | Performed by: EMERGENCY MEDICINE

## 2021-02-25 PROCEDURE — 83735 ASSAY OF MAGNESIUM: CPT | Performed by: EMERGENCY MEDICINE

## 2021-02-25 PROCEDURE — 85007 BL SMEAR W/DIFF WBC COUNT: CPT | Performed by: EMERGENCY MEDICINE

## 2021-02-25 PROCEDURE — 80053 COMPREHEN METABOLIC PANEL: CPT | Performed by: EMERGENCY MEDICINE

## 2021-02-25 PROCEDURE — 84484 ASSAY OF TROPONIN QUANT: CPT | Performed by: EMERGENCY MEDICINE

## 2021-02-25 RX ORDER — DIAZEPAM 5 MG/ML
5 INJECTION, SOLUTION INTRAMUSCULAR; INTRAVENOUS ONCE
Status: COMPLETED | OUTPATIENT
Start: 2021-02-25 | End: 2021-02-25

## 2021-02-25 RX ORDER — ONDANSETRON 2 MG/ML
1 INJECTION INTRAMUSCULAR; INTRAVENOUS ONCE
Status: COMPLETED | OUTPATIENT
Start: 2021-02-25 | End: 2021-02-25

## 2021-02-25 RX ADMIN — DEXTROSE, SODIUM CHLORIDE, SODIUM LACTATE, POTASSIUM CHLORIDE, AND CALCIUM CHLORIDE 1000 ML: 5; .6; .31; .03; .02 INJECTION, SOLUTION INTRAVENOUS at 22:30

## 2021-02-25 RX ADMIN — DIAZEPAM 5 MG: 10 INJECTION, SOLUTION INTRAMUSCULAR; INTRAVENOUS at 22:25

## 2021-02-26 LAB
ATRIAL RATE: 93 BPM
P AXIS: 58 DEGREES
PR INTERVAL: 154 MS
QRS AXIS: 67 DEGREES
QRSD INTERVAL: 70 MS
QT INTERVAL: 352 MS
QTC INTERVAL: 437 MS
T WAVE AXIS: 36 DEGREES
VENTRICULAR RATE: 93 BPM

## 2021-02-26 PROCEDURE — 93010 ELECTROCARDIOGRAM REPORT: CPT | Performed by: INTERNAL MEDICINE

## 2021-02-26 NOTE — ED PROVIDER NOTES
History  Chief Complaint   Patient presents with    Alcohol Problem     pt reports being an alcoholic  was clean up until wednesday when he had a few beer  then today experienced high stress d/t divorce and chid support hearing and bought a 750mL bottle of whiskey  reports started drinking this mrtarun and by tonight was vomiting uncontrollably prompting call to EMS  Received 4mg zofran and IVF  pt reported feeling significantly better since  Pt admits today "was a bad day" but reports he has no support system to stop him from drinking again  This is a 48 y o  old male who presents to the ED for evaluation of nausea and vomiting  History of alcohol use disorder  Due to multiple family and life stressors, patient water bottle was key and drank a half of it  Patient reports that prior to this he was sober through for 3 weeks and he is very upset the started drinking again  After drinking he began to have severe chills, rigors, body aches, nausea and vomiting  No seizures  He does report feeling palpitations and some discomfort in his chest when his heart was racing  He was unable to tolerate p o  So called 911  No hematemesis, melena  No cough congestion or runny nose, known known COVID exposures  Prior to Admission Medications   Prescriptions Last Dose Informant Patient Reported? Taking?    FLUoxetine (PROzac) 40 MG capsule  Self No No   Sig: Take 1 capsule (40 mg total) by mouth daily   Fexofenadine HCl (ALLEGRA ALLERGY PO)  Self Yes No   Sig: Take by mouth as needed    NON FORMULARY  Self Yes No   Sig: Take 2 each by mouth 2 (two) times a day De stress balls-ASHWAGANDHA-ginseng and lemon balm   melatonin 3 mg  Self No No   Sig: Take 3 tablets (9 mg total) by mouth daily at bedtime as needed (sleep disturbance, insomnia)   Patient taking differently: Take 10 mg by mouth daily at bedtime as needed (sleep disturbance, insomnia)    multivitamin (THERAGRAN) TABS  Self Yes No   Sig: Take 1 tablet by mouth daily   propranolol (INDERAL) 20 mg tablet   No No   Sig: Take 1 tablet (20 mg total) by mouth 2 (two) times a day   tadalafil (CIALIS) 20 MG tablet   No No   Sig: Take 0 5 tablets (10 mg total) by mouth daily as needed for erectile dysfunction   thiamine (VITAMIN B1) 100 mg tablet  Self Yes No   Sig: Take 100 mg by mouth daily      Facility-Administered Medications: None     Past Medical History:   Diagnosis Date    Alcoholic ([address] )     Allergic rhinitis     Palpitation      Past Surgical History:   Procedure Laterality Date    VASECTOMY       Family History   Problem Relation Age of Onset    No Known Problems Mother     No Known Problems Father      I have reviewed and agree with the history as documented  E-Cigarette/Vaping    E-Cigarette Use Never User      E-Cigarette/Vaping Substances     Social History     Tobacco Use    Smoking status: Current Some Day Smoker     Types: Cigars    Smokeless tobacco: Never Used    Tobacco comment: cigars 4-5x/week   Substance Use Topics    Alcohol use: Not Currently     Comment: relapsed 2/25/21    Drug use: No     Review of Systems   Constitutional: Negative for chills, fatigue, fever and unexpected weight change  HENT: Negative for congestion, rhinorrhea and sore throat  Eyes: Negative for redness and visual disturbance  Respiratory: Negative for cough and shortness of breath  Cardiovascular: Positive for palpitations  Negative for chest pain and leg swelling  Gastrointestinal: Negative for abdominal pain, constipation, diarrhea, nausea and vomiting  Endocrine: Negative for cold intolerance and heat intolerance  Genitourinary: Negative for dysuria, frequency and urgency  Musculoskeletal: Negative for back pain  Skin: Negative for rash  Neurological: Negative for dizziness, syncope and numbness  All other systems reviewed and are negative  Physical Exam  Physical Exam  Vitals signs and nursing note reviewed     Constitutional: General: He is not in acute distress  Appearance: He is well-developed  He is not diaphoretic  HENT:      Head: Normocephalic and atraumatic  Nose: Nose normal    Eyes:      Conjunctiva/sclera: Conjunctivae normal       Pupils: Pupils are equal, round, and reactive to light  Neck:      Musculoskeletal: Normal range of motion and neck supple  Cardiovascular:      Rate and Rhythm: Regular rhythm  Tachycardia present  Heart sounds: Normal heart sounds  No murmur  No gallop  Pulmonary:      Effort: Pulmonary effort is normal  No respiratory distress  Breath sounds: Normal breath sounds  No wheezing or rales  Abdominal:      General: Bowel sounds are normal  There is no distension  Palpations: Abdomen is soft  There is no mass  Tenderness: There is no abdominal tenderness  There is no guarding or rebound  Musculoskeletal: Normal range of motion  General: No deformity  Lymphadenopathy:      Cervical: No cervical adenopathy  Skin:     General: Skin is warm and dry  Findings: No erythema or rash  Neurological:      Mental Status: He is alert and oriented to person, place, and time  Cranial Nerves: No cranial nerve deficit         Vital Signs  ED Triage Vitals   Temperature Pulse Respirations Blood Pressure SpO2   02/25/21 2229 02/25/21 2047 02/25/21 2047 02/25/21 2047 02/25/21 2047   98 7 °F (37 1 °C) 78 20 128/79 99 %      Temp Source Heart Rate Source Patient Position - Orthostatic VS BP Location FiO2 (%)   02/25/21 2229 -- -- -- --   Oral          Pain Score       02/25/21 2047       No Pain         ED Medications  Medications   dextrose 5% lactated Ringer's bolus 1,000 mL (1,000 mL Intravenous New Bag 2/25/21 2230)   ondansetron (FOR EMS ONLY) (ZOFRAN) 4 mg/2 mL injection 4 mg (0 mg Does not apply Given to EMS 2/25/21 2027)   diazepam (VALIUM) injection 5 mg (5 mg Intravenous Given 2/25/21 2225)     Diagnostic Studies  Results Reviewed     Procedure Component Value Units Date/Time    CBC and differential [[id number]]  (Abnormal) Collected: 02/25/21 2223    Lab Status: Final result Specimen: Blood from Arm, Left Updated: 02/25/21 2317     WBC 10 59 Thousand/uL      RBC 5 08 Million/uL      Hemoglobin 16 3 g/dL      Hematocrit 46 9 %      MCV 92 fL      MCH 32 1 pg      MCHC 34 8 g/dL      RDW 13 4 %      MPV 8 6 fL      Platelets 043 Thousands/uL      nRBC 0 /100 WBCs     Narrative: This is an appended report  These results have been appended to a previously verified report      Manual Differential(PHLEBS Do Not Order) [[id number]]  (Abnormal) Collected: 02/25/21 2223    Lab Status: Final result Specimen: Blood from Arm, Left Updated: 02/25/21 2317     Segmented % 88 %      Lymphocytes % 8 %      Monocytes % 4 %      Eosinophils, % 0 %      Basophils % 0 %      Absolute Neutrophils 9 32 Thousand/uL      Lymphocytes Absolute 0 85 Thousand/uL      Monocytes Absolute 0 42 Thousand/uL      Eosinophils Absolute 0 00 Thousand/uL      Basophils Absolute 0 00 Thousand/uL      Total Counted 100     RBC Morphology Normal     Platelet Estimate Adequate    Troponin I [[id number]]  (Normal) Collected: 02/25/21 2223    Lab Status: Final result Specimen: Blood from Arm, Left Updated: 02/25/21 2303     Troponin I <0 02 ng/mL     Comprehensive metabolic panel [[id number]]  (Abnormal) Collected: 02/25/21 2223    Lab Status: Final result Specimen: Blood from Arm, Left Updated: 02/25/21 2300     Sodium 136 mmol/L      Potassium 4 8 mmol/L      Chloride 98 mmol/L      CO2 21 mmol/L      ANION GAP 17 mmol/L      BUN 19 mg/dL      Creatinine 1 15 mg/dL      Glucose 110 mg/dL      Calcium 8 3 mg/dL      Corrected Calcium 8 8 mg/dL      AST 60 U/L      ALT 59 U/L      Alkaline Phosphatase 77 U/L      Total Protein 6 8 g/dL      Albumin 3 4 g/dL      Total Bilirubin 0 65 mg/dL      eGFR 74 ml/min/1 73sq m     Narrative:      [place] guidelines for Chronic Kidney Disease (CKD):     Stage 1 with normal or high GFR (GFR > 90 mL/min/1 73 square meters)    Stage 2 Mild CKD (GFR = 60-89 mL/min/1 73 square meters)    Stage 3A Moderate CKD (GFR = 45-59 mL/min/1 73 square meters)    Stage 3B Moderate CKD (GFR = 30-44 mL/min/1 73 square meters)    Stage 4 Severe CKD (GFR = 15-29 mL/min/1 73 square meters)    Stage 5 End Stage CKD (GFR <15 mL/min/1 73 square meters)  Note: GFR calculation is accurate only with a steady state creatinine    Phosphorus [[id number]]  (Normal) Collected: 02/25/21 2223    Lab Status: Final result Specimen: Blood from Arm, Left Updated: 02/25/21 2300     Phosphorus 3 9 mg/dL     Lipase [[id number]]  (Normal) Collected: 02/25/21 2223    Lab Status: Final result Specimen: Blood from Arm, Left Updated: 02/25/21 2300     Lipase 189 u/L     Magnesium [[id number]]  (Normal) Collected: 02/25/21 2223    Lab Status: Final result Specimen: Blood from Arm, Left Updated: 02/25/21 2300     Magnesium 1 8 mg/dL     Ethanol [[id number]]  (Abnormal) Collected: 02/25/21 2223    Lab Status: Final result Specimen: Blood from Arm, Left Updated: 02/25/21 2255     Ethanol Lvl 71 mg/dL              No orders to display          Procedures  ECG 12 Lead Documentation Only    Date/Time: 2/25/2021 10:34 PM  Performed by: Jocelyne Mackenzie MD  Authorized by: Jocelyne Mackenzie MD     Indications / Diagnosis:  Palpitations  ECG reviewed by me, the ED Provider: yes    Patient location:  ED  Comments:      Normal sinus rhythm, rate 93, normal axis, normal intervals, no ST-T wave changes  Compared with previous EKG dated 1/19/21 showing no significant changes  ED Course      A/P: This is a 48 y o  male who presents to the ED for evaluation of vomtiing, palpitations  Likely 2/2 alcohol use  He is worried about withdrawal again as he has severe hx of it  Will get labs, IVF, EKG, PO Challenge  Reevaluate and dispo accordingly  Re-evaluated the patient    He feels back to his baseline  He is tolerating p o  He feels comfortable going home  Offered host services, patient declined states he has appropriate follow-up at home and he just had a bad day    I personally discussed return precautions with this patient  I provided the patient with written discharge instructions and particularly highlighted specific areas of interest to this patient, including but not limited to: medications for symptom managment, follow up recommendations, and return precautions  Patient is in agreement with this plan as outlined above  HEART Risk Score      Most Recent Value   Heart Score Risk Calculator   History  0 Filed at: 02/25/2021 2320   ECG  0 Filed at: 02/25/2021 2320   Age  1 Filed at: 02/25/2021 2320   Risk Factors  1 Filed at: 02/25/2021 2320   Troponin  0 Filed at: 02/25/2021 2320   HEART Score  2 Filed at: 02/25/2021 2320        MDM    Disposition  Final diagnoses:   Alcoholic intoxication without complication (HCC)   Nausea and vomiting     Time reflects when diagnosis was documented in both MDM as applicable and the Disposition within this note     Time User Action Codes Description Comment    2/25/2021 11:18 PM Trigg Ifeoma Add [U83 107] Alcoholic intoxication without complication ([address] )     9/01/7956 11:18 PM Trigg Ifeoma Add [R11 2] Nausea and vomiting       ED Disposition     ED Disposition Condition Date/Time Comment    Discharge Stable u Feb 25, 2021 11:18 PM Luis Louie discharge to home/self care  Follow-up Information     Follow up With Specialties Details Why [address] Surendra Dawson MD Family Medicine Call in 1 day  [address] [place] [address]  Call  As needed [facility] Valente  [phone]          Patient's Medications   Discharge Prescriptions    No medications on file     No discharge procedures on file      PDMP Review     None          ED Provider  Electronically Signed by           Giselle Rizo MD  02/25/21 8821

## 2021-04-02 DIAGNOSIS — N52.2 DRUG-INDUCED ERECTILE DYSFUNCTION: ICD-10-CM

## 2021-04-02 RX ORDER — TADALAFIL 20 MG/1
TABLET ORAL
Qty: 10 TABLET | Refills: 0 | OUTPATIENT
Start: 2021-04-02

## 2021-04-07 NOTE — PROGRESS NOTES
FAMILY MEDICINE PROGRESS NOTE    Date of Service: 21  Primary Care Provider:   Ave Pop MD     Name: Veto Schilder       : 1970       Age:51 y o  Sex: male      MRN: [id number]      Chief Complaint:Follow-up (rehab)     ASSESSMENT and PLAN:  Veto Schilder is a 46 y o  male with:     Problem List Items Addressed This Visit        Other    Substance use disorder - Primary    Alcohol use disorder, severe, in early remission [facility])     He had another relapse last month and went to detox/rehab for several days  He is not taking Disulfram which he states does help him avoid taking alcohol, though he states this is a "plan B " He states he intends to not drink regardless  He has been sober for 26 days  He has been attending [place] AA meetings several nights a week  Discussed importance of engaging in [address], recommend that he see a therapist or counselor  Reviewed importance of routine, healthy habits  Continue with Disulfram           Relevant Medications    disulfiram (ANTABUSE) 250 mg tablet    FLUoxetine (PROzac) 40 MG capsule    Other Relevant Orders    Hepatic function panel    Anxiety     Recommended that he continue Prozac for now as there are a lot of stressful life events with divorce, ect  Will plan to reassess at 3 months  Relevant Medications    FLUoxetine (PROzac) 40 MG capsule    Drug-induced erectile dysfunction    Relevant Medications    tadalafil (CIALIS) 20 MG tablet          SUBJECTIVE:  Veto Schilder is a 46 y o  male who presents today with a chief complaint of Follow-up (rehab)  HPI     Patient presents today for follow-up  He has been in medical detox twice since his last visit  He was in [facility] hospital   He was in detox , he checked out because he does not feel AA, 12-step program is not helpful for him  He went to the ER with acute alcohol intoxication on    He was subsequently admitted to rehab  after 4 day binge following a custody hearing  He is currently taking antabuse, he feels like this is keeping him from drinking  He is taking this every other day due to nausea  He has been doing some [employer]  He is wondering about coming off of Prozac  He states that this interferes with his erection  He feels like his mood is good and his is in a better place than when the medication was started  He reports that he is sleeping well, exercising  Review of Systems   Constitutional: Positive for unexpected weight change (weight loss)  Negative for appetite change  Respiratory: Negative for shortness of breath  Cardiovascular: Negative for chest pain  Psychiatric/Behavioral: Negative for decreased concentration, dysphoric mood, self-injury, sleep disturbance and suicidal ideas  The patient is not nervous/anxious  I have reviewed the patient's Past Medical History      Current Outpatient Medications:     disulfiram (ANTABUSE) 250 mg tablet, Take 1 tablet (250 mg total) by mouth every other day, Disp: 30 tablet, Rfl: 3    Fexofenadine HCl (ALLEGRA ALLERGY PO), Take by mouth as needed , Disp: , Rfl:     FLUoxetine (PROzac) 40 MG capsule, Take 1 capsule (40 mg total) by mouth daily, Disp: 30 capsule, Rfl: 3    melatonin 3 mg, Take 3 tablets (9 mg total) by mouth daily at bedtime as needed (sleep disturbance, insomnia) (Patient taking differently: Take 10 mg by mouth daily ), Disp: 10 tablet, Rfl: 0    multivitamin (THERAGRAN) TABS, Take 1 tablet by mouth daily, Disp: , Rfl:     NON FORMULARY, Take 2 each by mouth as needed De stress balls-ASHWAGANDHA-ginseng and lemon balm , Disp: , Rfl:     propranolol (INDERAL) 20 mg tablet, Take 1 tablet (20 mg total) by mouth 2 (two) times a day, Disp: 60 tablet, Rfl: 5    tadalafil (CIALIS) 20 MG tablet, Take 0 5 tablets (10 mg total) by mouth daily as needed for erectile dysfunction, Disp: 10 tablet, Rfl: 1    thiamine (VITAMIN B1) 100 mg tablet, Take 100 mg by mouth daily, Disp: , Rfl:     OBJECTIVE:  /82   Pulse 72   Temp 97 5 °F (36 4 °C)   Resp 12   Ht 5' 6" (1 676 m)   Wt 61 kg (134 lb 8 oz)   BMI 21 71 kg/m²    BP Readings from Last 3 Encounters:   04/08/21 126/82   02/25/21 123/72   02/17/21 140/90      Wt Readings from Last 3 Encounters:   04/08/21 61 kg (134 lb 8 oz)   02/17/21 62 1 kg (137 lb)   01/19/21 64 kg (141 lb 1 5 oz)      Physical Exam  Constitutional:       General: He is not in acute distress  Appearance: Normal appearance  He is not ill-appearing or toxic-appearing  HENT:      Head: Normocephalic and atraumatic  Right Ear: External ear normal       Left Ear: External ear normal       Nose: Nose normal       Mouth/Throat:      Mouth: Mucous membranes are moist    Eyes:      Extraocular Movements: Extraocular movements intact  Conjunctiva/sclera: Conjunctivae normal    Neck:      Musculoskeletal: Normal range of motion and neck supple  No neck rigidity  Pulmonary:      Effort: Pulmonary effort is normal  No respiratory distress  Skin:     Findings: No erythema or rash  Neurological:      General: No focal deficit present  Mental Status: He is alert and oriented to person, place, and time  Psychiatric:         Mood and Affect: Mood normal          Behavior: Behavior normal               Return in about 3 months (around 7/8/2021) for follow-up   Fernanda Kohli MD    Note: Portions of the record have been created with voice recognition software  Occasional wrong word or "sound a like" substitutions may have occurred due to the inherent limitations of voice recognition software  Read the chart carefully and recognize, using context, where substitutions have occurred

## 2021-04-08 ENCOUNTER — OFFICE VISIT (OUTPATIENT)
Dept: FAMILY MEDICINE CLINIC | Facility: CLINIC | Age: 51
End: 2021-04-08
Payer: COMMERCIAL

## 2021-04-08 VITALS
RESPIRATION RATE: 12 BRPM | BODY MASS INDEX: 21.62 KG/M2 | HEIGHT: 66 IN | WEIGHT: 134.5 LBS | DIASTOLIC BLOOD PRESSURE: 82 MMHG | TEMPERATURE: 97.5 F | HEART RATE: 72 BPM | SYSTOLIC BLOOD PRESSURE: 126 MMHG

## 2021-04-08 DIAGNOSIS — F19.90 SUBSTANCE USE DISORDER: Primary | ICD-10-CM

## 2021-04-08 DIAGNOSIS — F41.9 ANXIETY: ICD-10-CM

## 2021-04-08 DIAGNOSIS — F10.21 ALCOHOL USE DISORDER, SEVERE, IN EARLY REMISSION (HCC): ICD-10-CM

## 2021-04-08 DIAGNOSIS — N52.2 DRUG-INDUCED ERECTILE DYSFUNCTION: ICD-10-CM

## 2021-04-08 PROBLEM — R45.851 SUICIDAL IDEATION: Status: RESOLVED | Noted: 2021-01-19 | Resolved: 2021-04-08

## 2021-04-08 PROCEDURE — 99214 OFFICE O/P EST MOD 30 MIN: CPT | Performed by: FAMILY MEDICINE

## 2021-04-08 PROCEDURE — 3008F BODY MASS INDEX DOCD: CPT | Performed by: FAMILY MEDICINE

## 2021-04-08 RX ORDER — FLUOXETINE HYDROCHLORIDE 40 MG/1
40 CAPSULE ORAL DAILY
Qty: 30 CAPSULE | Refills: 3 | Status: SHIPPED | OUTPATIENT
Start: 2021-04-08 | End: 2021-06-08

## 2021-04-08 RX ORDER — TADALAFIL 20 MG/1
10 TABLET ORAL DAILY PRN
Qty: 10 TABLET | Refills: 1 | Status: SHIPPED | OUTPATIENT
Start: 2021-04-08 | End: 2021-05-20 | Stop reason: SDUPTHER

## 2021-04-08 RX ORDER — DISULFIRAM 250 MG/1
250 TABLET ORAL EVERY OTHER DAY
COMMUNITY
End: 2021-04-08 | Stop reason: SDUPTHER

## 2021-04-08 RX ORDER — DISULFIRAM 250 MG/1
250 TABLET ORAL EVERY OTHER DAY
Qty: 30 TABLET | Refills: 3 | Status: SHIPPED | OUTPATIENT
Start: 2021-04-08 | End: 2021-09-14 | Stop reason: ALTCHOICE

## 2021-04-08 NOTE — ASSESSMENT & PLAN NOTE
He had another relapse last month and went to detox/rehab for several days  He is not taking Disulfram which he states does help him avoid taking alcohol, though he states this is a "plan B " He states he intends to not drink regardless  He has been sober for 26 days  He has been attending [facility] AA meetings several nights a week  Discussed importance of engaging in [address], recommend that he see a therapist or counselor  Reviewed importance of routine, healthy habits   Continue with Disulfram

## 2021-04-08 NOTE — ASSESSMENT & PLAN NOTE
Recommended that he continue Prozac for now as there are a lot of stressful life events with divorce, ect  Will plan to reassess at 3 months

## 2021-04-20 DIAGNOSIS — F41.9 ANXIETY: ICD-10-CM

## 2021-04-20 DIAGNOSIS — N52.2 DRUG-INDUCED ERECTILE DYSFUNCTION: ICD-10-CM

## 2021-04-20 DIAGNOSIS — R00.2 PALPITATIONS: ICD-10-CM

## 2021-04-20 RX ORDER — PROPRANOLOL HYDROCHLORIDE 20 MG/1
20 TABLET ORAL 2 TIMES DAILY
Qty: 60 TABLET | Refills: 5 | Status: SHIPPED | OUTPATIENT
Start: 2021-04-20 | End: 2021-08-23 | Stop reason: SDUPTHER

## 2021-04-20 RX ORDER — TADALAFIL 20 MG/1
10 TABLET ORAL DAILY PRN
Qty: 10 TABLET | Refills: 0 | Status: CANCELLED | OUTPATIENT
Start: 2021-04-20

## 2021-04-20 NOTE — TELEPHONE ENCOUNTER
Cialis filled at office visit, he should have refill available at the [facility]  Refilled propranolol

## 2021-04-21 NOTE — TELEPHONE ENCOUNTER
Left detailed message for patient to check with pharmacy in regards to refill  Told to call back if it was not at pharmacy

## 2021-05-20 ENCOUNTER — PATIENT MESSAGE (OUTPATIENT)
Dept: FAMILY MEDICINE CLINIC | Facility: CLINIC | Age: 51
End: 2021-05-20

## 2021-05-20 DIAGNOSIS — N52.2 DRUG-INDUCED ERECTILE DYSFUNCTION: ICD-10-CM

## 2021-05-20 RX ORDER — TADALAFIL 20 MG/1
10 TABLET ORAL DAILY PRN
Qty: 10 TABLET | Refills: 1 | Status: SHIPPED | OUTPATIENT
Start: 2021-05-20 | End: 2021-07-14 | Stop reason: SDUPTHER

## 2021-06-03 ENCOUNTER — APPOINTMENT (OUTPATIENT)
Dept: LAB | Facility: CLINIC | Age: 51
End: 2021-06-03
Payer: COMMERCIAL

## 2021-06-03 DIAGNOSIS — F10.21 ALCOHOL USE DISORDER, SEVERE, IN EARLY REMISSION (HCC): ICD-10-CM

## 2021-06-03 LAB
ALBUMIN SERPL BCP-MCNC: 4 G/DL (ref 3.5–5)
ALP SERPL-CCNC: 58 U/L (ref 46–116)
ALT SERPL W P-5'-P-CCNC: 28 U/L (ref 12–78)
AST SERPL W P-5'-P-CCNC: 23 U/L (ref 5–45)
BILIRUB DIRECT SERPL-MCNC: 0.2 MG/DL (ref 0–0.2)
BILIRUB SERPL-MCNC: 0.75 MG/DL (ref 0.2–1)
PROT SERPL-MCNC: 7.5 G/DL (ref 6.4–8.2)

## 2021-06-03 PROCEDURE — 36415 COLL VENOUS BLD VENIPUNCTURE: CPT

## 2021-06-03 PROCEDURE — 80076 HEPATIC FUNCTION PANEL: CPT

## 2021-06-07 NOTE — PROGRESS NOTES
FAMILY MEDICINE PROGRESS NOTE    Date of Service: 21  Primary Care Provider:   Fili Ibarra MD     Name: Lorenzo Espinal       : 1970       Age:51 y o  Sex: male      MRN: [id number]        Chief Complaint:Follow-up (2 months)     ASSESSMENT and PLAN:  Lorenzo Espinal is a 46 y o  male with:     Problem List Items Addressed This Visit        Other    Pure hypercholesterolemia    Relevant Orders    Lipid panel    Alcohol use disorder, severe, in early remission ([facility] [address] )    Relevant Medications    FLUoxetine (PROzac) 10 mg capsule    Anxiety - Primary    Relevant Medications    FLUoxetine (PROzac) 10 mg capsule          He would like to taper off of Prozac since he feels his anxiety has improved and is well managed with Propranolol  He also feels like he is at a better place in his life, and has experienced sexual dysfunction with failure to ejaculate on Prozac, explained that Cialis will not help with this symptom  Will reduce dose by 50% for one to two week intervals as tolerated then stop after 10 mg  Reviewed possible adverse effects of stopping medications including withdrawal      Patient continues to drink, does not believe in AA model of abstaining from substance use  Believes he can control his drinking  Advised against alcohol use, especially binge drinking while he is in recovers  SUBJECTIVE:  Lorenzo Espinal is a 46 y o  male who presents today with a chief complaint of Follow-up (2 months)     HPI     Patient presents today for follow-up  He has been in medical detox several times in   He was in [facility]   He was in detox   He went to the ER with acute alcohol intoxication on   He was subsequently admitted to rehab  after 4 day binge following a custody hearing  At his last visit he had abstained from drinking for over 20 days  He has resumed drinking "socially" nightly with his new girlfriend   He did have more than 6 drinks on one day over the weekend  He continues to drink because he likes alcohol  He would like to come off of Prozac today because he feels he is in a better place  He also has failure to ejaculate on the medication which is frustrating for him  His divorce is in the final stages of the 90 days waiting period  He has a new girlfriend  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  He is sleepling well  Review of Systems   Psychiatric/Behavioral: Negative for dysphoric mood and sleep disturbance  The patient is not nervous/anxious  I have reviewed the patient's Past Medical History  Current Outpatient Medications:     Fexofenadine HCl (ALLEGRA ALLERGY PO), Take by mouth as needed , Disp: , Rfl:     multivitamin (THERAGRAN) TABS, Take 1 tablet by mouth daily, Disp: , Rfl:     propranolol (INDERAL) 20 mg tablet, Take 1 tablet (20 mg total) by mouth 2 (two) times a day, Disp: 60 tablet, Rfl: 5    tadalafil (CIALIS) 20 MG tablet, Take 0 5 tablets (10 mg total) by mouth daily as needed for erectile dysfunction, Disp: 10 tablet, Rfl: 1    disulfiram (ANTABUSE) 250 mg tablet, Take 1 tablet (250 mg total) by mouth every other day (Patient not taking: Reported on 6/8/2021), Disp: 30 tablet, Rfl: 3    FLUoxetine (PROzac) 10 mg capsule, Take 2 capsules (20 mg total) by mouth daily for 14 days, THEN 1 capsule (10 mg total) daily for 14 days  , Disp: 42 capsule, Rfl: 0    melatonin 3 mg, Take 3 tablets (9 mg total) by mouth daily at bedtime as needed (sleep disturbance, insomnia) (Patient taking differently: Take 10 mg by mouth daily ), Disp: 10 tablet, Rfl: 0    NON FORMULARY, Take 2 each by mouth as needed De stress balls-ASHWAGANDHA-ginseng and lemon balm , Disp: , Rfl:     thiamine (VITAMIN B1) 100 mg tablet, Take 100 mg by mouth daily, Disp: , Rfl:     OBJECTIVE:  /82   Pulse 76   Temp (!) 96 °F (35 6 °C)   Resp 12   Ht 5' 6" (1 676 m)   Wt 62 4 kg (137 lb 8 oz)   SpO2 98% BMI 22 19 kg/m²    BP Readings from Last 3 Encounters:   06/08/21 124/82   04/08/21 126/82   02/25/21 123/72      Wt Readings from Last 3 Encounters:   06/08/21 62 4 kg (137 lb 8 oz)   04/08/21 61 kg (134 lb 8 oz)   02/17/21 62 1 kg (137 lb)      Physical Exam  Constitutional:       General: He is not in acute distress  Appearance: Normal appearance  He is not ill-appearing or toxic-appearing  HENT:      Head: Normocephalic and atraumatic  Right Ear: External ear normal       Left Ear: External ear normal    Eyes:      Extraocular Movements: Extraocular movements intact  Conjunctiva/sclera: Conjunctivae normal    Neck:      Musculoskeletal: Normal range of motion and neck supple  No neck rigidity  Pulmonary:      Effort: Pulmonary effort is normal  No respiratory distress  Skin:     Findings: No erythema or rash  Neurological:      General: No focal deficit present  Mental Status: He is alert and oriented to person, place, and time  Psychiatric:         Mood and Affect: Mood normal          Behavior: Behavior normal               Return in 3 months (on 9/8/2021) for physical      Liu Zendejas MD    Note: Portions of the record have been created with voice recognition software  Occasional wrong word or "sound a like" substitutions may have occurred due to the inherent limitations of voice recognition software  Read the chart carefully and recognize, using context, where substitutions have occurred

## 2021-06-08 ENCOUNTER — OFFICE VISIT (OUTPATIENT)
Dept: FAMILY MEDICINE CLINIC | Facility: CLINIC | Age: 51
End: 2021-06-08
Payer: COMMERCIAL

## 2021-06-08 VITALS
SYSTOLIC BLOOD PRESSURE: 124 MMHG | OXYGEN SATURATION: 98 % | HEIGHT: 66 IN | WEIGHT: 137.5 LBS | TEMPERATURE: 96 F | HEART RATE: 76 BPM | BODY MASS INDEX: 22.1 KG/M2 | DIASTOLIC BLOOD PRESSURE: 82 MMHG | RESPIRATION RATE: 12 BRPM

## 2021-06-08 DIAGNOSIS — F10.21 ALCOHOL USE DISORDER, SEVERE, IN EARLY REMISSION (HCC): ICD-10-CM

## 2021-06-08 DIAGNOSIS — F41.9 ANXIETY: Primary | ICD-10-CM

## 2021-06-08 DIAGNOSIS — E78.00 PURE HYPERCHOLESTEROLEMIA: ICD-10-CM

## 2021-06-08 PROCEDURE — 3008F BODY MASS INDEX DOCD: CPT | Performed by: FAMILY MEDICINE

## 2021-06-08 PROCEDURE — 99213 OFFICE O/P EST LOW 20 MIN: CPT | Performed by: FAMILY MEDICINE

## 2021-06-08 PROCEDURE — 3725F SCREEN DEPRESSION PERFORMED: CPT | Performed by: FAMILY MEDICINE

## 2021-06-08 RX ORDER — FLUOXETINE 10 MG/1
CAPSULE ORAL
Qty: 42 CAPSULE | Refills: 0 | Status: SHIPPED | OUTPATIENT
Start: 2021-06-08 | End: 2021-09-14 | Stop reason: ALTCHOICE

## 2021-06-08 RX ORDER — TADALAFIL 20 MG/1
10 TABLET ORAL DAILY PRN
Qty: 10 TABLET | Refills: 1 | Status: CANCELLED | OUTPATIENT
Start: 2021-06-08

## 2021-07-14 DIAGNOSIS — N52.2 DRUG-INDUCED ERECTILE DYSFUNCTION: ICD-10-CM

## 2021-07-14 RX ORDER — TADALAFIL 20 MG/1
10 TABLET ORAL DAILY PRN
Qty: 10 TABLET | Refills: 3 | Status: SHIPPED | OUTPATIENT
Start: 2021-07-14 | End: 2021-08-23 | Stop reason: SDUPTHER

## 2021-08-23 DIAGNOSIS — R00.2 PALPITATIONS: ICD-10-CM

## 2021-08-23 DIAGNOSIS — F41.9 ANXIETY: ICD-10-CM

## 2021-08-23 DIAGNOSIS — N52.2 DRUG-INDUCED ERECTILE DYSFUNCTION: ICD-10-CM

## 2021-08-23 RX ORDER — TADALAFIL 20 MG/1
10 TABLET ORAL DAILY PRN
Qty: 10 TABLET | Refills: 0 | Status: SHIPPED | OUTPATIENT
Start: 2021-08-23 | End: 2021-11-18 | Stop reason: SDUPTHER

## 2021-08-23 RX ORDER — PROPRANOLOL HYDROCHLORIDE 20 MG/1
20 TABLET ORAL 2 TIMES DAILY
Qty: 60 TABLET | Refills: 0 | Status: SHIPPED | OUTPATIENT
Start: 2021-08-23 | End: 2021-09-14 | Stop reason: SDUPTHER

## 2021-08-29 ENCOUNTER — AMB VIDEO VISIT (OUTPATIENT)
Dept: OTHER | Facility: HOSPITAL | Age: 51
End: 2021-08-29
Payer: COMMERCIAL

## 2021-08-29 PROCEDURE — 99212 OFFICE O/P EST SF 10 MIN: CPT | Performed by: FAMILY MEDICINE

## 2021-08-29 NOTE — CARE ANYWHERE EVISITS
Visit Summary for Doroteo Abid - Gender: Male - Date of Birth: [id number]  Date: [id number] - Duration: 8 minutes  Patient: Doroteo Abdi  Provider: Layvonne Aschoff    Patient Contact Information  Address  [address] [place] [address]; [address]  [phone]    Visit Topics  poison ivy [Added By: Self - 2021-08-29]    Triage Questions   What is your current physical address in the event of a medical emergency? Answer []  Are you allergic to any medications? Answer []  Are you now or could you be pregnant? Answer []  Do you have any immune system compromise or chronic lung   disease? Answer []  Do you have any vulnerable family members in the home (infant, pregnant, cancer, elderly)? Answer []     Conversation Transcripts  [0A][0A] [Notification] [address] [facility] Staff, will help you prepare for your visit  He is assisting Layvonne Aschoff, Family Physician [0A][ulin Latoya] Angel, and thank you for connecting  While you are waiting for the doctor, are there any questions I   can answer for you about our service? Please contact customer service if you have questions about billing, insurance, or technical issues  Visits work best with a stable WiFi connection, so please make sure you are connected before we   begin [0A][Notification] [address] has left the room  [0A][Notification] You are connected with Layvonne Aschoff, Family Physician [0A][Notification] Anabell Chu is located in [place]  [0A][Notification] Anabell Chu has shared health   history  Amari Kingston  [0A][Notification] Layvonne Aschoff has added a diagnosis/procedure code  [0A][Notification] Layvonne Aschoff has added a diagnosis/procedure code  [0A][Notification] Layvonne Aschoff has added a prescription  [0A]    Diagnosis  Unspecified contact dermatitis due to plants, except food    Procedures  Value: [id number] Code: CPT-4 OL DIG E/M SVC 11-20 MIN    Medications Prescribed    triamcinolone acetonide      Frequency :   Patient Instructions : apply to affected areas 2-3 times daily prn rash   Refills : 0  Instructions to the Pharmacist : Substitutions allowed      Provider Notes  [0A][0A] The caller confirms they are the patient on the registration, and that they are calling from [place][0A]Visit Mode: Video[0A]HPI: Onset 4 days ago of itchy rash to both arms and behind left knee after landscaping  Feels well otherwise  Some   blistering [0A]PMH:Palpitations [0A]Meds:  propanolol[0A]Allergies: NKDA[0A]Smokes cigar [0A]PSHx:  none[0A]Work: [employer] [0A]On video exam the patient appears in no distress  Very small lesion to right area and a red patch behind left  knee  [0A]Assessment: rhus dermatitis [0A]Plan: triamcinolone [0A]See a doctor in person at any time If worsening or new symptoms , or if not improving as expected over 5-7 days   Technu or similar recommended for future exposures  [0A]Discussed the   differential diagnosis, risks/benefits for treatment options and when to seek in person care  All questions were addressed  Patient voiced understanding and agreement with plan  [0A]=================================[0A]1  If you received a   prescription at this visit and you have a question or problem please call [phone] for prescription assistance[0A]2  Please print a copy of this note and send it to your regular doctor, or take it to your next visit so it may be included in your   medical record[0A]3    Please see your primary care provider on an annual basis or more frequently if directed[0A][0A]    Electronically signed by: Jasiel Joyce(NPI [id number])

## 2021-09-12 NOTE — PROGRESS NOTES
ANNUAL PHYSICAL    Date of Service: 21  Primary Care Provider:   Mathews Riedel, MD       Name: Taj Castro       : 1970       Age:51 y o  Sex: male      MRN: [id number]      Chief Complaint:Physical Exam     Assessment and Plan:  46 y o  male exam      1  Health Maintenance  - Colon Cancer Screening: done in 2020, several polyps removed, repeat in   - Prostate Cancer Screen: Reviewed risks/benefits of screening and patient declines at this time  - Labs: ordered previously  - Immunizations: Reviewed  Recommend yearly flu vaccine  2  Other diagnoses addressed today:   Problem List Items Addressed This Visit        Other    Pure hypercholesterolemia     Lab Results   Component Value Date     2020    [facility] 101 (H) 2020    TRIG 99 2020     Repeat lipid panel pending         Alcohol use disorder, severe, in controlled environment, dependence ([facility] [address] )     Patient continues to drink heavily evenings, consuming at least 4 drinks a night  He states that he does not feel like his drinking is a problem, he feels in control of his drinking  He does not wish to cut back  Discussed possible health implications of heavy alcohol use, recommended that patient consume no more than 2 alcoholic drinks in a day  Anxiety     He is no longer on Prozac, he feels like his mood is well controlled  Will continue propranolol 20 mg twice to help with palpitations          Relevant Medications    propranolol (INDERAL) 20 mg tablet      Other Visit Diagnoses     Annual physical exam    -  Primary    Palpitations        Relevant Medications    propranolol (INDERAL) 20 mg tablet           Immunizations and preventive care screenings were discussed with patient today  Appropriate education was printed on patient's after visit summary      Counseling:  · Alcohol/drug use: discussed moderation in alcohol intake, the recommendations for healthy alcohol use, and avoidance of illicit drug use  · Dental Health: discussed importance of regular tooth brushing, flossing, and dental visits  · Injury prevention: discussed safety/seat belts, safety helmets, smoke detectors, carbon dioxide detectors, and smoking near bedding or upholstery  · Exercise: the importance of regular exercise/physical activity was discussed  Recommend exercise 3-5 times per week for at least 30 minutes  RTC 1 year for annual  visit or sooner PRN    Subjective:    Lai Vieira is a 46 y o  male and is here for his comprehensive physical exam      Acute complaints: none  He has gained weight, he has been eating a more well balanced diet  He is back to drinking, at least 4 a night  He will drink more when he had a bad day  He is no longer taking Prozac  He feels like his mood has been stable on this medications  He tried to come off propranolol, but had worsening palpitation  Diet and Physical Activity  · Diet/Nutrition: does follow a well balanced diet  · Exercise: no formal exercise  Active at work  General Health  · Vision: no vision problems and goes for regular eye exams  · Dental: regular dental visits          Histories Updated and Reviewed 9/14/2021:  Patient's Medications   New Prescriptions    No medications on file   Previous Medications    FEXOFENADINE HCL (ALLEGRA ALLERGY PO)    Take by mouth as needed     MULTIVITAMIN (THERAGRAN) TABS    Take 1 tablet by mouth daily    TADALAFIL (CIALIS) 20 MG TABLET    Take 0 5 tablets (10 mg total) by mouth daily as needed for erectile dysfunction    THIAMINE (VITAMIN B1) 100 MG TABLET    Take 100 mg by mouth daily   Modified Medications    Modified Medication Previous Medication    PROPRANOLOL (INDERAL) 20 MG TABLET propranolol (INDERAL) 20 mg tablet       Take 1 tablet (20 mg total) by mouth 2 (two) times a day    Take 1 tablet (20 mg total) by mouth 2 (two) times a day   Discontinued Medications    DISULFIRAM (ANTABUSE) 250 MG TABLET Take 1 tablet (250 mg total) by mouth every other day    FLUOXETINE (PROZAC) 10 MG CAPSULE    Take 2 capsules (20 mg total) by mouth daily for 14 days, THEN 1 capsule (10 mg total) daily for 14 days  MELATONIN 3 MG    Take 3 tablets (9 mg total) by mouth daily at bedtime as needed (sleep disturbance, insomnia)    NON FORMULARY    Take 2 each by mouth as needed De stress balls-ASHWAGANDHA-ginseng and lemon balm      Allergies   Allergen Reactions    Other Allergic Rhinitis     seasonal     Past Medical History:   Diagnosis Date    Alcoholic ([facility] [address] )     Allergic rhinitis     Palpitation      Social History     Socioeconomic History    Marital status: /Civil Union     Spouse name: Not on file    Number of children: Not on file    Years of education: Not on file    Highest education level: Not on file   Occupational History    Not on file   Tobacco Use    Smoking status: Current Some Day Smoker     Types: Cigars    Smokeless tobacco: Never Used    Tobacco comment: cigars 1 daily   Vaping Use    Vaping Use: Never used   Substance and Sexual Activity    Alcohol use: Yes     Comment: relapsed 3/13/21    Drug use: No    Sexual activity: Yes     Partners: Female   Other Topics Concern    Not on file   Social History Narrative    Not on file     Social Determinants of Health     Financial Resource Strain:     Difficulty of Paying Living Expenses:    Food Insecurity:     Worried About Running Out of Food in the Last Year:     Ran Out of Food in the Last Year:    Transportation Needs:     Lack of Transportation (Medical):      Lack of Transportation (Non-Medical):    Physical Activity:     Days of Exercise per Week:     Minutes of Exercise per Session:    Stress:     Feeling of Stress :    Social Connections:     Frequency of Communication with Friends and Family:     Frequency of Social Gatherings with Friends and Family:     Attends [employer] Services:     Active Member of Clubs or Organizations:     Attends Club or Organization Meetings:     Marital Status:    Intimate Partner Violence:     Fear of Current or Ex-Partner:     Emotionally Abused:     Physically Abused:     Sexually Abused:      Immunization History   Administered Date(s) Administered    INFLUENZA 12/24/2018, 11/15/2020    Influenza, injectable, quadrivalent, preservative free 0 5 mL 12/23/2018, 10/23/2019, 11/15/2020    SARS-CoV-2 / COVID-19 mRNA IM (Moderna) 04/01/2021, 04/30/2021    Zoster Vaccine Recombinant 10/08/2020, 12/08/2020       PHQ-9 Depression Screening    PHQ-9:   Frequency of the following problems over the past two weeks:      Little interest or pleasure in doing things: 0 - not at all  Feeling down, depressed, or hopeless: 0 - not at all  PHQ-2 Score: 0         Objective:  /80   Pulse 72   Temp (!) 97 1 °F (36 2 °C)   Resp 16   Ht 5' 6" (1 676 m)   Wt 69 4 kg (153 lb)   BMI 24 69 kg/m²   BP Readings from Last 3 Encounters:   09/14/21 124/80   06/08/21 124/82   04/08/21 126/82      Wt Readings from Last 3 Encounters:   09/14/21 69 4 kg (153 lb)   06/08/21 62 4 kg (137 lb 8 oz)   04/08/21 61 kg (134 lb 8 oz)      Physical Exam  Constitutional:       General: He is not in acute distress  Appearance: Normal appearance  He is not ill-appearing or toxic-appearing  HENT:      Head: Normocephalic and atraumatic  Right Ear: Tympanic membrane and external ear normal       Left Ear: Tympanic membrane and external ear normal    Eyes:      Extraocular Movements: Extraocular movements intact  Conjunctiva/sclera: Conjunctivae normal    Cardiovascular:      Rate and Rhythm: Normal rate and regular rhythm  Pulses: Normal pulses  Heart sounds: Normal heart sounds  No murmur heard  No friction rub  No gallop  Pulmonary:      Effort: Pulmonary effort is normal  No respiratory distress  Breath sounds: Normal breath sounds  Abdominal:      General: Abdomen is flat   There is no distension  Palpations: Abdomen is soft  Tenderness: There is no abdominal tenderness  Musculoskeletal:         General: Normal range of motion  Cervical back: Normal range of motion and neck supple  No rigidity  Right lower leg: No edema  Left lower leg: No edema  Lymphadenopathy:      Cervical: No cervical adenopathy  Skin:     General: Skin is warm and dry  Findings: No erythema or rash  Neurological:      General: No focal deficit present  Mental Status: He is alert and oriented to person, place, and time  Psychiatric:         Mood and Affect: Mood normal          Behavior: Behavior normal          Lab Results   Component Value Date    WBC 10 59 (H) 02/25/2021    HGB 16 3 02/25/2021    HCT 46 9 02/25/2021    MCV 92 02/25/2021     (H) 02/25/2021     Lab Results   Component Value Date    SODIUM 136 02/25/2021    K 4 8 02/25/2021    CL 98 (L) 02/25/2021    CO2 21 02/25/2021    BUN 19 02/25/2021    CREATININE 1 15 02/25/2021    GLUC 110 02/25/2021    CALCIUM 8 3 02/25/2021     Lab Results   Component Value Date    ALT 28 06/03/2021    AST 23 06/03/2021    ALKPHOS 58 06/03/2021     Lab Results   Component Value Date    CHOLESTEROL 230 (H) 07/29/2020     Lab Results   Component Value Date     07/29/2020     Lab Results   Component Value Date    TRIG 99 07/29/2020     No results found for: Leslie  Lab Results   Component Value Date    LDLCALC 101 (H) 07/29/2020         Patient Care Team:  Wicho Rowe MD as PCP - General (Family Medicine)    Wicho Rowe MD    Note: Portions of the record may have been created with voice recognition software  Occasional wrong word or "sound a like" substitutions may have occurred due to the inherent limitations of voice recognition software  Read the chart carefully and recognize, using context, where substitutions have occurred

## 2021-09-14 ENCOUNTER — OFFICE VISIT (OUTPATIENT)
Dept: FAMILY MEDICINE CLINIC | Facility: CLINIC | Age: 51
End: 2021-09-14
Payer: COMMERCIAL

## 2021-09-14 VITALS
DIASTOLIC BLOOD PRESSURE: 80 MMHG | WEIGHT: 153 LBS | BODY MASS INDEX: 24.59 KG/M2 | HEART RATE: 72 BPM | RESPIRATION RATE: 16 BRPM | TEMPERATURE: 97.1 F | SYSTOLIC BLOOD PRESSURE: 124 MMHG | HEIGHT: 66 IN

## 2021-09-14 DIAGNOSIS — R00.2 PALPITATIONS: ICD-10-CM

## 2021-09-14 DIAGNOSIS — E78.00 PURE HYPERCHOLESTEROLEMIA: ICD-10-CM

## 2021-09-14 DIAGNOSIS — F10.20 ALCOHOL USE DISORDER, SEVERE, IN CONTROLLED ENVIRONMENT, DEPENDENCE (HCC): ICD-10-CM

## 2021-09-14 DIAGNOSIS — F41.9 ANXIETY: ICD-10-CM

## 2021-09-14 DIAGNOSIS — Z00.00 ANNUAL PHYSICAL EXAM: Primary | ICD-10-CM

## 2021-09-14 PROCEDURE — 99396 PREV VISIT EST AGE 40-64: CPT | Performed by: FAMILY MEDICINE

## 2021-09-14 PROCEDURE — 3725F SCREEN DEPRESSION PERFORMED: CPT | Performed by: FAMILY MEDICINE

## 2021-09-14 PROCEDURE — 3008F BODY MASS INDEX DOCD: CPT | Performed by: FAMILY MEDICINE

## 2021-09-14 RX ORDER — PROPRANOLOL HYDROCHLORIDE 20 MG/1
20 TABLET ORAL 2 TIMES DAILY
Qty: 180 TABLET | Refills: 3 | Status: SHIPPED | OUTPATIENT
Start: 2021-09-14 | End: 2021-12-15 | Stop reason: SDUPTHER

## 2021-09-14 NOTE — PATIENT INSTRUCTIONS

## 2021-09-14 NOTE — ASSESSMENT & PLAN NOTE
He is no longer on Prozac, he feels like his mood is well controlled   Will continue propranolol 20 mg twice to help with palpitations

## 2021-09-14 NOTE — ASSESSMENT & PLAN NOTE
Patient continues to drink heavily evenings, consuming at least 4 drinks a night  He states that he does not feel like his drinking is a problem, he feels in control of his drinking  He does not wish to cut back  Discussed possible health implications of heavy alcohol use, recommended that patient consume no more than 2 alcoholic drinks in a day

## 2021-09-14 NOTE — ASSESSMENT & PLAN NOTE
Lab Results   Component Value Date     07/29/2020    LDLCALC 101 (H) 07/29/2020    TRIG 99 07/29/2020     Repeat lipid panel pending

## 2021-10-29 ENCOUNTER — VBI (OUTPATIENT)
Dept: ADMINISTRATIVE | Facility: OTHER | Age: 51
End: 2021-10-29

## 2021-11-02 ENCOUNTER — APPOINTMENT (EMERGENCY)
Dept: CT IMAGING | Facility: HOSPITAL | Age: 51
End: 2021-11-02
Payer: COMMERCIAL

## 2021-11-02 ENCOUNTER — HOSPITAL ENCOUNTER (EMERGENCY)
Facility: HOSPITAL | Age: 51
Discharge: HOME | End: 2021-11-02
Attending: EMERGENCY MEDICINE
Payer: COMMERCIAL

## 2021-11-02 VITALS
HEART RATE: 77 BPM | TEMPERATURE: 98.3 F | DIASTOLIC BLOOD PRESSURE: 67 MMHG | SYSTOLIC BLOOD PRESSURE: 115 MMHG | RESPIRATION RATE: 16 BRPM | OXYGEN SATURATION: 98 %

## 2021-11-02 DIAGNOSIS — K29.20 ACUTE ALCOHOLIC GASTRITIS WITHOUT HEMORRHAGE: Primary | ICD-10-CM

## 2021-11-02 DIAGNOSIS — F10.10 ALCOHOL ABUSE: ICD-10-CM

## 2021-11-02 LAB
ALBUMIN SERPL BCP-MCNC: 3.8 G/DL (ref 3.5–5)
ALP SERPL-CCNC: 69 U/L (ref 46–116)
ALT SERPL W P-5'-P-CCNC: 29 U/L (ref 12–78)
ANION GAP SERPL CALCULATED.3IONS-SCNC: 13 MMOL/L (ref 4–13)
AST SERPL W P-5'-P-CCNC: 19 U/L (ref 5–45)
ATRIAL RATE: 70 BPM
BASOPHILS # BLD AUTO: 0.07 THOUSANDS/ΜL (ref 0–0.1)
BASOPHILS NFR BLD AUTO: 1 % (ref 0–1)
BILIRUB SERPL-MCNC: 0.47 MG/DL (ref 0.2–1)
BILIRUB UR QL STRIP: NEGATIVE
BUN SERPL-MCNC: 15 MG/DL (ref 5–25)
CALCIUM SERPL-MCNC: 8.7 MG/DL (ref 8.3–10.1)
CHLORIDE SERPL-SCNC: 102 MMOL/L (ref 100–108)
CLARITY UR: CLEAR
CO2 SERPL-SCNC: 22 MMOL/L (ref 21–32)
COLOR UR: YELLOW
CREAT SERPL-MCNC: 0.97 MG/DL (ref 0.6–1.3)
EOSINOPHIL # BLD AUTO: 0.14 THOUSAND/ΜL (ref 0–0.61)
EOSINOPHIL NFR BLD AUTO: 3 % (ref 0–6)
ERYTHROCYTE [DISTWIDTH] IN BLOOD BY AUTOMATED COUNT: 12.3 % (ref 11.6–15.1)
GFR SERPL CREATININE-BSD FRML MDRD: 90 ML/MIN/1.73SQ M
GLUCOSE SERPL-MCNC: 109 MG/DL (ref 65–140)
GLUCOSE UR STRIP-MCNC: NEGATIVE MG/DL
HCT VFR BLD AUTO: 45.6 % (ref 36.5–49.3)
HGB BLD-MCNC: 16 G/DL (ref 12–17)
HGB UR QL STRIP.AUTO: NEGATIVE
IMM GRANULOCYTES # BLD AUTO: 0.07 THOUSAND/UL (ref 0–0.2)
IMM GRANULOCYTES NFR BLD AUTO: 1 % (ref 0–2)
KETONES UR STRIP-MCNC: NEGATIVE MG/DL
LEUKOCYTE ESTERASE UR QL STRIP: NEGATIVE
LIPASE SERPL-CCNC: 172 U/L (ref 73–393)
LYMPHOCYTES # BLD AUTO: 1.83 THOUSANDS/ΜL (ref 0.6–4.47)
LYMPHOCYTES NFR BLD AUTO: 34 % (ref 14–44)
MCH RBC QN AUTO: 33 PG (ref 26.8–34.3)
MCHC RBC AUTO-ENTMCNC: 35.1 G/DL (ref 31.4–37.4)
MCV RBC AUTO: 94 FL (ref 82–98)
MONOCYTES # BLD AUTO: 0.59 THOUSAND/ΜL (ref 0.17–1.22)
MONOCYTES NFR BLD AUTO: 11 % (ref 4–12)
NEUTROPHILS # BLD AUTO: 2.75 THOUSANDS/ΜL (ref 1.85–7.62)
NEUTS SEG NFR BLD AUTO: 50 % (ref 43–75)
NITRITE UR QL STRIP: NEGATIVE
NRBC BLD AUTO-RTO: 0 /100 WBCS
P AXIS: 40 DEGREES
PH UR STRIP.AUTO: 5 [PH]
PLATELET # BLD AUTO: 244 THOUSANDS/UL (ref 149–390)
PMV BLD AUTO: 8.9 FL (ref 8.9–12.7)
POTASSIUM SERPL-SCNC: 3.8 MMOL/L (ref 3.5–5.3)
PR INTERVAL: 172 MS
PROT SERPL-MCNC: 7.3 G/DL (ref 6.4–8.2)
PROT UR STRIP-MCNC: NEGATIVE MG/DL
QRS AXIS: 32 DEGREES
QRSD INTERVAL: 74 MS
QT INTERVAL: 374 MS
QTC INTERVAL: 403 MS
RBC # BLD AUTO: 4.85 MILLION/UL (ref 3.88–5.62)
SODIUM SERPL-SCNC: 137 MMOL/L (ref 136–145)
SP GR UR STRIP.AUTO: 1.01 (ref 1–1.03)
T WAVE AXIS: 28 DEGREES
TROPONIN I SERPL-MCNC: <0.02 NG/ML
UROBILINOGEN UR QL STRIP.AUTO: 0.2 E.U./DL
VENTRICULAR RATE: 70 BPM
WBC # BLD AUTO: 5.45 THOUSAND/UL (ref 4.31–10.16)

## 2021-11-02 PROCEDURE — 81003 URINALYSIS AUTO W/O SCOPE: CPT | Performed by: EMERGENCY MEDICINE

## 2021-11-02 PROCEDURE — 85025 COMPLETE CBC W/AUTO DIFF WBC: CPT | Performed by: EMERGENCY MEDICINE

## 2021-11-02 PROCEDURE — 96360 HYDRATION IV INFUSION INIT: CPT

## 2021-11-02 PROCEDURE — 93005 ELECTROCARDIOGRAM TRACING: CPT

## 2021-11-02 PROCEDURE — 36415 COLL VENOUS BLD VENIPUNCTURE: CPT

## 2021-11-02 PROCEDURE — 99284 EMERGENCY DEPT VISIT MOD MDM: CPT

## 2021-11-02 PROCEDURE — 80053 COMPREHEN METABOLIC PANEL: CPT | Performed by: EMERGENCY MEDICINE

## 2021-11-02 PROCEDURE — 74177 CT ABD & PELVIS W/CONTRAST: CPT

## 2021-11-02 PROCEDURE — 93010 ELECTROCARDIOGRAM REPORT: CPT | Performed by: INTERNAL MEDICINE

## 2021-11-02 PROCEDURE — G1004 CDSM NDSC: HCPCS

## 2021-11-02 PROCEDURE — 83690 ASSAY OF LIPASE: CPT | Performed by: EMERGENCY MEDICINE

## 2021-11-02 PROCEDURE — 99284 EMERGENCY DEPT VISIT MOD MDM: CPT | Performed by: PHYSICIAN ASSISTANT

## 2021-11-02 PROCEDURE — 84484 ASSAY OF TROPONIN QUANT: CPT | Performed by: PHYSICIAN ASSISTANT

## 2021-11-02 RX ORDER — SUCRALFATE 1 G/1
1 TABLET ORAL ONCE
Status: COMPLETED | OUTPATIENT
Start: 2021-11-02 | End: 2021-11-02

## 2021-11-02 RX ORDER — SUCRALFATE 1 G/1
1 TABLET ORAL 4 TIMES DAILY
Qty: 30 TABLET | Refills: 0 | Status: SHIPPED | OUTPATIENT
Start: 2021-11-02 | End: 2022-02-08 | Stop reason: ALTCHOICE

## 2021-11-02 RX ORDER — OMEPRAZOLE 20 MG/1
20 CAPSULE, DELAYED RELEASE ORAL DAILY
Qty: 14 CAPSULE | Refills: 0 | Status: SHIPPED | OUTPATIENT
Start: 2021-11-02 | End: 2022-03-02

## 2021-11-02 RX ADMIN — IOHEXOL 85 ML: 350 INJECTION, SOLUTION INTRAVENOUS at 06:47

## 2021-11-02 RX ADMIN — SUCRALFATE 1 G: 1 TABLET ORAL at 04:12

## 2021-11-02 RX ADMIN — SODIUM CHLORIDE 1000 ML: 0.9 INJECTION, SOLUTION INTRAVENOUS at 04:08

## 2021-11-02 RX ADMIN — IOHEXOL 50 ML: 240 INJECTION, SOLUTION INTRATHECAL; INTRAVASCULAR; INTRAVENOUS; ORAL at 05:12

## 2021-11-18 DIAGNOSIS — N52.2 DRUG-INDUCED ERECTILE DYSFUNCTION: ICD-10-CM

## 2021-11-18 RX ORDER — TADALAFIL 20 MG/1
10 TABLET ORAL DAILY PRN
Qty: 10 TABLET | Refills: 0 | Status: SHIPPED | OUTPATIENT
Start: 2021-11-18 | End: 2021-12-15 | Stop reason: SDUPTHER

## 2021-12-15 DIAGNOSIS — N52.2 DRUG-INDUCED ERECTILE DYSFUNCTION: ICD-10-CM

## 2021-12-15 DIAGNOSIS — F41.9 ANXIETY: ICD-10-CM

## 2021-12-15 DIAGNOSIS — R00.2 PALPITATIONS: ICD-10-CM

## 2021-12-16 RX ORDER — PROPRANOLOL HYDROCHLORIDE 20 MG/1
20 TABLET ORAL 2 TIMES DAILY
Qty: 180 TABLET | Refills: 0 | Status: SHIPPED | OUTPATIENT
Start: 2021-12-16 | End: 2022-04-09 | Stop reason: SDUPTHER

## 2021-12-16 RX ORDER — TADALAFIL 20 MG/1
10 TABLET ORAL DAILY PRN
Qty: 10 TABLET | Refills: 0 | Status: SHIPPED | OUTPATIENT
Start: 2021-12-16 | End: 2022-02-08 | Stop reason: SDUPTHER

## 2022-01-07 ENCOUNTER — OFFICE VISIT (OUTPATIENT)
Dept: FAMILY MEDICINE CLINIC | Facility: CLINIC | Age: 52
End: 2022-01-07
Payer: COMMERCIAL

## 2022-01-07 ENCOUNTER — TELEPHONE (OUTPATIENT)
Dept: FAMILY MEDICINE CLINIC | Facility: CLINIC | Age: 52
End: 2022-01-07

## 2022-01-07 VITALS
WEIGHT: 161 LBS | OXYGEN SATURATION: 98 % | RESPIRATION RATE: 16 BRPM | HEIGHT: 66 IN | TEMPERATURE: 97.5 F | SYSTOLIC BLOOD PRESSURE: 124 MMHG | DIASTOLIC BLOOD PRESSURE: 84 MMHG | HEART RATE: 98 BPM | BODY MASS INDEX: 25.88 KG/M2

## 2022-01-07 DIAGNOSIS — F10.20 ALCOHOL USE DISORDER, SEVERE, IN CONTROLLED ENVIRONMENT, DEPENDENCE (HCC): Primary | ICD-10-CM

## 2022-01-07 DIAGNOSIS — R03.0 ELEVATED BLOOD PRESSURE READING IN OFFICE WITHOUT DIAGNOSIS OF HYPERTENSION: ICD-10-CM

## 2022-01-07 DIAGNOSIS — F41.9 ANXIETY: ICD-10-CM

## 2022-01-07 PROCEDURE — 99214 OFFICE O/P EST MOD 30 MIN: CPT | Performed by: PHYSICIAN ASSISTANT

## 2022-01-07 RX ORDER — DISULFIRAM 250 MG/1
250 TABLET ORAL DAILY
Qty: 30 TABLET | Refills: 1 | Status: SHIPPED | OUTPATIENT
Start: 2022-01-07

## 2022-01-07 RX ORDER — CHLORDIAZEPOXIDE HYDROCHLORIDE 25 MG/1
25 CAPSULE, GELATIN COATED ORAL 3 TIMES DAILY PRN
Qty: 9 CAPSULE | Refills: 0 | Status: SHIPPED | OUTPATIENT
Start: 2022-01-07 | End: 2022-02-08 | Stop reason: ALTCHOICE

## 2022-01-07 NOTE — PROGRESS NOTES
Assessment/Plan:    Alcohol use disorder, severe, in controlled environment, dependence ([facility] [address] )  Currently motivated for total cessation of alcohol  Pt unwilling to return to AA    Pt is in supportive setting with girlfriend and family on board  2 prior rehab visits in the past year    Pt will restart antabuse in 3 days    Ordered 3 days of Librium to manage withdrawal symptoms  Pt and girlfriend verbalized understanding of risk  Directed to FU in 3-4 days  Anxiety  Currently taking Propranolol 20 mg  Previously on Prozac on 10 mg, d/c due to sexual side effect  Discussed starting on Wellbutrin  mg  Will revisit in 4 days    Elevated blood pressure reading in office without diagnosis of hypertension  BP Readings from Last 3 Encounters:   01/07/22 124/84   11/02/21 115/67   09/14/21 124/80   Currently managed on propranolol 20 mg    Directed to go to ED with any neurologic symptoms, CP or palpitations  Subjective:    Patient ID: Sheryl West is a 46 y o  male  Pt is presenting today with Yang Adams, Girlfriend for Alcohol withdrawal for the last week  He has been trying to self discontinue but is unable to do it on his own  Over the week he has been trying to wean self off alcohol  He states he has a "Physical NEED for alcohol "    States he has been moderate drinking since April and steadily drinking more  Knows "I need to stop drinking "    He develops tremors in arms after 12 hours after d/c alcohol  Last drink last night a 9pm   Has antabuse at home and previously used  Does not like AA "due to Scientologist spin "    Has been in rehab 2 times in the past year  "Social drinker" since age 12  Work - [employer]    Uncle has history of alcohol abuse  Alcohol Problem  Pertinent negatives include no self-injury  Pertinent negatives include no fever or nausea       The following portions of the patient's history were reviewed and updated as appropriate: allergies, current medications, past family history, past medical history, past social history, past surgical history and problem list     Review of Systems   Constitutional: Negative for activity change, fatigue, fever and unexpected weight change  Respiratory: Negative for cough, shortness of breath and wheezing  Cardiovascular: Negative for chest pain, palpitations and leg swelling  Gastrointestinal: Negative for constipation, diarrhea and nausea  Musculoskeletal: Negative for back pain, neck pain and neck stiffness  Neurological: Positive for tremors  Psychiatric/Behavioral: Positive for sleep disturbance  Negative for dysphoric mood, self-injury and suicidal ideas  The patient is nervous/anxious  Objective:  /84   Pulse 98   Temp 97 5 °F (36 4 °C)   Resp 16   Ht 5' 6" (1 676 m)   Wt 73 kg (161 lb)   SpO2 98%   BMI 25 99 kg/m²      Physical Exam  Constitutional:       General: He is not in acute distress  Appearance: He is well-developed  HENT:      Head: Normocephalic and atraumatic  Pulmonary:      Effort: Pulmonary effort is normal  No respiratory distress  Breath sounds: No wheezing  Neurological:      Mental Status: He is alert  Psychiatric:         Attention and Perception: Attention normal          Mood and Affect: Mood is anxious and elated  Behavior: Behavior normal          Thought Content:  Thought content normal

## 2022-01-07 NOTE — ASSESSMENT & PLAN NOTE
Currently taking Propranolol 20 mg  Previously on Prozac on 10 mg, d/c due to sexual side effect  Discussed starting on Wellbutrin  mg   Will revisit in 4 days

## 2022-01-07 NOTE — ASSESSMENT & PLAN NOTE
BP Readings from Last 3 Encounters:   01/07/22 124/84   11/02/21 115/67   09/14/21 124/80   Currently managed on propranolol 20 mg

## 2022-01-07 NOTE — TELEPHONE ENCOUNTER
Patients wife called today    she is waiting for the librium to be called into pharmacy    she also said that Vinay Jain is planning on drinking this afternoon and he was also drinking this morning and is he it able to start med's today or does he have to wait until he has not alcohol in system

## 2022-01-07 NOTE — ASSESSMENT & PLAN NOTE
Currently motivated for total cessation of alcohol  Pt unwilling to return to AA    Pt is in supportive setting with girlfriend and family on board  2 prior rehab visits in the past year    Pt will restart antabuse in 3 days    Ordered 3 days of Librium to manage withdrawal symptoms  Pt and girlfriend verbalized understanding of risk  Directed to FU in 3-4 days

## 2022-01-11 ENCOUNTER — OFFICE VISIT (OUTPATIENT)
Dept: FAMILY MEDICINE CLINIC | Facility: CLINIC | Age: 52
End: 2022-01-11
Payer: COMMERCIAL

## 2022-01-11 VITALS
SYSTOLIC BLOOD PRESSURE: 146 MMHG | HEIGHT: 66 IN | BODY MASS INDEX: 26.36 KG/M2 | TEMPERATURE: 98.3 F | OXYGEN SATURATION: 98 % | RESPIRATION RATE: 18 BRPM | HEART RATE: 88 BPM | WEIGHT: 164 LBS | DIASTOLIC BLOOD PRESSURE: 100 MMHG

## 2022-01-11 DIAGNOSIS — F33.3 MAJOR DEPRESSIVE DISORDER, RECURRENT, SEVERE WITH PSYCHOTIC SYMPTOMS (HCC): ICD-10-CM

## 2022-01-11 DIAGNOSIS — F41.9 ANXIETY: Primary | ICD-10-CM

## 2022-01-11 DIAGNOSIS — F10.21 ALCOHOL USE DISORDER, SEVERE, IN EARLY REMISSION, DEPENDENCE (HCC): ICD-10-CM

## 2022-01-11 PROBLEM — F10.90 ALCOHOL USE: Status: RESOLVED | Noted: 2019-03-08 | Resolved: 2022-01-11

## 2022-01-11 PROBLEM — Z72.89 ALCOHOL USE: Status: RESOLVED | Noted: 2019-03-08 | Resolved: 2022-01-11

## 2022-01-11 PROBLEM — Z78.9 ALCOHOL USE: Status: RESOLVED | Noted: 2019-03-08 | Resolved: 2022-01-11

## 2022-01-11 PROCEDURE — 3725F SCREEN DEPRESSION PERFORMED: CPT | Performed by: PHYSICIAN ASSISTANT

## 2022-01-11 PROCEDURE — 99213 OFFICE O/P EST LOW 20 MIN: CPT | Performed by: PHYSICIAN ASSISTANT

## 2022-01-11 PROCEDURE — 3008F BODY MASS INDEX DOCD: CPT | Performed by: PHYSICIAN ASSISTANT

## 2022-01-11 RX ORDER — BUPROPION HYDROCHLORIDE 150 MG/1
150 TABLET ORAL EVERY MORNING
Qty: 90 TABLET | Refills: 0 | Status: SHIPPED | OUTPATIENT
Start: 2022-01-11 | End: 2022-02-08

## 2022-01-11 NOTE — ASSESSMENT & PLAN NOTE
Currently motivated, in supportive setting  Finished 3 day course of Librium    Pt will start on Antabuse today, about 92 hours since last alcohol intake   Pt verbalized understanding of significant impact of medications    FU in 4 weeks

## 2022-01-11 NOTE — PROGRESS NOTES
Assessment/Plan:    Anxiety  Sexual adverse effects on Prozac 40 mg  - Ordered Wellbutrin  mg  Discussed NATA    Alcohol use disorder, severe, in early remission, dependence ([address] )  Currently motivated, in supportive setting  Finished 3 day course of Librium    Pt will start on Antabuse today, about 92 hours since last alcohol intake  Pt verbalized understanding of significant impact of medications    FU in 4 weeks    Discussed elevated blood pressure reading today  Pt will continue with at home BP checks  FU in 1 month     Diagnoses and all orders for this visit:    Anxiety  -     buPROPion (Wellbutrin XL) 150 mg 24 hr tablet; Take 1 tablet (150 mg total) by mouth every morning    Alcohol use disorder, severe, in early remission, dependence (HCC)    Major depressive disorder, recurrent, severe with psychotic symptoms (HCC)          Subjective:    Patient ID: Ailyn Garzon is a 46 y o  male  Pt is presenting today for 4 day FU of alcohol withdrawal  He finished his 3 days of Librium last night which greatly improved his chemical withdrawal      Last alcohol intake was 4 days ago  He continues to take propralol  He has been checking his BP at home which has been 140s/80s  The following portions of the patient's history were reviewed and updated as appropriate: allergies, current medications and problem list     Review of Systems   Constitutional: Negative for activity change, fatigue, fever and unexpected weight change  HENT: Negative for rhinorrhea and sore throat  Respiratory: Negative for cough, shortness of breath and wheezing  Cardiovascular: Negative for chest pain, palpitations and leg swelling  Gastrointestinal: Negative for constipation, diarrhea, nausea and vomiting  Musculoskeletal: Negative for back pain, neck pain and neck stiffness  Skin: Negative for rash  Psychiatric/Behavioral: The patient is nervous/anxious            Objective:  /100 (BP Location: Left arm, Patient Position: Sitting, Cuff Size: Standard)   Pulse 88   Temp 98 3 °F (36 8 °C)   Resp 18   Ht 5' 6" (1 676 m)   Wt 74 4 kg (164 lb)   SpO2 98%   BMI 26 47 kg/m²      Physical Exam  Vitals reviewed  Constitutional:       General: He is not in acute distress  Appearance: He is well-developed  He is not diaphoretic  HENT:      Head: Normocephalic and atraumatic  Eyes:      Pupils: Pupils are equal, round, and reactive to light  Cardiovascular:      Rate and Rhythm: Normal rate and regular rhythm  Heart sounds: Normal heart sounds  No murmur heard  No friction rub  No gallop  Pulmonary:      Effort: Pulmonary effort is normal  No respiratory distress  Breath sounds: Normal breath sounds  No wheezing  Skin:     General: Skin is warm and dry  Neurological:      Mental Status: He is alert and oriented to person, place, and time  Psychiatric:         Behavior: Behavior normal          Thought Content:  Thought content normal

## 2022-02-07 NOTE — ASSESSMENT & PLAN NOTE
BP Readings from Last 3 Encounters:   02/08/22 112/78   01/11/22 146/100   01/07/22 124/84     Likely due to alcohol withdrawal previously, blood pressure has improved today

## 2022-02-07 NOTE — PROGRESS NOTES
FAMILY MEDICINE PROGRESS NOTE    Date of Service: 22  Primary Care Provider:   Chinedu Woo MD       Name: Russ Márquez       : 1970       Age:51 y o  Sex: male      MRN: [id number]      Chief Complaint:Follow-up (alcoholism)       ASSESSMENT and PLAN:  Russ Márquez is a 46 y o  male with:     Problem List Items Addressed This Visit        Digestive    Functional dyspepsia     Recommended switching from daily PPI to H2 blocker, provided patient with taper instructions  Recommended lifestyle and diet modifications  Other    Pure hypercholesterolemia    Substance use disorder    Alcohol use disorder, severe, in early remission, dependence ([address] )     He has not had a drink since early January  He has decided not to drink any longer, though is no in any recovery program  Recommended that he consider a recovery program  Will continue Wellbutrin as this may be cutting down on cravings  Relevant Medications    buPROPion (Wellbutrin XL) 150 mg 24 hr tablet    Anxiety     Mood symptoms are overall stable, will continue current management with Wellbutrin and propranolol          Relevant Medications    buPROPion (Wellbutrin XL) 150 mg 24 hr tablet    Erectile dysfunction due to diseases classified elsewhere     Patient is capable of having spontaneous erections, though has inconsistent erections at times of intimacy, discussed this is more psychologic versus organic or vasculogenic and Cialis may not help with these symptoms  Relevant Medications    tadalafil (CIALIS) 20 MG tablet    Elevated blood pressure reading in office without diagnosis of hypertension - Primary     BP Readings from Last 3 Encounters:   22 112/78   22 146/100   22 124/84     Likely due to alcohol withdrawal previously, blood pressure has improved today            Relevant Orders    Comprehensive metabolic panel    CBC and differential    Reactive depression    Relevant Medications buPROPion (Wellbutrin XL) 150 mg 24 hr tablet          SUBJECTIVE:  Maile Graham is a 46 y o  male who presents today with a chief complaint of Follow-up (alcoholism)  HPI     Patient presents today for follow-up  He was last seen in by myself in September for physical  He had continued to drink heavily at that time, despite having been hospitalized and attended alcohol detox/rehab multiple times in the last year  He was seen in the ER on November 2 for acute alcohol intoxication and epigastric abdominal pain  Symptoms felt to be due to alcoholic gastritis  More recently he was seen here January 7 with alcohol withdrawal  He had been trying to cut back, but was experiencing tremors within 12 hours of discontinuing alcohol use  He was started on librium for alcohol withdrawal  In addition he was given prescription for antabuse  He was seen for follow-up January 11, he completed 3 days of librium  He was instructed to start antabuse at that time since he had not had a drink since the night before his previous visit (Last drink 9pm on January 6)  He was also started on Wellbutrin for anxiety and continued on Propranolol  Today he reports that he has not had a drink since prior to his last visit  He is no longer on antabuse, he was having a lot of nausea  His new live in girlfriend has been supporting him  He reports that he has been out with friends and no had alcohol  He reports that he has not had cravings  His mood has been good  Review of Systems   Constitutional: Negative for appetite change  Respiratory: Negative for shortness of breath  Cardiovascular: Negative for chest pain and palpitations  Gastrointestinal: Negative for constipation and diarrhea  Reflux   Psychiatric/Behavioral: Negative for agitation, decreased concentration, dysphoric mood, self-injury, sleep disturbance and suicidal ideas  The patient is not nervous/anxious        I have reviewed the patient's Past Medical History  Current Outpatient Medications:     buPROPion (Wellbutrin XL) 150 mg 24 hr tablet, Take 1 tablet (150 mg total) by mouth every morning, Disp: 90 tablet, Rfl: 1    Fexofenadine HCl (ALLEGRA ALLERGY PO), Take by mouth as needed , Disp: , Rfl:     multivitamin (THERAGRAN) TABS, Take 1 tablet by mouth daily, Disp: , Rfl:     omeprazole (PriLOSEC) 20 mg delayed release capsule, Take 1 capsule (20 mg total) by mouth daily for 14 days, Disp: 14 capsule, Rfl: 0    propranolol (INDERAL) 20 mg tablet, Take 1 tablet (20 mg total) by mouth 2 (two) times a day, Disp: 180 tablet, Rfl: 0    tadalafil (CIALIS) 20 MG tablet, Take 0 5 tablets (10 mg total) by mouth daily as needed for erectile dysfunction, Disp: 10 tablet, Rfl: 0    disulfiram (ANTABUSE) 250 mg tablet, Take 1 tablet (250 mg total) by mouth daily (Patient not taking: Reported on 2/8/2022 ), Disp: 30 tablet, Rfl: 1    OBJECTIVE:  /78   Pulse 72   Temp (!) 96 3 °F (35 7 °C)   Resp 16   Ht 5' 6" (1 676 m)   Wt 73 9 kg (163 lb)   BMI 26 31 kg/m²    BP Readings from Last 3 Encounters:   02/08/22 112/78   01/11/22 146/100   01/07/22 124/84      Wt Readings from Last 3 Encounters:   02/08/22 73 9 kg (163 lb)   01/11/22 74 4 kg (164 lb)   01/07/22 73 kg (161 lb)      Physical Exam  Constitutional:       General: He is not in acute distress  Appearance: Normal appearance  He is not ill-appearing or toxic-appearing  HENT:      Head: Normocephalic and atraumatic  Nose: Nose normal       Mouth/Throat:      Mouth: Mucous membranes are moist  No oral lesions  Dentition: Normal dentition  No gum lesions  Tongue: No lesions  Palate: No mass and lesions  Pharynx: Oropharynx is clear  Tonsils: No tonsillar exudate or tonsillar abscesses  Eyes:      Extraocular Movements: Extraocular movements intact  Conjunctiva/sclera: Conjunctivae normal    Cardiovascular:      Rate and Rhythm: Normal rate and regular rhythm  Pulses: Normal pulses  Heart sounds: Normal heart sounds  Pulmonary:      Effort: Pulmonary effort is normal  No respiratory distress  Breath sounds: Normal breath sounds  No stridor  No wheezing or rales  Musculoskeletal:      Cervical back: Normal range of motion and neck supple  No rigidity  Skin:     Findings: No erythema or rash  Neurological:      General: No focal deficit present  Mental Status: He is alert and oriented to person, place, and time  Psychiatric:         Mood and Affect: Mood normal          Behavior: Behavior normal          Lab Results   Component Value Date    WBC 5 45 11/02/2021    HGB 16 0 11/02/2021    HCT 45 6 11/02/2021    MCV 94 11/02/2021     11/02/2021     Lab Results   Component Value Date    SODIUM 137 11/02/2021    K 3 8 11/02/2021     11/02/2021    CO2 22 11/02/2021    BUN 15 11/02/2021    CREATININE 0 97 11/02/2021    GLUC 109 11/02/2021    CALCIUM 8 7 11/02/2021     Lab Results   Component Value Date    ALT 29 11/02/2021    AST 19 11/02/2021    ALKPHOS 69 11/02/2021       BMI Counseling: Body mass index is 26 31 kg/m²  The BMI is above normal  Nutrition recommendations include decreasing portion sizes, encouraging healthy choices of fruits and vegetables, consuming healthier snacks, limiting drinks that contain sugar, moderation in carbohydrate intake and reducing intake of saturated and trans fat  Exercise recommendations include moderate physical activity 150 minutes/week  Rationale for BMI follow-up plan is due to patient being overweight or obese  Return for Annual physical, Next scheduled follow up  Bay Aragon MD    Note: Portions of the record have been created with voice recognition software  Occasional wrong word or "sound a like" substitutions may have occurred due to the inherent limitations of voice recognition software   Read the chart carefully and recognize, using context, where substitutions have occurred

## 2022-02-08 ENCOUNTER — OFFICE VISIT (OUTPATIENT)
Dept: FAMILY MEDICINE CLINIC | Facility: CLINIC | Age: 52
End: 2022-02-08
Payer: COMMERCIAL

## 2022-02-08 VITALS
DIASTOLIC BLOOD PRESSURE: 78 MMHG | SYSTOLIC BLOOD PRESSURE: 112 MMHG | HEIGHT: 66 IN | RESPIRATION RATE: 16 BRPM | WEIGHT: 163 LBS | HEART RATE: 72 BPM | TEMPERATURE: 96.3 F | BODY MASS INDEX: 26.2 KG/M2

## 2022-02-08 DIAGNOSIS — E78.00 PURE HYPERCHOLESTEROLEMIA: ICD-10-CM

## 2022-02-08 DIAGNOSIS — F10.21 ALCOHOL USE DISORDER, SEVERE, IN EARLY REMISSION, DEPENDENCE (HCC): ICD-10-CM

## 2022-02-08 DIAGNOSIS — K30 FUNCTIONAL DYSPEPSIA: ICD-10-CM

## 2022-02-08 DIAGNOSIS — R03.0 ELEVATED BLOOD PRESSURE READING IN OFFICE WITHOUT DIAGNOSIS OF HYPERTENSION: Primary | ICD-10-CM

## 2022-02-08 DIAGNOSIS — N52.1 ERECTILE DYSFUNCTION DUE TO DISEASES CLASSIFIED ELSEWHERE: ICD-10-CM

## 2022-02-08 DIAGNOSIS — F19.90 SUBSTANCE USE DISORDER: ICD-10-CM

## 2022-02-08 DIAGNOSIS — F41.9 ANXIETY: ICD-10-CM

## 2022-02-08 DIAGNOSIS — N52.2 DRUG-INDUCED ERECTILE DYSFUNCTION: ICD-10-CM

## 2022-02-08 DIAGNOSIS — F32.9 REACTIVE DEPRESSION: ICD-10-CM

## 2022-02-08 PROCEDURE — 99214 OFFICE O/P EST MOD 30 MIN: CPT | Performed by: FAMILY MEDICINE

## 2022-02-08 PROCEDURE — 3008F BODY MASS INDEX DOCD: CPT | Performed by: FAMILY MEDICINE

## 2022-02-08 RX ORDER — TADALAFIL 20 MG/1
10 TABLET ORAL DAILY PRN
Qty: 10 TABLET | Refills: 0 | Status: SHIPPED | OUTPATIENT
Start: 2022-02-08 | End: 2022-04-09 | Stop reason: SDUPTHER

## 2022-02-08 RX ORDER — BUPROPION HYDROCHLORIDE 150 MG/1
150 TABLET ORAL EVERY MORNING
Qty: 90 TABLET | Refills: 1 | Status: SHIPPED | OUTPATIENT
Start: 2022-02-08 | End: 2022-05-18 | Stop reason: SDUPTHER

## 2022-02-08 NOTE — ASSESSMENT & PLAN NOTE
Recommended switching from daily PPI to H2 blocker, provided patient with taper instructions  Recommended lifestyle and diet modifications

## 2022-02-08 NOTE — PATIENT INSTRUCTIONS
Start taking daily Pepcid 20 mg and take omeprazole every other day, in addition to the Pepcid, daily for 7 days then stop omeprazole  You can then continue Pepcid daily as needed for symptoms of reflux

## 2022-02-08 NOTE — ASSESSMENT & PLAN NOTE
He has not had a drink since early January  He has decided not to drink any longer, though is no in any recovery program  Recommended that he consider a recovery program  Will continue Wellbutrin as this may be cutting down on cravings

## 2022-02-08 NOTE — ASSESSMENT & PLAN NOTE
Patient is capable of having spontaneous erections, though has inconsistent erections at times of intimacy, discussed this is more psychologic versus organic or vasculogenic and Cialis may not help with these symptoms

## 2022-02-28 ENCOUNTER — TELEPHONE (OUTPATIENT)
Dept: FAMILY MEDICINE CLINIC | Facility: CLINIC | Age: 52
End: 2022-02-28

## 2022-02-28 NOTE — TELEPHONE ENCOUNTER
Called patient, left messaged  Need more information regarding when patient started drinking again, how much, and when his last drink was  Messaged asked patient to call in the morning to schedule a visit to discuss next steps

## 2022-02-28 NOTE — TELEPHONE ENCOUNTER
Patient's sister called  Patient is going through alcohol withdrawal & experiencing tremendous pain  Sister is not listed on consent   Patient was referred to go to ER while family wait to hear back from rehab facility per [facility]

## 2022-02-28 NOTE — TELEPHONE ENCOUNTER
Patient is calling from the ER because he slipped again with drinking and he is being discharged today  He would like to know if you can prescribe that medication to help him " dry out" for the next 24 hours that you had discussed with him before   He is willing to come in for an appointment but is mostly concerned about withdrawal  Patient said he is very sorry

## 2022-03-01 NOTE — PROGRESS NOTES
FAMILY MEDICINE PROGRESS NOTE    Date of Service: 22  Primary Care Provider:   Lidya Burgess MD       Name: Shannan Matthews       : 1970       Age:51 y o  Sex: male      MRN: [id number]      Chief Complaint:Follow-up (ER)       ASSESSMENT and PLAN:  Shannan Matthews is a 46 y o  male with:     Problem List Items Addressed This Visit        Other    Alcohol use disorder, severe, in early remission, dependence ([facility] [address] ) - Primary    Anxiety        He had not had a drink in over 48 hours, no signs or symptoms of withdrawal  No indication for treatment  Continue with AA, avoidance of alcohol  Continue Wellbutrin, resume antabuse, hydroxyzine  SUBJECTIVE:  Shannan Matthews is a 46 y o  male who presents today with a chief complaint of Follow-up (ER)  HPI     Patient presents for follow-up  He was seen in the ER on Monday morning at [facility] with alcohol intoxication  He started drinking again last week  He was last seen in the office  for follow-up of alcohol withdrawal/abuse  He was in the office  with alcohol withdrawal  He had been trying to cut back but had been experiencing tremors within 12 hours of his last drink  He was given prescription for Librium for 3 days which he completed  He had been sober until he went on vacation and started drinking again last week  It only took a few days until he could not control his drinking  He has not had a drink since Monday, he reports he has not had symptoms of withdrawal such as tremors, palpitations, nausea  He does feel anxious  He went to AA yesterday and this morning  He tells me he is committed to his sobriety and plans to continue going to [facility]  Review of Systems   Constitutional: Negative for diaphoresis  Eyes: Negative for visual disturbance  Cardiovascular: Negative for chest pain, palpitations and leg swelling  Neurological: Negative for tremors and headaches     Psychiatric/Behavioral: Negative for dysphoric mood and hallucinations  The patient is nervous/anxious  I have reviewed the patient's Past Medical History  Current Outpatient Medications:     buPROPion (Wellbutrin XL) 150 mg 24 hr tablet, Take 1 tablet (150 mg total) by mouth every morning, Disp: 90 tablet, Rfl: 1    disulfiram (ANTABUSE) 250 mg tablet, Take 1 tablet (250 mg total) by mouth daily, Disp: 30 tablet, Rfl: 1    Fexofenadine HCl (ALLEGRA ALLERGY PO), Take by mouth as needed , Disp: , Rfl:     multivitamin (THERAGRAN) TABS, Take 1 tablet by mouth daily, Disp: , Rfl:     omeprazole (PriLOSEC) 20 mg delayed release capsule, Take 1 capsule (20 mg total) by mouth daily for 14 days, Disp: 14 capsule, Rfl: 0    propranolol (INDERAL) 20 mg tablet, Take 1 tablet (20 mg total) by mouth 2 (two) times a day, Disp: 180 tablet, Rfl: 0    tadalafil (CIALIS) 20 MG tablet, Take 0 5 tablets (10 mg total) by mouth daily as needed for erectile dysfunction, Disp: 10 tablet, Rfl: 0    OBJECTIVE:  /84   Pulse 88   Temp (!) 95 8 °F (35 4 °C)   Resp 16   Ht 5' 6" (1 676 m)   Wt 72 6 kg (160 lb)   BMI 25 82 kg/m²    BP Readings from Last 3 Encounters:   03/02/22 132/84   02/08/22 112/78   01/11/22 146/100      Wt Readings from Last 3 Encounters:   03/02/22 72 6 kg (160 lb)   02/08/22 73 9 kg (163 lb)   01/11/22 74 4 kg (164 lb)      Physical Exam  Constitutional:       General: He is not in acute distress  Appearance: Normal appearance  He is not ill-appearing, toxic-appearing or diaphoretic  HENT:      Head: Normocephalic and atraumatic  Right Ear: External ear normal       Left Ear: External ear normal    Eyes:      Extraocular Movements: Extraocular movements intact  Conjunctiva/sclera: Conjunctivae normal    Pulmonary:      Effort: Pulmonary effort is normal  No respiratory distress  Musculoskeletal:      Cervical back: Normal range of motion and neck supple  No rigidity  Skin:     Findings: No erythema or rash  Neurological:      General: No focal deficit present  Mental Status: He is alert and oriented to person, place, and time  Motor: No tremor  Psychiatric:         Attention and Perception: Attention normal  He does not perceive auditory or visual hallucinations  Mood and Affect: Affect normal  Mood is anxious  Behavior: Behavior normal          Thought Content: Thought content is not paranoid or delusional  Thought content does not include homicidal or suicidal ideation  Thought content does not include homicidal or suicidal plan  Return if symptoms worsen or fail to improve  Asad Farmer MD    Note: Portions of the record have been created with voice recognition software  Occasional wrong word or "sound a like" substitutions may have occurred due to the inherent limitations of voice recognition software  Read the chart carefully and recognize, using context, where substitutions have occurred

## 2022-03-01 NOTE — TELEPHONE ENCOUNTER
Spoke to patient  He is going to AA this morning  His last drink was noon yesterday but does not remember how much he had because he was so drunk     He started drinking last week  He is coming in on 3/2/2022 to office for visit  Amalia Crouch      He is asking for a refill for Anabuse unless he waits until he sees Dr Alejandro Torres tomorrow

## 2022-03-01 NOTE — TELEPHONE ENCOUNTER
Patient has refills of antabuse  I believe he is asking for the benzodiazapine  Is patient having any signs or symptoms of alcohol withdrawal? Tremors, nausea/vomiting, sweating, agitation, hallucinations, tachycardia, elevated blood pressure? If no symptoms then do not need to treat for withdrawal      If symptomatic will send a few tablets

## 2022-03-01 NOTE — TELEPHONE ENCOUNTER
Lyly CUNNINGHAM spoke with [facility]  He states he dried out yesterday  He does have refills

## 2022-03-02 ENCOUNTER — PATIENT MESSAGE (OUTPATIENT)
Dept: FAMILY MEDICINE CLINIC | Facility: CLINIC | Age: 52
End: 2022-03-02

## 2022-03-02 ENCOUNTER — OFFICE VISIT (OUTPATIENT)
Dept: FAMILY MEDICINE CLINIC | Facility: CLINIC | Age: 52
End: 2022-03-02
Payer: COMMERCIAL

## 2022-03-02 VITALS
BODY MASS INDEX: 25.71 KG/M2 | HEART RATE: 88 BPM | HEIGHT: 66 IN | DIASTOLIC BLOOD PRESSURE: 84 MMHG | RESPIRATION RATE: 16 BRPM | TEMPERATURE: 95.8 F | SYSTOLIC BLOOD PRESSURE: 132 MMHG | WEIGHT: 160 LBS

## 2022-03-02 DIAGNOSIS — F41.9 ANXIETY: ICD-10-CM

## 2022-03-02 DIAGNOSIS — F41.9 ANXIETY: Primary | ICD-10-CM

## 2022-03-02 DIAGNOSIS — F10.21 ALCOHOL USE DISORDER, SEVERE, IN EARLY REMISSION, DEPENDENCE (HCC): Primary | ICD-10-CM

## 2022-03-02 PROCEDURE — 3008F BODY MASS INDEX DOCD: CPT | Performed by: FAMILY MEDICINE

## 2022-03-02 PROCEDURE — 99213 OFFICE O/P EST LOW 20 MIN: CPT | Performed by: FAMILY MEDICINE

## 2022-03-02 RX ORDER — HYDROXYZINE HYDROCHLORIDE 25 MG/1
25 TABLET, FILM COATED ORAL EVERY 6 HOURS PRN
COMMUNITY
End: 2022-03-03 | Stop reason: SDUPTHER

## 2022-03-03 RX ORDER — HYDROXYZINE HYDROCHLORIDE 25 MG/1
25 TABLET, FILM COATED ORAL EVERY 6 HOURS PRN
Qty: 90 TABLET | Refills: 3 | Status: SHIPPED | OUTPATIENT
Start: 2022-03-03

## 2022-04-09 DIAGNOSIS — F41.9 ANXIETY: ICD-10-CM

## 2022-04-09 DIAGNOSIS — R00.2 PALPITATIONS: ICD-10-CM

## 2022-04-09 DIAGNOSIS — N52.2 DRUG-INDUCED ERECTILE DYSFUNCTION: ICD-10-CM

## 2022-04-11 RX ORDER — PROPRANOLOL HYDROCHLORIDE 20 MG/1
20 TABLET ORAL 2 TIMES DAILY
Qty: 180 TABLET | Refills: 0 | Status: SHIPPED | OUTPATIENT
Start: 2022-04-11 | End: 2022-07-11 | Stop reason: SDUPTHER

## 2022-04-11 RX ORDER — TADALAFIL 20 MG/1
10 TABLET ORAL DAILY PRN
Qty: 10 TABLET | Refills: 0 | Status: SHIPPED | OUTPATIENT
Start: 2022-04-11 | End: 2022-05-18 | Stop reason: SDUPTHER

## 2022-05-18 DIAGNOSIS — N52.2 DRUG-INDUCED ERECTILE DYSFUNCTION: ICD-10-CM

## 2022-05-18 DIAGNOSIS — F41.9 ANXIETY: ICD-10-CM

## 2022-05-18 RX ORDER — TADALAFIL 20 MG/1
10 TABLET ORAL DAILY PRN
Qty: 10 TABLET | Refills: 0 | Status: SHIPPED | OUTPATIENT
Start: 2022-05-18 | End: 2022-07-07 | Stop reason: SDUPTHER

## 2022-05-18 RX ORDER — BUPROPION HYDROCHLORIDE 150 MG/1
150 TABLET ORAL EVERY MORNING
Qty: 90 TABLET | Refills: 0 | Status: SHIPPED | OUTPATIENT
Start: 2022-05-18

## 2022-05-31 ENCOUNTER — TELEMEDICINE (OUTPATIENT)
Dept: FAMILY MEDICINE CLINIC | Facility: CLINIC | Age: 52
End: 2022-05-31
Payer: COMMERCIAL

## 2022-05-31 DIAGNOSIS — U07.1 COVID-19: Primary | ICD-10-CM

## 2022-05-31 PROCEDURE — 99213 OFFICE O/P EST LOW 20 MIN: CPT | Performed by: FAMILY MEDICINE

## 2022-05-31 NOTE — PROGRESS NOTES
COVID-19 Outpatient Progress Note    Assessment/Plan:    Problem List Items Addressed This Visit    None     Visit Diagnoses     COVID-19    -  Primary    Relevant Medications    nirmatrelvir & ritonavir (Paxlovid) tablet therapy pack         Disposition:     Patient has COVID-19 infection  Based off CDC guidelines, they were recommended to isolate for 5 days from the date of the positive test  If they remain asymptomatic, isolation may be ended followed by 5 days of wearing a mask when around othes to minimize risk of infecting others  If they have a fever, continue to stay home until fever resolves for at least 24 hours  Discussed symptom directed medication options with patient  Discussed vitamin D, vitamin C, and/or zinc supplementation with patient  Recommend supportive care  Will also treat with Paxilovid  Discussed that patient should not take Cialis during the time he is taking Paxlovid    Patient meets criteria for PAXLOVID and they have been counseled appropriately according to EUA documentation released by the FDA  After discussion, patient agrees to treatment  Thresa Artie is an investigational medicine used to treat mild-to-moderate COVID-19 in adults and children (15years of age and older weighing at least 80 pounds (40 kg)) with positive results of direct SARS-CoV-2 viral testing, and who are at high risk for progression to severe COVID-19, including hospitalization or death  PAXLOVID is investigational because it is still being studied  There is limited information about the safety and effectiveness of using PAXLOVID to treat people with mild-to-moderate COVID-19      The FDA has authorized the emergency use of PAXLOVID for the treatment of mild-tomoderate COVID-19 in adults and children (15years of age and older weighing at least 80 pounds (40 kg)) with a positive test for the virus that causes COVID-19, and who are at high risk for progression to severe COVID-19, including hospitalization or death, under an EUA  What should I tell my healthcare provider before I take PAXLOVID? Tell your healthcare provider if you:  - Have any allergies  - Have liver or kidney disease  - Are pregnant or plan to become pregnant  - Are breastfeeding a child  - Have any serious illnesses    Tell your healthcare provider about all the medicines you take, including prescription and over-the-counter medicines, vitamins, and herbal supplements  Some medicines may interact with PAXLOVID and may cause serious side effects  Keep a list of your medicines to show your healthcare provider and pharmacist when you get a new medicine  You can ask your healthcare provider or pharmacist for a list of medicines that interact with PAXLOVID  Do not start taking a new medicine without telling your healthcare provider  Your healthcare provider can tell you if it is safe to take PAXLOVID with other medicines  Tell your healthcare provider if you are taking combined hormonal contraceptive  PAXLOVID may affect how your birth control pills work  Females who are able to become pregnant should use another effective alternative form of contraception or an additional barrier method of contraception  Talk to your healthcare provider if you have any questions about contraceptive methods that might be right for you  How do I take PAXLOVID? PAXLOVID consists of 2 medicines: nirmatrelvir and ritonavir  - Take 2 pink tablets of nirmatrelvir with 1 white tablet of ritonavir by mouth 2 times each day (in the morning and in the evening) for 5 days  For each dose, take all 3 tablets at the same time  - If you have kidney disease, talk to your healthcare provider  You may need a different dose  - Swallow the tablets whole  Do not chew, break, or crush the tablets  - Take PAXLOVID with or without food  - Do not stop taking PAXLOVID without talking to your healthcare provider, even if you feel better    - If you miss a dose of PAXLOVID within 8 hours of the time it is usually taken, take it as soon as you remember  If you miss a dose by more than 8 hours, skip the missed dose and take the next dose at your regular time  Do not take 2 doses of PAXLOVID at the same time  - If you take too much PAXLOVID, call your healthcare provider or go to the nearest hospital emergency room right away  - If you are taking a ritonavir- or cobicistat-containing medicine to treat hepatitis C or Human Immunodeficiency Virus (HIV), you should continue to take your medicine as prescribed by your healthcare provider   - Talk to your healthcare provider if you do not feel better or if you feel worse after 5 days  Who should generally not take PAXLOVID? Do not take PAXLOVID if:  You are allergic to nirmatrelvir, ritonavir, or any of the ingredients in PAXLOVID  You are taking any of the following medicines:  - Alfuzosin  - Pethidine, piroxicam, propoxyphene  - Ranolazine  - Amiodarone, dronedarone, flecainide, propafenone, quinidine  - Colchicine  - Lurasidone, pimozide, clozapine  - Dihydroergotamine, ergotamine, methylergonovine  - Lovastatin, simvastatin  - Sildenafil (Revatio®) for pulmonary arterial hypertension (PAH)  - Triazolam, oral midazolam  - Apalutamide  - Carbamazepine, phenobarbital, phenytoin  - Rifampin  - St  Shays Wort (hypericum perforatum)    What are the important possible side effects of PAXLOVID? Possible side effects of PAXLOVID are:  - Liver Problems  Tell your healthcare provider right away if you have any of these signs and symptoms of liver problems: loss of appetite, yellowing of your skin and the whites of eyes (jaundice), dark-colored urine, pale colored stools and itchy skin, stomach area (abdominal) pain  - Resistance to HIV Medicines  If you have untreated HIV infection, PAXLOVID may lead to some HIV medicines not working as well in the future    - Other possible side effects include: altered sense of taste, diarrhea, high blood pressure, or muscle aches    These are not all the possible side effects of PAXLOVID  Not many people have taken PAXLOVID  Serious and unexpected side effects may happen  Roque Dias is still being studied, so it is possible that all of the risks are not known at this time  What other treatment choices are there? Like Crystal Vallejo may allow for the emergency use of other medicines to treat people with COVID-19  Go to [facility] for information on the emergency use of other medicines that are authorized by FDA to treat people with COVID-19  Your healthcare provider may talk with you about clinical trials for which you may be eligible  It is your choice to be treated or not to be treated with PAXLOVID  Should you decide not to receive it or for your child not to receive it, it will not change your standard medical care  What if I am pregnant or breastfeeding? There is no experience treating pregnant women or breastfeeding mothers with PAXLOVID  For a mother and unborn baby, the benefit of taking PAXLOVID may be greater than the risk from the treatment  If you are pregnant, discuss your options and specific situation with your healthcare provider  It is recommended that you use effective barrier contraception or do not have sexual activity while taking PAXLOVID  If you are breastfeeding, discuss your options and specific situation with your healthcare provider  How do I report side effects with PAXLOVID? Contact your healthcare provider if you have any side effects that bother you or do not go away  Report side effects to FDA MedWatch at www fda gov/medwatch or call 0-682-TBL6664 or you can report side effects to [facility]  at the contact information provided below  Website Fax number Telephone number   [employer] [phone]     How should I store Nico Burgessle? Store PAXLOVID tablets at room temperature between 68°F to 77°F (20°C to 25°C)  Full fact sheet for patients, parents, and caregivers can be found at: [employer]    I have spent 18 minutes directly with the patient  Greater than 50% of this time was spent in counseling/coordination of care regarding: risks and benefits of treatment options, instructions for management, patient and family education and impressions  Encounter provider Jordyn Motta MD    Provider located at [address]  [phone]    Recent Visits  No visits were found meeting these conditions  Showing recent visits within past 7 days and meeting all other requirements  Today's Visits  Date Type Provider Dept   05/31/22 Telemedicine Jordyn Motta MD [facility]   Showing today's visits and meeting all other requirements  Future Appointments  No visits were found meeting these conditions  Showing future appointments within next 150 days and meeting all other requirements     This virtual check-in was done via [facility] and patient was informed that this is a secure, HIPAA-compliant platform  He agrees to proceed  Patient agrees to participate in a virtual check in via telephone or video visit instead of presenting to the office to address urgent/immediate medical needs  Patient is aware this is a billable service  After connecting through [facility], the patient was identified by name and date of birth  Kojo Baez was informed that this was a telemedicine visit and that the exam was being conducted confidentially over secure lines  My office door was closed  No one else was in the room  Kojo Baez acknowledged consent and understanding of privacy and security of the telemedicine visit   I informed the patient that I have reviewed his record in Epic and presented the opportunity for him to ask any questions regarding the visit today  The patient agreed to participate  Verification of patient location:  Patient is located in the following state in which I hold an active license: [place]    Subjective:   Johan Hernandez is a 46 y o  male who has been screened for COVID-19  Symptom change since last report: unchanged  Patient's symptoms include fever, chills, fatigue, nasal congestion, sore throat, cough, chest tightness, nausea and diarrhea  Patient denies malaise, rhinorrhea, anosmia, loss of taste (altered taste), shortness of breath, abdominal pain, vomiting, myalgias and headaches  - Date of symptom onset: 5/29/2022  - Date of exposure: 5/26/2022  - Date of positive COVID-19 test: 5/31/2022  Type of test: Home antigen  Home antigen result verified by provider  Will get picture of test uploaded/scanned into patient's chart       COVID-19 vaccination status: Fully vaccinated with booster    He has been taking Tussin-D and Tylenol    Lab Results   Component Value Date    SARSCOV2 Negative 01/22/2021    [facility] Not Detected 12/24/2020     Past Medical History:   Diagnosis Date    Alcoholic ([address] )     Allergic rhinitis     Palpitation      Past Surgical History:   Procedure Laterality Date    VASECTOMY       Current Outpatient Medications   Medication Sig Dispense Refill    nirmatrelvir & ritonavir (Paxlovid) tablet therapy pack Take 3 tablets by mouth 2 (two) times a day for 5 days Take 2 nirmatrelvir tablets + 1 ritonavir tablet together per dose 30 tablet 0    buPROPion (Wellbutrin XL) 150 mg 24 hr tablet Take 1 tablet (150 mg total) by mouth every morning 90 tablet 0    disulfiram (ANTABUSE) 250 mg tablet Take 1 tablet (250 mg total) by mouth daily 30 tablet 1    Fexofenadine HCl (ALLEGRA ALLERGY PO) Take by mouth as needed       hydrOXYzine HCL (ATARAX) 25 mg tablet Take 1 tablet (25 mg total) by mouth every 6 (six) hours as needed for anxiety 90 tablet 3    multivitamin (THERAGRAN) TABS Take 1 tablet by mouth daily      omeprazole (PriLOSEC) 20 mg delayed release capsule Take 1 capsule (20 mg total) by mouth daily for 14 days 14 capsule 0    propranolol (INDERAL) 20 mg tablet Take 1 tablet (20 mg total) by mouth 2 (two) times a day 180 tablet 0    tadalafil (CIALIS) 20 MG tablet Take 0 5 tablets (10 mg total) by mouth as needed in the morning for erectile dysfunction  10 tablet 0     No current facility-administered medications for this visit  Allergies   Allergen Reactions    Other Allergic Rhinitis     seasonal       Review of Systems   Constitutional: Positive for chills, fatigue and fever  HENT: Positive for congestion and sore throat  Negative for rhinorrhea  Respiratory: Positive for cough and chest tightness  Negative for shortness of breath  Gastrointestinal: Positive for diarrhea and nausea  Negative for abdominal pain and vomiting  Musculoskeletal: Negative for myalgias  Neurological: Negative for headaches  Objective: There were no vitals filed for this visit  Physical Exam  Constitutional:       General: He is not in acute distress  Appearance: He is not ill-appearing or toxic-appearing  HENT:      Head: Normocephalic  Nose: Congestion present  Eyes:      Extraocular Movements: Extraocular movements intact  Conjunctiva/sclera: Conjunctivae normal    Pulmonary:      Effort: Pulmonary effort is normal  No respiratory distress  Comments: Able to speak in complete sentences without difficulty  Skin:     Coloration: Skin is not pale  Findings: No erythema  Neurological:      Mental Status: He is alert  VIRTUAL VISIT DISCLAIMER    Shahid Callahan verbally agrees to participate in [employer]   Pt is aware that [employer] could be limited without vital signs or the ability to perform a full hands-on physical exam  Frank Abdi understands he or the provider may request at any time to terminate the video visit and request the patient to seek care or treatment in person

## 2022-06-02 ENCOUNTER — PATIENT MESSAGE (OUTPATIENT)
Dept: FAMILY MEDICINE CLINIC | Facility: CLINIC | Age: 52
End: 2022-06-02

## 2022-07-07 DIAGNOSIS — N52.2 DRUG-INDUCED ERECTILE DYSFUNCTION: ICD-10-CM

## 2022-07-07 RX ORDER — TADALAFIL 20 MG/1
10 TABLET ORAL DAILY PRN
Qty: 10 TABLET | Refills: 0 | Status: SHIPPED | OUTPATIENT
Start: 2022-07-07 | End: 2022-08-19 | Stop reason: SDUPTHER

## 2022-07-11 DIAGNOSIS — F41.9 ANXIETY: ICD-10-CM

## 2022-07-11 DIAGNOSIS — R00.2 PALPITATIONS: ICD-10-CM

## 2022-07-11 RX ORDER — PROPRANOLOL HYDROCHLORIDE 20 MG/1
20 TABLET ORAL 2 TIMES DAILY
Qty: 180 TABLET | Refills: 0 | Status: SHIPPED | OUTPATIENT
Start: 2022-07-11 | End: 2022-09-06 | Stop reason: SDUPTHER

## 2022-08-19 DIAGNOSIS — N52.2 DRUG-INDUCED ERECTILE DYSFUNCTION: ICD-10-CM

## 2022-08-19 RX ORDER — TADALAFIL 20 MG/1
10 TABLET ORAL DAILY PRN
Qty: 10 TABLET | Refills: 0 | Status: SHIPPED | OUTPATIENT
Start: 2022-08-19 | End: 2022-09-06 | Stop reason: SDUPTHER

## 2022-08-29 ENCOUNTER — RA CDI HCC (OUTPATIENT)
Dept: OTHER | Facility: HOSPITAL | Age: 52
End: 2022-08-29

## 2022-09-01 RX ORDER — LEVETIRACETAM 500 MG/1
TABLET ORAL
COMMUNITY
Start: 2022-08-02 | End: 2022-09-06 | Stop reason: ALTCHOICE

## 2022-09-05 NOTE — PROGRESS NOTES
ANNUAL PHYSICAL    Date of Service: 22  Primary Care Provider:   Rao Fritz MD       Name: Debbi Klinefelter       : 1970       Age:52 y o  Sex: male      MRN: [id number]      Chief Complaint:Physical Exam         Assessment and Plan:  46 y o  male exam      1  Health Maintenance  - Colon Cancer Screening: last colonoscopy in 2020, recommended 3 year follow-up   - Prostate Cancer Screen: Reviewed risks/benefits of screening and patient declines at this time  - Labs: recent labs during rehab admission    - Immunizations: Reviewed  Recommend yearly flu vaccine  2  Other diagnoses addressed today:   Problem List Items Addressed This Visit        Digestive    Functional dyspepsia     Recommended switching from daily PPI to H2 blocker, provided patient with taper instructions  Recommended lifestyle and diet modifications  Other    Alcohol use disorder, severe, in controlled environment, dependence ([facility] ) - Primary     He continues to drink alcohol, has not intentions on stopping  Advised that he abstain from alcohol given harmful impacts on health  Anxiety     Improved control of symptoms with Wellbutrin, which he resumed last month  Recommended continuing medication to help with anxiety, as well as possibly decreasing alcohol cravings  Continue propranolol for anxiety induced palpitations  Relevant Medications    buPROPion (Wellbutrin XL) 150 mg 24 hr tablet    propranolol (INDERAL) 20 mg tablet      Other Visit Diagnoses     Annual physical exam        Palpitations        Relevant Medications    propranolol (INDERAL) 20 mg tablet    Other male erectile dysfunction        Relevant Medications    tadalafil (CIALIS) 20 MG tablet           Immunizations and preventive care screenings were discussed with patient today  Appropriate education was printed on patient's after visit summary      Counseling:  Alcohol/drug use: discussed moderation in alcohol intake, the recommendations for healthy alcohol use, and avoidance of illicit drug use  Dental Health: discussed importance of regular tooth brushing, flossing, and dental visits  Injury prevention: discussed safety/seat belts, safety helmets, smoke detectors, carbon dioxide detectors, and smoking near bedding or upholstery  Exercise: the importance of regular exercise/physical activity was discussed  Recommend exercise 3-5 times per week for at least 30 minutes  RTC 1 year for annual  visit or sooner PRN    Subjective:    Lorenzo Espinal is a 46 y o  male and is here for his comprehensive physical exam      Acute complaints:     See separate documentation  Diet and Physical Activity  Diet/Nutrition: does follow a well balanced diet  Exercise: no formal exercise  General Health  Vision: no vision problems and goes for regular eye exams  Dental: regular dental visits          Histories Updated and Reviewed 9/6/2022:  Patient's Medications   New Prescriptions    No medications on file   Previous Medications    FEXOFENADINE HCL (ALLEGRA ALLERGY PO)    Take by mouth as needed     MULTIVITAMIN (THERAGRAN) TABS    Take 1 tablet by mouth daily    OMEPRAZOLE (PRILOSEC) 20 MG DELAYED RELEASE CAPSULE    Take 1 capsule (20 mg total) by mouth daily for 14 days    PROBIOTIC PRODUCT (PROBIOTIC-10) CHEW    Chew in the morning   Modified Medications    Modified Medication Previous Medication    BUPROPION (WELLBUTRIN XL) 150 MG 24 HR TABLET buPROPion (Wellbutrin XL) 150 mg 24 hr tablet       Take 1 tablet (150 mg total) by mouth every morning    Take 1 tablet (150 mg total) by mouth every morning    PROPRANOLOL (INDERAL) 20 MG TABLET propranolol (INDERAL) 20 mg tablet       Take 1 tablet (20 mg total) by mouth 2 (two) times a day    Take 1 tablet (20 mg total) by mouth 2 (two) times a day    TADALAFIL (CIALIS) 20 MG TABLET tadalafil (CIALIS) 20 MG tablet       Take 0 5 tablets (10 mg total) by mouth daily as needed for erectile dysfunction    Take 0 5 tablets (10 mg total) by mouth daily as needed for erectile dysfunction   Discontinued Medications    DISULFIRAM (ANTABUSE) 250 MG TABLET    Take 1 tablet (250 mg total) by mouth daily    HYDROXYZINE HCL (ATARAX) 25 MG TABLET    Take 1 tablet (25 mg total) by mouth every 6 (six) hours as needed for anxiety    LEVETIRACETAM (KEPPRA) 500 MG TABLET         Allergies   Allergen Reactions    Other Allergic Rhinitis     seasonal     Past Medical History:   Diagnosis Date    [facility]     Allergic rhinitis     Palpitation      Social History     Socioeconomic History    Marital status:      Spouse name: Not on file    Number of children: Not on file    Years of education: Not on file    Highest education level: Not on file   Occupational History    Not on file   Tobacco Use    Smoking status: Current Some Day Smoker     Types: Cigars    Smokeless tobacco: Never Used    Tobacco comment: cigars 1 daily   Vaping Use    Vaping Use: Never used   Substance and Sexual Activity    Alcohol use: Not Currently    Drug use: No    Sexual activity: Yes     Partners: Female   Other Topics Concern    Not on file   Social History Narrative    Not on file     Social Determinants of Health     Financial Resource Strain: Not on file   Food Insecurity: Not on file   Transportation Needs: Not on file   Physical Activity: Not on file   Stress: Not on file   Social Connections: Not on file   Intimate Partner Violence: Not on file   Housing Stability: Not on file     Immunization History   Administered Date(s) Administered    COVID-19 MODERNA VACC 0 25 ML IM BOOSTER 12/02/2021    COVID-19 MODERNA VACC 0 5 ML IM 04/01/2021, 04/30/2021, 12/02/2021    INFLUENZA 12/24/2018, 11/15/2020, 10/18/2021    Influenza, injectable, quadrivalent, preservative free 0 5 mL 12/23/2018, 10/23/2019, 11/15/2020    Zoster Vaccine Recombinant 10/08/2020, 12/08/2020       PHQ-2/9 Depression Screening Objective:  /84   Pulse 73   Temp (!) 95 6 °F (35 3 °C)   Resp 17   Ht 5' 6" (1 676 m)   Wt 73 3 kg (161 lb 8 oz)   SpO2 98%   BMI 26 07 kg/m²   BP Readings from Last 3 Encounters:   09/06/22 128/84   03/02/22 132/84   02/08/22 112/78      Wt Readings from Last 3 Encounters:   09/06/22 73 3 kg (161 lb 8 oz)   03/02/22 72 6 kg (160 lb)   02/08/22 73 9 kg (163 lb)      Physical Exam  Constitutional:       General: He is not in acute distress  Appearance: Normal appearance  He is not ill-appearing or toxic-appearing  HENT:      Head: Normocephalic and atraumatic  Right Ear: Tympanic membrane, ear canal and external ear normal       Left Ear: Tympanic membrane, ear canal and external ear normal       Nose: Nose normal       Mouth/Throat:      Mouth: Mucous membranes are moist    Eyes:      Extraocular Movements: Extraocular movements intact  Conjunctiva/sclera: Conjunctivae normal    Cardiovascular:      Rate and Rhythm: Normal rate and regular rhythm  Pulses: Normal pulses  Heart sounds: Normal heart sounds  No murmur heard  No friction rub  No gallop  Pulmonary:      Effort: Pulmonary effort is normal  No respiratory distress  Breath sounds: Normal breath sounds  No stridor  No wheezing, rhonchi or rales  Abdominal:      General: There is no distension  Palpations: Abdomen is soft  Tenderness: There is no abdominal tenderness  Musculoskeletal:      Cervical back: Normal range of motion and neck supple  No rigidity  Right lower leg: No edema  Left lower leg: No edema  Skin:     General: Skin is warm and dry  Findings: No erythema or rash  Neurological:      General: No focal deficit present  Mental Status: He is alert and oriented to person, place, and time     Psychiatric:         Mood and Affect: Mood normal          Behavior: Behavior normal          Patient Care Team:  Liu Zendejas MD as PCP - General (Family Medicine)    Felisa Eldridge MD    Note: Portions of the record may have been created with voice recognition software  Occasional wrong word or "sound a like" substitutions may have occurred due to the inherent limitations of voice recognition software  Read the chart carefully and recognize, using context, where substitutions have occurred

## 2022-09-06 ENCOUNTER — OFFICE VISIT (OUTPATIENT)
Dept: FAMILY MEDICINE CLINIC | Facility: CLINIC | Age: 52
End: 2022-09-06
Payer: COMMERCIAL

## 2022-09-06 VITALS
SYSTOLIC BLOOD PRESSURE: 128 MMHG | BODY MASS INDEX: 25.96 KG/M2 | HEIGHT: 66 IN | RESPIRATION RATE: 17 BRPM | DIASTOLIC BLOOD PRESSURE: 84 MMHG | OXYGEN SATURATION: 98 % | WEIGHT: 161.5 LBS | HEART RATE: 73 BPM | TEMPERATURE: 95.6 F

## 2022-09-06 DIAGNOSIS — F10.20 ALCOHOL USE DISORDER, SEVERE, IN CONTROLLED ENVIRONMENT, DEPENDENCE (HCC): Primary | ICD-10-CM

## 2022-09-06 DIAGNOSIS — K30 FUNCTIONAL DYSPEPSIA: ICD-10-CM

## 2022-09-06 DIAGNOSIS — R00.2 PALPITATIONS: ICD-10-CM

## 2022-09-06 DIAGNOSIS — Z00.00 ANNUAL PHYSICAL EXAM: ICD-10-CM

## 2022-09-06 DIAGNOSIS — F41.9 ANXIETY: ICD-10-CM

## 2022-09-06 DIAGNOSIS — N52.8 OTHER MALE ERECTILE DYSFUNCTION: ICD-10-CM

## 2022-09-06 PROBLEM — K52.9 CHRONIC DIARRHEA OF UNKNOWN ORIGIN: Status: RESOLVED | Noted: 2021-01-19 | Resolved: 2022-09-06

## 2022-09-06 PROCEDURE — 99396 PREV VISIT EST AGE 40-64: CPT | Performed by: FAMILY MEDICINE

## 2022-09-06 PROCEDURE — 99213 OFFICE O/P EST LOW 20 MIN: CPT | Performed by: FAMILY MEDICINE

## 2022-09-06 RX ORDER — TADALAFIL 20 MG/1
10 TABLET ORAL DAILY PRN
Qty: 10 TABLET | Refills: 5 | Status: SHIPPED | OUTPATIENT
Start: 2022-09-06 | End: 2022-10-11 | Stop reason: SDUPTHER

## 2022-09-06 RX ORDER — BUPROPION HYDROCHLORIDE 150 MG/1
150 TABLET ORAL EVERY MORNING
Qty: 90 TABLET | Refills: 3 | Status: SHIPPED | OUTPATIENT
Start: 2022-09-06

## 2022-09-06 RX ORDER — PROPRANOLOL HYDROCHLORIDE 20 MG/1
20 TABLET ORAL 2 TIMES DAILY
Qty: 180 TABLET | Refills: 3 | Status: SHIPPED | OUTPATIENT
Start: 2022-09-06

## 2022-09-06 NOTE — ASSESSMENT & PLAN NOTE
Improved control of symptoms with Wellbutrin, which he resumed last month  Recommended continuing medication to help with anxiety, as well as possibly decreasing alcohol cravings  Continue propranolol for anxiety induced palpitations

## 2022-09-06 NOTE — PROGRESS NOTES
FAMILY MEDICINE PROGRESS NOTE    Date of Service: 22  Primary Care Provider:   Fernando Leal MD       Name: Chandrika Horta       : 1970       Age:52 y o  Sex: male      MRN: [id number]      Chief Complaint:Physical Exam       ASSESSMENT and PLAN:  Chandrika Horta is a 46 y o  male with:     Problem List Items Addressed This Visit        Digestive    Functional dyspepsia     Recommended switching from daily PPI to H2 blocker, provided patient with taper instructions  Recommended lifestyle and diet modifications  Other    Alcohol use disorder, severe, in controlled environment, dependence ([facility] [address] ) - Primary     He continues to drink alcohol, has not intentions on stopping  Advised that he abstain from alcohol given harmful impacts on health  Anxiety     Improved control of symptoms with Wellbutrin, which he resumed last month  Recommended continuing medication to help with anxiety, as well as possibly decreasing alcohol cravings  Continue propranolol for anxiety induced palpitations  Relevant Medications    buPROPion (Wellbutrin XL) 150 mg 24 hr tablet    propranolol (INDERAL) 20 mg tablet      Other Visit Diagnoses     Annual physical exam        Palpitations        Relevant Medications    propranolol (INDERAL) 20 mg tablet    Other male erectile dysfunction        Relevant Medications    tadalafil (CIALIS) 20 MG tablet          SUBJECTIVE:  Chandrika Horta is a 46 y o  male who presents today with a chief complaint of Physical Exam  HPI     He was in alcohol rehab last month  He has had multiple rehab admissions in the last year  His symptoms from his most recent rehab stay were attributed to anxiety  He had not had drink for 33 days until this last weekend  He does not foresee himself not drinking, he likes how it makes him feel  He has not found AA to be helpful  He was previously on Wellbutrin, but had stopped this months ago   He does feel like when he resumed it after rehab  His anxiety improved  He remains on propranolol 20 mg twice daily  He is also on omeprazole daily, he did not switch to pepcid  Review of Systems   Gastrointestinal: Negative for abdominal pain, blood in stool, constipation and diarrhea  Reflux   Psychiatric/Behavioral: Negative for dysphoric mood and sleep disturbance  The patient is not nervous/anxious (improved)  I have reviewed the patient's Past Medical History  Current Outpatient Medications:     buPROPion (Wellbutrin XL) 150 mg 24 hr tablet, Take 1 tablet (150 mg total) by mouth every morning, Disp: 90 tablet, Rfl: 3    Fexofenadine HCl (ALLEGRA ALLERGY PO), Take by mouth as needed , Disp: , Rfl:     multivitamin (THERAGRAN) TABS, Take 1 tablet by mouth daily, Disp: , Rfl:     omeprazole (PriLOSEC) 20 mg delayed release capsule, Take 1 capsule (20 mg total) by mouth daily for 14 days, Disp: 14 capsule, Rfl: 0    Probiotic Product (Probiotic-10) CHEW, Chew in the morning, Disp: , Rfl:     propranolol (INDERAL) 20 mg tablet, Take 1 tablet (20 mg total) by mouth 2 (two) times a day, Disp: 180 tablet, Rfl: 3    tadalafil (CIALIS) 20 MG tablet, Take 0 5 tablets (10 mg total) by mouth daily as needed for erectile dysfunction, Disp: 10 tablet, Rfl: 5    OBJECTIVE:  /84   Pulse 73   Temp (!) 95 6 °F (35 3 °C)   Resp 17   Ht 5' 6" (1 676 m)   Wt 73 3 kg (161 lb 8 oz)   SpO2 98%   BMI 26 07 kg/m²    BP Readings from Last 3 Encounters:   09/06/22 128/84   03/02/22 132/84   02/08/22 112/78      Wt Readings from Last 3 Encounters:   09/06/22 73 3 kg (161 lb 8 oz)   03/02/22 72 6 kg (160 lb)   02/08/22 73 9 kg (163 lb)      Physical Exam  Constitutional:       General: He is not in acute distress  Appearance: Normal appearance  He is not ill-appearing or toxic-appearing  HENT:      Head: Normocephalic and atraumatic        Right Ear: Tympanic membrane, ear canal and external ear normal       Left Ear: Tympanic membrane, ear canal and external ear normal       Nose: Nose normal       Mouth/Throat:      Mouth: Mucous membranes are moist    Eyes:      Extraocular Movements: Extraocular movements intact  Conjunctiva/sclera: Conjunctivae normal    Cardiovascular:      Rate and Rhythm: Normal rate and regular rhythm  Pulses: Normal pulses  Heart sounds: Normal heart sounds  No murmur heard  No friction rub  No gallop  Pulmonary:      Effort: Pulmonary effort is normal  No respiratory distress  Breath sounds: Normal breath sounds  No stridor  No wheezing, rhonchi or rales  Abdominal:      General: There is no distension  Palpations: Abdomen is soft  Tenderness: There is no abdominal tenderness  Musculoskeletal:      Cervical back: Normal range of motion and neck supple  No rigidity  Right lower leg: No edema  Left lower leg: No edema  Skin:     General: Skin is warm and dry  Findings: No erythema or rash  Neurological:      General: No focal deficit present  Mental Status: He is alert and oriented to person, place, and time  Psychiatric:         Mood and Affect: Mood normal          Behavior: Behavior normal            BMI Counseling: Body mass index is 26 07 kg/m²  The BMI is above normal  Nutrition recommendations include decreasing portion sizes, encouraging healthy choices of fruits and vegetables, consuming healthier snacks and reducing intake of saturated and trans fat  Exercise recommendations include moderate physical activity 150 minutes/week  Rationale for BMI follow-up plan is due to patient being overweight or obese  Return in about 1 year (around 9/6/2023) for Annual physical     Gato Child MD    Note: Portions of the record have been created with voice recognition software  Occasional wrong word or "sound a like" substitutions may have occurred due to the inherent limitations of voice recognition software   Read the chart carefully and recognize, using context, where substitutions have occurred

## 2022-09-06 NOTE — ASSESSMENT & PLAN NOTE
He continues to drink alcohol, has not intentions on stopping  Advised that he abstain from alcohol given harmful impacts on health

## 2022-10-11 DIAGNOSIS — N52.8 OTHER MALE ERECTILE DYSFUNCTION: ICD-10-CM

## 2022-10-11 RX ORDER — TADALAFIL 20 MG/1
10 TABLET ORAL DAILY PRN
Qty: 10 TABLET | Refills: 0 | Status: SHIPPED | OUTPATIENT
Start: 2022-10-11

## 2022-11-06 DIAGNOSIS — R00.2 PALPITATIONS: ICD-10-CM

## 2022-11-06 DIAGNOSIS — F41.9 ANXIETY: ICD-10-CM

## 2022-11-07 RX ORDER — PROPRANOLOL HYDROCHLORIDE 20 MG/1
20 TABLET ORAL 2 TIMES DAILY
Qty: 180 TABLET | Refills: 0 | Status: SHIPPED | OUTPATIENT
Start: 2022-11-07

## 2022-11-13 DIAGNOSIS — N52.8 OTHER MALE ERECTILE DYSFUNCTION: ICD-10-CM

## 2022-11-14 RX ORDER — TADALAFIL 20 MG/1
10 TABLET ORAL DAILY PRN
Qty: 10 TABLET | Refills: 0 | Status: SHIPPED | OUTPATIENT
Start: 2022-11-14

## 2022-12-13 DIAGNOSIS — N52.8 OTHER MALE ERECTILE DYSFUNCTION: ICD-10-CM

## 2022-12-13 DIAGNOSIS — F41.9 ANXIETY: ICD-10-CM

## 2022-12-13 RX ORDER — BUPROPION HYDROCHLORIDE 150 MG/1
150 TABLET ORAL EVERY MORNING
Qty: 90 TABLET | Refills: 0 | Status: SHIPPED | OUTPATIENT
Start: 2022-12-13

## 2022-12-13 RX ORDER — TADALAFIL 20 MG/1
10 TABLET ORAL DAILY PRN
Qty: 10 TABLET | Refills: 0 | Status: SHIPPED | OUTPATIENT
Start: 2022-12-13

## 2023-01-12 DIAGNOSIS — N52.8 OTHER MALE ERECTILE DYSFUNCTION: ICD-10-CM

## 2023-01-12 RX ORDER — TADALAFIL 20 MG/1
10 TABLET ORAL DAILY PRN
Qty: 10 TABLET | Refills: 0 | Status: SHIPPED | OUTPATIENT
Start: 2023-01-12

## 2023-02-18 DIAGNOSIS — R00.2 PALPITATIONS: ICD-10-CM

## 2023-02-18 DIAGNOSIS — N52.8 OTHER MALE ERECTILE DYSFUNCTION: ICD-10-CM

## 2023-02-18 DIAGNOSIS — F41.9 ANXIETY: ICD-10-CM

## 2023-02-20 RX ORDER — PROPRANOLOL HYDROCHLORIDE 20 MG/1
20 TABLET ORAL 2 TIMES DAILY
Qty: 180 TABLET | Refills: 0 | Status: SHIPPED | OUTPATIENT
Start: 2023-02-20

## 2023-02-20 RX ORDER — TADALAFIL 20 MG/1
10 TABLET ORAL DAILY PRN
Qty: 10 TABLET | Refills: 0 | Status: SHIPPED | OUTPATIENT
Start: 2023-02-20

## 2023-02-26 ENCOUNTER — OFFICE VISIT (OUTPATIENT)
Dept: URGENT CARE | Age: 53
End: 2023-02-26

## 2023-02-26 VITALS
RESPIRATION RATE: 18 BRPM | TEMPERATURE: 97.5 F | OXYGEN SATURATION: 97 % | HEART RATE: 124 BPM | DIASTOLIC BLOOD PRESSURE: 100 MMHG | SYSTOLIC BLOOD PRESSURE: 160 MMHG

## 2023-02-26 DIAGNOSIS — J06.9 BACTERIAL UPPER RESPIRATORY INFECTION: Primary | ICD-10-CM

## 2023-02-26 DIAGNOSIS — B96.89 BACTERIAL UPPER RESPIRATORY INFECTION: Primary | ICD-10-CM

## 2023-02-26 RX ORDER — AZITHROMYCIN 250 MG/1
TABLET, FILM COATED ORAL
Qty: 6 TABLET | Refills: 0 | Status: SHIPPED | OUTPATIENT
Start: 2023-02-26 | End: 2023-03-02

## 2023-02-26 RX ORDER — AZITHROMYCIN 250 MG/1
TABLET, FILM COATED ORAL
Qty: 6 TABLET | Refills: 0 | Status: SHIPPED | OUTPATIENT
Start: 2023-02-26 | End: 2023-02-26 | Stop reason: SDUPTHER

## 2023-02-26 NOTE — PROGRESS NOTES
[facility]        NAME: Cam Taylor is a 46 y o  male  : 1970    MRN: [id number]  DATE: 2023  TIME: 1:10 PM    Assessment and Plan   Bacterial upper respiratory infection [J06 9, B96 89]  1  Bacterial upper respiratory infection  azithromycin (ZITHROMAX) 250 mg tablet    DISCONTINUED: azithromycin (ZITHROMAX) 250 mg tablet            Patient Instructions       Follow up with PCP in 3-5 days  Proceed to  ER if symptoms worsen  Chief Complaint     Chief Complaint   Patient presents with   • Cough     Patient states that he has a cough for over 1 week- been suing over cough medication which is not ffhelping         History of Present Illness       Symptoms started approximately 10 days ago with a cough  Tried mucinex and dayquil OTC with milk improvement in symptoms  Cough  Episode onset: approximately 10 days  The cough is non-productive  Pertinent negatives include no chest pain, ear congestion, ear pain, fever, headaches, hemoptysis, myalgias, nasal congestion, postnasal drip, rash, rhinorrhea, sore throat, shortness of breath, sweats, weight loss or wheezing  Nothing aggravates the symptoms  Review of Systems   Review of Systems   Constitutional: Negative for fever and weight loss  HENT: Negative for ear pain, postnasal drip, rhinorrhea and sore throat  Respiratory: Positive for cough and chest tightness  Negative for hemoptysis, shortness of breath and wheezing  Cardiovascular: Positive for palpitations (takes propanolol (forgot this a m ))  Negative for chest pain  Musculoskeletal: Negative for myalgias  Skin: Negative for rash  Neurological: Negative for headaches           Current Medications       Current Outpatient Medications:   •  azithromycin (ZITHROMAX) 250 mg tablet, Take 2 tablets today then 1 tablet daily x 4 days, Disp: 6 tablet, Rfl: 0  •  multivitamin (THERAGRAN) TABS, Take 1 tablet by mouth daily, Disp: , Rfl:   •  buPROPion (Wellbutrin XL) 150 mg 24 hr tablet, Take 1 tablet (150 mg total) by mouth every morning (Patient not taking: Reported on 2/26/2023), Disp: 90 tablet, Rfl: 0  •  Fexofenadine HCl (ALLEGRA ALLERGY PO), Take by mouth as needed  (Patient not taking: Reported on 2/26/2023), Disp: , Rfl:   •  omeprazole (PriLOSEC) 20 mg delayed release capsule, Take 1 capsule (20 mg total) by mouth daily for 14 days, Disp: 14 capsule, Rfl: 0  •  Probiotic Product (Probiotic-10) Isaiah Hurley in the morning (Patient not taking: Reported on 2/26/2023), Disp: , Rfl:   •  propranolol (INDERAL) 20 mg tablet, Take 1 tablet (20 mg total) by mouth 2 (two) times a day (Patient not taking: Reported on 2/26/2023), Disp: 180 tablet, Rfl: 0  •  tadalafil (CIALIS) 20 MG tablet, Take 0 5 tablets (10 mg total) by mouth daily as needed for erectile dysfunction (Patient not taking: Reported on 2/26/2023), Disp: 10 tablet, Rfl: 0    Current Allergies     Allergies as of 02/26/2023 - Reviewed 02/26/2023   Allergen Reaction Noted   • Other Allergic Rhinitis 01/31/2019            The following portions of the patient's history were reviewed and updated as appropriate: allergies, current medications, past family history, past medical history, past social history, past surgical history and problem list      Past Medical History:   Diagnosis Date   • Alcoholic ([address] )    • Allergic rhinitis    • Palpitation        Past Surgical History:   Procedure Laterality Date   • VASECTOMY         Family History   Problem Relation Age of Onset   • No Known Problems Mother    • No Known Problems Father          Medications have been verified  Objective   /100 (BP Location: Right arm, Patient Position: Sitting, Cuff Size: Standard)   Pulse (!) 124   Temp 97 5 °F (36 4 °C)   Resp 18   SpO2 97%   No LMP for male patient  Physical Exam     Physical Exam  HENT:      Right Ear: Tympanic membrane normal       Left Ear: Tympanic membrane normal       Nose: Rhinorrhea present  Mouth/Throat:      Mouth: Mucous membranes are moist       Comments: Post-nasal drip  Eyes:      Pupils: Pupils are equal, round, and reactive to light  Cardiovascular:      Rate and Rhythm: Tachycardia present  Pulmonary:      Effort: Pulmonary effort is normal  No respiratory distress  Breath sounds: Normal breath sounds  Neurological:      Mental Status: He is alert

## 2023-03-18 DIAGNOSIS — N52.8 OTHER MALE ERECTILE DYSFUNCTION: ICD-10-CM

## 2023-03-20 RX ORDER — TADALAFIL 20 MG/1
10 TABLET ORAL DAILY PRN
Qty: 10 TABLET | Refills: 0 | Status: SHIPPED | OUTPATIENT
Start: 2023-03-20

## 2023-03-24 DIAGNOSIS — F41.9 ANXIETY: ICD-10-CM

## 2023-03-24 RX ORDER — BUPROPION HYDROCHLORIDE 150 MG/1
150 TABLET ORAL EVERY MORNING
Qty: 90 TABLET | Refills: 0 | Status: SHIPPED | OUTPATIENT
Start: 2023-03-24

## 2023-05-25 DIAGNOSIS — R00.2 PALPITATIONS: ICD-10-CM

## 2023-05-25 DIAGNOSIS — N52.8 OTHER MALE ERECTILE DYSFUNCTION: ICD-10-CM

## 2023-05-25 DIAGNOSIS — F41.9 ANXIETY: ICD-10-CM

## 2023-05-25 RX ORDER — PROPRANOLOL HYDROCHLORIDE 20 MG/1
20 TABLET ORAL 2 TIMES DAILY
Qty: 180 TABLET | Refills: 0 | Status: SHIPPED | OUTPATIENT
Start: 2023-05-25

## 2023-05-25 RX ORDER — TADALAFIL 20 MG/1
10 TABLET ORAL DAILY PRN
Qty: 10 TABLET | Refills: 0 | Status: SHIPPED | OUTPATIENT
Start: 2023-05-25

## 2023-06-19 DIAGNOSIS — F41.9 ANXIETY: ICD-10-CM

## 2023-06-19 RX ORDER — BUPROPION HYDROCHLORIDE 150 MG/1
150 TABLET ORAL EVERY MORNING
Qty: 90 TABLET | Refills: 0 | Status: SHIPPED | OUTPATIENT
Start: 2023-06-19

## 2023-07-06 DIAGNOSIS — N52.8 OTHER MALE ERECTILE DYSFUNCTION: ICD-10-CM

## 2023-07-06 RX ORDER — TADALAFIL 20 MG/1
10 TABLET ORAL DAILY PRN
Qty: 10 TABLET | Refills: 0 | Status: SHIPPED | OUTPATIENT
Start: 2023-07-06

## 2023-08-24 DIAGNOSIS — N52.8 OTHER MALE ERECTILE DYSFUNCTION: ICD-10-CM

## 2023-08-24 RX ORDER — TADALAFIL 20 MG/1
10 TABLET ORAL DAILY PRN
Qty: 10 TABLET | Refills: 0 | Status: SHIPPED | OUTPATIENT
Start: 2023-08-24

## 2023-08-30 ENCOUNTER — RA CDI HCC (OUTPATIENT)
Dept: OTHER | Facility: HOSPITAL | Age: 53
End: 2023-08-30

## 2023-08-30 NOTE — PROGRESS NOTES
[facility] coding opportunities       Chart reviewed, no opportunity found: CHART REVIEWED, NO OPPORTUNITY FOUND        Patients Insurance        Commercial Insurance: Philip Solo

## 2023-09-07 ENCOUNTER — OFFICE VISIT (OUTPATIENT)
Dept: FAMILY MEDICINE CLINIC | Facility: CLINIC | Age: 53
End: 2023-09-07
Payer: COMMERCIAL

## 2023-09-07 VITALS
HEIGHT: 66 IN | DIASTOLIC BLOOD PRESSURE: 94 MMHG | BODY MASS INDEX: 25.96 KG/M2 | WEIGHT: 161.5 LBS | OXYGEN SATURATION: 96 % | TEMPERATURE: 97.5 F | SYSTOLIC BLOOD PRESSURE: 136 MMHG | HEART RATE: 86 BPM

## 2023-09-07 DIAGNOSIS — R73.01 IFG (IMPAIRED FASTING GLUCOSE): ICD-10-CM

## 2023-09-07 DIAGNOSIS — F10.90 CHRONIC ALCOHOL USE: ICD-10-CM

## 2023-09-07 DIAGNOSIS — Z00.00 ANNUAL PHYSICAL EXAM: Primary | ICD-10-CM

## 2023-09-07 DIAGNOSIS — N52.8 OTHER MALE ERECTILE DYSFUNCTION: ICD-10-CM

## 2023-09-07 DIAGNOSIS — Z12.5 SCREENING FOR PROSTATE CANCER: ICD-10-CM

## 2023-09-07 DIAGNOSIS — Z86.010 PERSONAL HISTORY OF COLONIC POLYPS: ICD-10-CM

## 2023-09-07 DIAGNOSIS — F19.90 SUBSTANCE USE DISORDER: ICD-10-CM

## 2023-09-07 DIAGNOSIS — R03.0 ELEVATED BLOOD PRESSURE READING IN OFFICE WITHOUT DIAGNOSIS OF HYPERTENSION: ICD-10-CM

## 2023-09-07 DIAGNOSIS — N52.1 ERECTILE DYSFUNCTION DUE TO DISEASES CLASSIFIED ELSEWHERE: ICD-10-CM

## 2023-09-07 DIAGNOSIS — F41.9 ANXIETY: ICD-10-CM

## 2023-09-07 DIAGNOSIS — E78.00 PURE HYPERCHOLESTEROLEMIA: ICD-10-CM

## 2023-09-07 PROCEDURE — 99396 PREV VISIT EST AGE 40-64: CPT | Performed by: FAMILY MEDICINE

## 2023-09-07 RX ORDER — BUPROPION HYDROCHLORIDE 150 MG/1
150 TABLET ORAL EVERY MORNING
Qty: 90 TABLET | Refills: 3 | Status: SHIPPED | OUTPATIENT
Start: 2023-09-07

## 2023-09-07 RX ORDER — TADALAFIL 20 MG/1
20 TABLET ORAL DAILY PRN
Qty: 30 TABLET | Refills: 5 | Status: SHIPPED | OUTPATIENT
Start: 2023-09-07 | End: 2024-03-05

## 2023-09-07 NOTE — PROGRESS NOTES
[address]    NAME: Giuliana Wong  AGE: 48 y.o. SEX: male  : 1970     DATE: 2023     Assessment and Plan:     Problem List Items Addressed This Visit        Other    Pure hypercholesterolemia    Relevant Orders    Lipid panel    Chronic alcohol use    Anxiety    Relevant Medications    buPROPion (Wellbutrin XL) 150 mg 24 hr tablet    Erectile dysfunction due to diseases classified elsewhere    Elevated blood pressure reading in office without diagnosis of hypertension    Relevant Orders    Comprehensive metabolic panel    CBC and Platelet   Other Visit Diagnoses     Annual physical exam    -  Primary    Other male erectile dysfunction        Relevant Medications    tadalafil (CIALIS) 20 MG tablet    IFG (impaired fasting glucose)        Relevant Orders    Hemoglobin A1C    Substance use disorder        Personal history of colonic polyps        Relevant Orders    Ambulatory referral to Gastroenterology    Screening for prostate cancer        Relevant Orders    PSA, Total Screen    BMI 26.0-26.9,adult            Continue to monitor blood pressure. Obtain lab work listed above. Refilled Cialis 20 mg daily for erectile dysfunction. Fasting glucose was elevated at last office visit. Obtain hemoglobin A1c. Emphasized importance of doing labs fasting. Colonoscopy in 2020 did show polyps. Referral made back to gastroenterology for 3-year colonoscopy. We will continue Wellbutrin 150 mg daily at this time. Discussed alcohol cessation. Immunizations and preventive care screenings were discussed with patient today. Appropriate education was printed on patient's after visit summary. Discussed risks and benefits of prostate cancer screening.  We discussed the controversial history of PSA screening for prostate cancer in the [facility] as well as the risk of over detection and over treatment of prostate cancer by way of PSA screening. The patient understands that PSA blood testing is an imperfect way to screen for prostate cancer and that elevated PSA levels in the blood may also be caused by infection, inflammation, prostatic trauma or manipulation, urological procedures, or by benign prostatic enlargement. The role of the digital rectal examination in prostate cancer screening was also discussed and I discussed with him that there is large interobserver variability in the findings of digital rectal examination. Counseling:  Alcohol/drug use: discussed moderation in alcohol intake, the recommendations for healthy alcohol use, and avoidance of illicit drug use. Dental Health: discussed importance of regular tooth brushing, flossing, and dental visits. Injury prevention: discussed safety/seat belts, safety helmets, smoke detectors, carbon dioxide detectors, and smoking near bedding or upholstery. Sexual health: discussed sexually transmitted diseases, partner selection, use of condoms, avoidance of unintended pregnancy, and contraceptive alternatives. Exercise: the importance of regular exercise/physical activity was discussed. Recommend exercise 3-5 times per week for at least 30 minutes. Return in 6 months (on 3/7/2024). Chief Complaint:     Chief Complaint   Patient presents with   • Physical Exam      History of Present Illness:     Adult Annual Physical   Patient here for a comprehensive physical exam. The patient reports problems - See below. Patient interested in stopping Wellbutrin. Feels great. Try to taper himself off of Wellbutrin in the past and misdiagnosed himself as having DTs and in the back in rehab. Currently drinking beer only 6 daily. He was drinking whiskey throughout the day. Diet and Physical Activity  Diet/Nutrition: well balanced diet and consuming 3-5 servings of fruits/vegetables daily. Exercise: walking.       Depression Screening  PHQ-2/9 Depression Screening    Little interest or pleasure in doing things: 0 - not at all  Feeling down, depressed, or hopeless: 0 - not at all  Trouble falling or staying asleep, or sleeping too much: 0 - not at all  Feeling tired or having little energy: 0 - not at all  Poor appetite or overeatin - not at all  Feeling bad about yourself - or that you are a failure or have let yourself or your family down: 0 - not at all  Trouble concentrating on things, such as reading the newspaper or watching television: 0 - not at all  Moving or speaking so slowly that other people could have noticed. Or the opposite - being so fidgety or restless that you have been moving around a lot more than usual: 0 - not at all  Thoughts that you would be better off dead, or of hurting yourself in some way: 0 - not at all  PHQ-9 Score: 0   PHQ-9 Interpretation: No or Minimal depression        General Health  Sleep: sleeps well. Hearing: normal - bilateral.  Vision: no vision problems. Dental: regular dental visits.  Health  Symptoms include: erectile dysfunction     Review of Systems:     Review of Systems   Constitutional: Negative for fatigue and fever. HENT: Negative for sore throat. Eyes: Negative for visual disturbance. Respiratory: Negative for cough, chest tightness and shortness of breath. Cardiovascular: Negative for chest pain, palpitations and leg swelling. Gastrointestinal: Negative for abdominal pain, constipation, diarrhea and nausea. Acid reflux. Stable. Returns if misses 1 dose   Endocrine: Negative for cold intolerance and heat intolerance. Genitourinary: Negative for flank pain. Musculoskeletal: Negative for back pain and neck pain. Skin: Negative for rash. Neurological: Negative for headaches. Psychiatric/Behavioral: Negative for behavioral problems and confusion.       Past Medical History:     Past Medical History:   Diagnosis Date   • Alcoholic ([address])    • Allergic rhinitis    • Palpitation       Past Surgical History:     Past Surgical History:   Procedure Laterality Date   • VASECTOMY        Family History:     Family History   Problem Relation Age of Onset   • No Known Problems Mother    • No Known Problems Father       Social History:     Social History     Socioeconomic History   • Marital status:      Spouse name: None   • Number of children: None   • Years of education: None   • Highest education level: None   Occupational History   • None   Tobacco Use   • Smoking status: Some Days     Types: Cigars   • Smokeless tobacco: Never   • Tobacco comments:     cigars 1 daily   Vaping Use   • Vaping Use: Never used   Substance and Sexual Activity   • Alcohol use: Not Currently   • Drug use: No   • Sexual activity: Yes     Partners: Female   Other Topics Concern   • None   Social History Narrative   • None     Social Determinants of Health     Financial Resource Strain: Not on file   Food Insecurity: Not on file   Transportation Needs: Not on file   Physical Activity: Not on file   Stress: Not on file   Social Connections: Not on file   Intimate Partner Violence: Not on file   Housing Stability: Not on file      Current Medications:     Current Outpatient Medications   Medication Sig Dispense Refill   • buPROPion (Wellbutrin XL) 150 mg 24 hr tablet Take 1 tablet (150 mg total) by mouth every morning 90 tablet 3   • multivitamin (THERAGRAN) TABS Take 1 tablet by mouth daily     • omeprazole (PriLOSEC) 20 mg delayed release capsule Take 1 capsule (20 mg total) by mouth daily for 14 days 14 capsule 0   • propranolol (INDERAL) 20 mg tablet Take 1 tablet (20 mg total) by mouth 2 (two) times a day 180 tablet 0   • tadalafil (CIALIS) 20 MG tablet Take 1 tablet (20 mg total) by mouth daily as needed for erectile dysfunction 30 tablet 5   • Fexofenadine HCl (ALLEGRA ALLERGY PO) Take by mouth as needed  (Patient not taking: Reported on 2/26/2023)     • Probiotic Product (Probiotic-10) CHEW Chew in the morning (Patient not taking: Reported on 2/26/2023)       No current facility-administered medications for this visit. Allergies: Allergies   Allergen Reactions   • Other Allergic Rhinitis     seasonal      Physical Exam:     /94 (BP Location: Left arm, Patient Position: Sitting, Cuff Size: Large)   Pulse 86   Temp 97.5 °F (36.4 °C)   Ht 5' 6" (1.676 m)   Wt 73.3 kg (161 lb 8 oz)   SpO2 96%   BMI 26.07 kg/m²     Physical Exam  Vitals and nursing note reviewed. Constitutional:       Appearance: Normal appearance. He is well-developed. HENT:      Head: Normocephalic and atraumatic. Eyes:      Extraocular Movements: Extraocular movements intact. Pupils: Pupils are equal, round, and reactive to light. Cardiovascular:      Rate and Rhythm: Normal rate and regular rhythm. Pulmonary:      Effort: Pulmonary effort is normal.      Breath sounds: Normal breath sounds. Abdominal:      General: Bowel sounds are normal.      Palpations: Abdomen is soft. Musculoskeletal:      Cervical back: Normal range of motion. Skin:     General: Skin is warm and dry. Neurological:      General: No focal deficit present. Mental Status: He is alert and oriented to person, place, and time.    Psychiatric:         Mood and Affect: Mood normal.         Speech: Speech normal.         Behavior: Behavior normal.          Julian Zamora MD  [facility] [address]

## 2023-09-12 ENCOUNTER — APPOINTMENT (OUTPATIENT)
Dept: LAB | Facility: CLINIC | Age: 53
End: 2023-09-12
Payer: COMMERCIAL

## 2023-09-12 DIAGNOSIS — R73.01 IFG (IMPAIRED FASTING GLUCOSE): ICD-10-CM

## 2023-09-12 DIAGNOSIS — E78.00 PURE HYPERCHOLESTEROLEMIA: ICD-10-CM

## 2023-09-12 DIAGNOSIS — Z12.5 SCREENING FOR PROSTATE CANCER: ICD-10-CM

## 2023-09-12 DIAGNOSIS — R03.0 ELEVATED BLOOD PRESSURE READING IN OFFICE WITHOUT DIAGNOSIS OF HYPERTENSION: ICD-10-CM

## 2023-09-12 LAB
ALBUMIN SERPL BCP-MCNC: 4.5 G/DL (ref 3.5–5)
ALP SERPL-CCNC: 53 U/L (ref 34–104)
ALT SERPL W P-5'-P-CCNC: 15 U/L (ref 7–52)
ANION GAP SERPL CALCULATED.3IONS-SCNC: 8 MMOL/L
AST SERPL W P-5'-P-CCNC: 17 U/L (ref 13–39)
BILIRUB SERPL-MCNC: 0.43 MG/DL (ref 0.2–1)
BUN SERPL-MCNC: 9 MG/DL (ref 5–25)
CALCIUM SERPL-MCNC: 9.5 MG/DL (ref 8.4–10.2)
CHLORIDE SERPL-SCNC: 104 MMOL/L (ref 96–108)
CHOLEST SERPL-MCNC: 259 MG/DL
CO2 SERPL-SCNC: 27 MMOL/L (ref 21–32)
CREAT SERPL-MCNC: 0.91 MG/DL (ref 0.6–1.3)
ERYTHROCYTE [DISTWIDTH] IN BLOOD BY AUTOMATED COUNT: 12.7 % (ref 11.6–15.1)
EST. AVERAGE GLUCOSE BLD GHB EST-MCNC: 108 MG/DL
GFR SERPL CREATININE-BSD FRML MDRD: 95 ML/MIN/1.73SQ M
GLUCOSE P FAST SERPL-MCNC: 66 MG/DL (ref 65–99)
HBA1C MFR BLD: 5.4 %
HCT VFR BLD AUTO: 46.7 % (ref 36.5–49.3)
HDLC SERPL-MCNC: 46 MG/DL
HGB BLD-MCNC: 15.7 G/DL (ref 12–17)
MCH RBC QN AUTO: 32.4 PG (ref 26.8–34.3)
MCHC RBC AUTO-ENTMCNC: 33.6 G/DL (ref 31.4–37.4)
MCV RBC AUTO: 97 FL (ref 82–98)
NONHDLC SERPL-MCNC: 213 MG/DL
PLATELET # BLD AUTO: 242 THOUSANDS/UL (ref 149–390)
PMV BLD AUTO: 10 FL (ref 8.9–12.7)
POTASSIUM SERPL-SCNC: 4.6 MMOL/L (ref 3.5–5.3)
PROT SERPL-MCNC: 7.2 G/DL (ref 6.4–8.4)
PSA SERPL-MCNC: 0.79 NG/ML (ref 0–4)
RBC # BLD AUTO: 4.84 MILLION/UL (ref 3.88–5.62)
SODIUM SERPL-SCNC: 139 MMOL/L (ref 135–147)
TRIGL SERPL-MCNC: 511 MG/DL
WBC # BLD AUTO: 3.74 THOUSAND/UL (ref 4.31–10.16)

## 2023-09-12 PROCEDURE — 80053 COMPREHEN METABOLIC PANEL: CPT

## 2023-09-12 PROCEDURE — 80061 LIPID PANEL: CPT

## 2023-09-12 PROCEDURE — 36415 COLL VENOUS BLD VENIPUNCTURE: CPT

## 2023-09-12 PROCEDURE — G0103 PSA SCREENING: HCPCS

## 2023-09-12 PROCEDURE — 83036 HEMOGLOBIN GLYCOSYLATED A1C: CPT

## 2023-09-12 PROCEDURE — 85027 COMPLETE CBC AUTOMATED: CPT

## 2023-09-20 DIAGNOSIS — R79.89 ABNORMAL CBC: Primary | ICD-10-CM

## 2023-09-25 ENCOUNTER — TELEPHONE (OUTPATIENT)
Dept: GASTROENTEROLOGY | Facility: CLINIC | Age: 53
End: 2023-09-25

## 2023-09-25 NOTE — TELEPHONE ENCOUNTER
Scheduled date of colonoscopy (as of today): 12/1/23  Physician performing colonoscopy: Dr. Ezequiel Hubbard  [facility] colon[facility]  Bowel prep reviewed with patient: Nery Howard my chart   Instructions reviewed with patient by: ls  Clearances: n/a

## 2023-09-25 NOTE — TELEPHONE ENCOUNTER
09/25/23  Screened by: Ariel Go    Referring Provider Dr. Herve Mckinney: There is no height or weight on file to calculate BMI. Has patient been referred for a routine screening Colonoscopy? yes  Is the patient between 43-73 years old? yes      Previous Colonoscopy yes   If yes:    Date: recall    Facility:    Reason:      SCHEDULING STAFF: If the patient is between 39yrs-51yrs, please advise patient to confirm benefits/coverage with their insurance company for a routine screening colonoscopy, some insurance carriers will only cover at [address] [place] [address] or older. If the patient is over 66years old, please schedule an office visit. Does the patient want to see a Gastroenterologist prior to their procedure OR are they having any GI symptoms? no    Has the patient been hospitalized or had abdominal surgery in the past 6 months? no    Does the patient use supplemental oxygen? no    Does the patient take Coumadin, Lovenox, Plavix, Elliquis, Xarelto, or other blood thinning medication? no    Has the patient had a stroke, cardiac event, or stent placed in the past year? no    SCHEDULING STAFF: If patient answers NO to above questions, then schedule procedure. If patient answers YES to above questions, then schedule office appointment. If patient is between 45yrs - 49yrs, please advise patient that we will have to confirm benefits & coverage with their insurance company for a routine screening colonoscopy.

## 2023-10-03 ENCOUNTER — OFFICE VISIT (OUTPATIENT)
Dept: FAMILY MEDICINE CLINIC | Facility: CLINIC | Age: 53
End: 2023-10-03
Payer: COMMERCIAL

## 2023-10-03 VITALS
DIASTOLIC BLOOD PRESSURE: 82 MMHG | HEART RATE: 81 BPM | SYSTOLIC BLOOD PRESSURE: 134 MMHG | BODY MASS INDEX: 26.68 KG/M2 | OXYGEN SATURATION: 96 % | WEIGHT: 166 LBS | TEMPERATURE: 97.4 F | HEIGHT: 66 IN | RESPIRATION RATE: 18 BRPM

## 2023-10-03 DIAGNOSIS — Z23 ENCOUNTER FOR IMMUNIZATION: ICD-10-CM

## 2023-10-03 DIAGNOSIS — F41.9 ANXIETY: ICD-10-CM

## 2023-10-03 DIAGNOSIS — R00.2 PALPITATIONS: ICD-10-CM

## 2023-10-03 DIAGNOSIS — M77.12 LATERAL EPICONDYLITIS OF LEFT ELBOW: ICD-10-CM

## 2023-10-03 DIAGNOSIS — E78.2 MIXED HYPERLIPIDEMIA: Primary | ICD-10-CM

## 2023-10-03 PROCEDURE — 99214 OFFICE O/P EST MOD 30 MIN: CPT | Performed by: FAMILY MEDICINE

## 2023-10-03 PROCEDURE — 90472 IMMUNIZATION ADMIN EACH ADD: CPT | Performed by: FAMILY MEDICINE

## 2023-10-03 PROCEDURE — 90686 IIV4 VACC NO PRSV 0.5 ML IM: CPT | Performed by: FAMILY MEDICINE

## 2023-10-03 PROCEDURE — 90471 IMMUNIZATION ADMIN: CPT | Performed by: FAMILY MEDICINE

## 2023-10-03 PROCEDURE — 90677 PCV20 VACCINE IM: CPT | Performed by: FAMILY MEDICINE

## 2023-10-03 RX ORDER — PROPRANOLOL HYDROCHLORIDE 20 MG/1
20 TABLET ORAL 2 TIMES DAILY
Qty: 180 TABLET | Refills: 0 | Status: SHIPPED | OUTPATIENT
Start: 2023-10-03

## 2023-10-03 RX ORDER — ROSUVASTATIN CALCIUM 10 MG/1
10 TABLET, COATED ORAL DAILY
Qty: 90 TABLET | Refills: 3 | Status: SHIPPED | OUTPATIENT
Start: 2023-10-03

## 2023-10-03 NOTE — ASSESSMENT & PLAN NOTE
The 10-year ASCVD risk score (Valarie RICE, et al., 2019) is: 17%    Values used to calculate the score:      Age: 48 years      Sex: Male      Is Non- : No      Diabetic: No      Tobacco smoker: Yes      Systolic Blood Pressure: 475 mmHg      Is BP treated: Yes      HDL Cholesterol: 46 mg/dL      Total Cholesterol: 259 mg/dL    Start patient on rosuvastatin 10 mg daily at nighttime for mixed hyperlipidemia. Patient is at risk for pancreatitis based on alcohol intake and elevated triglycerides. Continue with fish oils daily. Repeat lipid panel in 6 months.

## 2023-10-03 NOTE — ASSESSMENT & PLAN NOTE
Lab Results   Component Value Date    WBC 3.74 (L) 09/12/2023    HGB 15.7 09/12/2023    HCT 46.7 09/12/2023    MCV 97 09/12/2023     09/12/2023     Patient will repeat CBC in 2 weeks. Last low white count was in 2020. CBC with differential ordered.

## 2023-10-03 NOTE — PROGRESS NOTES
Name: Noemi Ariza      : 1970      MRN: [id number]  Encounter Provider: Kamran Van MD  Encounter Date: 10/3/2023   Encounter department: [address] [place] [address]     1. Mixed hyperlipidemia  Assessment & Plan:  The 10-year ASCVD risk score (Valarie RICE, et al., 2019) is: 17%    Values used to calculate the score:      Age: 48 years      Sex: Male      Is Non- : No      Diabetic: No      Tobacco smoker: Yes      Systolic Blood Pressure: 348 mmHg      Is BP treated: Yes      HDL Cholesterol: 46 mg/dL      Total Cholesterol: 259 mg/dL    Start patient on rosuvastatin 10 mg daily at nighttime for mixed hyperlipidemia. Patient is at risk for pancreatitis based on alcohol intake and elevated triglycerides. Continue with fish oils daily. Repeat lipid panel in 6 months. Orders:  -     rosuvastatin (CRESTOR) 10 MG tablet; Take 1 tablet (10 mg total) by mouth daily  -     Lipid panel; Future; Expected date: 2024    2. Anxiety  -     propranolol (INDERAL) 20 mg tablet; Take 1 tablet (20 mg total) by mouth 2 (two) times a day    3. Palpitations  -     propranolol (INDERAL) 20 mg tablet; Take 1 tablet (20 mg total) by mouth 2 (two) times a day    4. Encounter for immunization  -     Pneumococcal Conjugate Vaccine 20-valent (Pcv20)  -     influenza vaccine, quadrivalent, 0.5 mL, preservative-free, for adult and pediatric patients 6 mos+ (AFLURIA, FLUARIX, FLULAVAL, FLUZONE)    5. Lateral epicondylitis of left elbow  Assessment & Plan:  Continue with ice and Voltaren         Orders above. Continue current medications. 6-month follow-up. Subjective      Follow up today     Working on cutting back alcohol intake. Down to 5 daily. Has cut out hotdogs and chips. Started making a daily vegetable platter that he can snack on throughout the day. Overall doing well. Review of Systems   Constitutional: Negative for fatigue and fever.    HENT: Negative for sore throat. Eyes: Negative for visual disturbance. Respiratory: Negative for cough, chest tightness and shortness of breath. Cardiovascular: Negative for chest pain, palpitations and leg swelling. Gastrointestinal: Negative for abdominal pain, constipation, diarrhea and nausea. Endocrine: Negative for cold intolerance and heat intolerance. Genitourinary: Negative for flank pain. Musculoskeletal: Negative for back pain and neck pain. Skin: Negative for rash. Neurological: Negative for headaches. Psychiatric/Behavioral: Negative for behavioral problems and confusion. Current Outpatient Medications on File Prior to Visit   Medication Sig   • buPROPion (Wellbutrin XL) 150 mg 24 hr tablet Take 1 tablet (150 mg total) by mouth every morning   • Fexofenadine HCl (ALLEGRA ALLERGY PO) Take by mouth as needed   • multivitamin (THERAGRAN) TABS Take 1 tablet by mouth daily   • omeprazole (PriLOSEC) 20 mg delayed release capsule Take 1 capsule (20 mg total) by mouth daily for 14 days   • Probiotic Product (Probiotic-10) CHEW Chew in the morning   • tadalafil (CIALIS) 20 MG tablet Take 1 tablet (20 mg total) by mouth daily as needed for erectile dysfunction   • [DISCONTINUED] propranolol (INDERAL) 20 mg tablet Take 1 tablet (20 mg total) by mouth 2 (two) times a day       Objective     /82 (BP Location: Left arm, Patient Position: Sitting, Cuff Size: Standard)   Pulse 81   Temp (!) 97.4 °F (36.3 °C) (Tympanic)   Resp 18   Ht 5' 6" (1.676 m)   Wt 75.3 kg (166 lb)   SpO2 96%   BMI 26.79 kg/m²     Physical Exam  Vitals and nursing note reviewed. Constitutional:       Appearance: He is well-developed. HENT:      Head: Normocephalic and atraumatic. Cardiovascular:      Rate and Rhythm: Normal rate and regular rhythm. Pulmonary:      Effort: Pulmonary effort is normal.      Breath sounds: Normal breath sounds. Neurological:      General: No focal deficit present. Mental Status: He is alert.    Psychiatric:         Mood and Affect: Mood normal.         Behavior: Behavior normal.       Belem Bruce MD

## 2023-10-19 ENCOUNTER — APPOINTMENT (OUTPATIENT)
Dept: LAB | Facility: CLINIC | Age: 53
End: 2023-10-19
Payer: COMMERCIAL

## 2023-10-19 DIAGNOSIS — R79.89 ABNORMAL CBC: ICD-10-CM

## 2023-10-19 LAB
BASOPHILS # BLD AUTO: 0.06 THOUSANDS/ÂΜL (ref 0–0.1)
BASOPHILS NFR BLD AUTO: 1 % (ref 0–1)
EOSINOPHIL # BLD AUTO: 0.14 THOUSAND/ÂΜL (ref 0–0.61)
EOSINOPHIL NFR BLD AUTO: 3 % (ref 0–6)
ERYTHROCYTE [DISTWIDTH] IN BLOOD BY AUTOMATED COUNT: 12.5 % (ref 11.6–15.1)
HCT VFR BLD AUTO: 44.5 % (ref 36.5–49.3)
HGB BLD-MCNC: 15.3 G/DL (ref 12–17)
IMM GRANULOCYTES # BLD AUTO: 0.06 THOUSAND/UL (ref 0–0.2)
IMM GRANULOCYTES NFR BLD AUTO: 1 % (ref 0–2)
LYMPHOCYTES # BLD AUTO: 1.4 THOUSANDS/ÂΜL (ref 0.6–4.47)
LYMPHOCYTES NFR BLD AUTO: 29 % (ref 14–44)
MCH RBC QN AUTO: 33 PG (ref 26.8–34.3)
MCHC RBC AUTO-ENTMCNC: 34.4 G/DL (ref 31.4–37.4)
MCV RBC AUTO: 96 FL (ref 82–98)
MONOCYTES # BLD AUTO: 0.55 THOUSAND/ÂΜL (ref 0.17–1.22)
MONOCYTES NFR BLD AUTO: 12 % (ref 4–12)
NEUTROPHILS # BLD AUTO: 2.56 THOUSANDS/ÂΜL (ref 1.85–7.62)
NEUTS SEG NFR BLD AUTO: 54 % (ref 43–75)
NRBC BLD AUTO-RTO: 0 /100 WBCS
PLATELET # BLD AUTO: 228 THOUSANDS/UL (ref 149–390)
PMV BLD AUTO: 9.8 FL (ref 8.9–12.7)
RBC # BLD AUTO: 4.64 MILLION/UL (ref 3.88–5.62)
WBC # BLD AUTO: 4.77 THOUSAND/UL (ref 4.31–10.16)

## 2023-10-19 PROCEDURE — 85025 COMPLETE CBC W/AUTO DIFF WBC: CPT

## 2023-10-19 PROCEDURE — 36415 COLL VENOUS BLD VENIPUNCTURE: CPT

## 2023-10-23 ENCOUNTER — TELEPHONE (OUTPATIENT)
Age: 53
End: 2023-10-23

## 2023-10-23 NOTE — TELEPHONE ENCOUNTER
Bashir Perkins saw Dr. Toña Alamo  Lateral epicondylitis of left elbow  Assessment & Plan:  Continue with ice and Voltaren     Patient stating nothing is helping. His elbow feels fine when he extends it but if he tries drinking coffee or doing anthing with it bent, it stiffens up and hurts. Patient would like to know what the next step should be for him? Please call patient back.   Thank You

## 2023-10-24 ENCOUNTER — RA CDI HCC (OUTPATIENT)
Dept: OTHER | Facility: HOSPITAL | Age: 53
End: 2023-10-24

## 2023-10-24 NOTE — PROGRESS NOTES
[facility] coding opportunities       Chart reviewed, no opportunity found: CHART REVIEWED, NO OPPORTUNITY FOUND        Patients Insurance        Commercial Insurance: Philip Solo

## 2023-10-31 ENCOUNTER — PROCEDURE VISIT (OUTPATIENT)
Dept: FAMILY MEDICINE CLINIC | Facility: CLINIC | Age: 53
End: 2023-10-31

## 2023-10-31 VITALS
WEIGHT: 168 LBS | SYSTOLIC BLOOD PRESSURE: 138 MMHG | HEART RATE: 71 BPM | DIASTOLIC BLOOD PRESSURE: 92 MMHG | HEIGHT: 66 IN | RESPIRATION RATE: 16 BRPM | OXYGEN SATURATION: 97 % | BODY MASS INDEX: 27 KG/M2 | TEMPERATURE: 97.2 F

## 2023-10-31 DIAGNOSIS — E78.2 MIXED HYPERLIPIDEMIA: ICD-10-CM

## 2023-10-31 DIAGNOSIS — M77.12 LATERAL EPICONDYLITIS OF LEFT ELBOW: Primary | ICD-10-CM

## 2023-10-31 RX ORDER — NAPROXEN 500 MG/1
500 TABLET ORAL 2 TIMES DAILY PRN
Qty: 60 TABLET | Refills: 0 | Status: SHIPPED | OUTPATIENT
Start: 2023-10-31

## 2023-10-31 RX ORDER — METHYLPREDNISOLONE ACETATE 40 MG/ML
40 INJECTION, SUSPENSION INTRA-ARTICULAR; INTRALESIONAL; INTRAMUSCULAR; SOFT TISSUE ONCE
Status: COMPLETED | OUTPATIENT
Start: 2023-10-31 | End: 2023-10-31

## 2023-10-31 RX ADMIN — METHYLPREDNISOLONE ACETATE 40 MG: 40 INJECTION, SUSPENSION INTRA-ARTICULAR; INTRALESIONAL; INTRAMUSCULAR; SOFT TISSUE at 12:31

## 2023-10-31 NOTE — PROGRESS NOTES
Name: Frances Hernandez      : 1970      MRN: [id number]  Encounter Provider: Quinn Cadena MD  Encounter Date: 10/31/2023   Encounter department: [address] [place] [address]     1. Lateral epicondylitis of left elbow  -     naproxen (Naprosyn) 500 mg tablet; Take 1 tablet (500 mg total) by mouth 2 (two) times a day as needed for moderate pain  -     Medium joint arthrocentesis: L elbow  -     methylPREDNISolone acetate (DEPO-MEDROL) injection 40 mg    2. Mixed hyperlipidemia  -     Lipid panel; Future    He has pain over the lateral condyle. Difficulty swinging a hammer. Tried tennis elbow strap which provided no relief. We will provide patient with a steroid injection today. If no response to steroid injection would follow-up with sports medicine to talk about further treatment options such as PRP. Can take naproxen 500 mg twice daily as needed for pain. Patient understands and agrees with the plan. Medium joint arthrocentesis: L elbow  Universal Protocol:  Patient identity confirmed: verbally with patient  Supporting Documentation  Indications: pain   Procedure Details  Location: elbow - L elbow  Preparation: Patient was prepped and draped in the usual sterile fashion  Needle size: 25 G  Ultrasound guidance: no    Patient tolerance: patient tolerated the procedure well with no immediate complications  Dressing:  Sterile dressing applied               Subjective      Left elbow pain. No response to tennis elbow strap. Review of Systems   Musculoskeletal:         Left elbow pain. Difficulty lifting hammer.   Tingling down in that hand       Current Outpatient Medications on File Prior to Visit   Medication Sig   • buPROPion (Wellbutrin XL) 150 mg 24 hr tablet Take 1 tablet (150 mg total) by mouth every morning   • multivitamin (THERAGRAN) TABS Take 1 tablet by mouth daily   • omeprazole (PriLOSEC) 20 mg delayed release capsule Take 1 capsule (20 mg total) by mouth daily for 14 days   • Probiotic Product (Probiotic-10) CHEW Chew in the morning   • propranolol (INDERAL) 20 mg tablet Take 1 tablet (20 mg total) by mouth 2 (two) times a day   • rosuvastatin (CRESTOR) 10 MG tablet Take 1 tablet (10 mg total) by mouth daily   • tadalafil (CIALIS) 20 MG tablet Take 1 tablet (20 mg total) by mouth daily as needed for erectile dysfunction   • Fexofenadine HCl (ALLEGRA ALLERGY PO) Take by mouth as needed (Patient not taking: Reported on 10/31/2023)       Objective     /92 (BP Location: Left arm, Patient Position: Sitting, Cuff Size: Large)   Pulse 71   Temp (!) 97.2 °F (36.2 °C)   Resp 16   Ht 5' 6" (1.676 m)   Wt 76.2 kg (168 lb)   SpO2 97%   BMI 27.12 kg/m²     Physical Exam  Vitals and nursing note reviewed. Constitutional:       Appearance: Normal appearance. Pulmonary:      Effort: No respiratory distress. Musculoskeletal:         General: Tenderness (Left lateral epicondyle) present. Neurological:      Mental Status: He is alert.    Psychiatric:         Mood and Affect: Mood normal.         Behavior: Behavior normal.       Anh Figueroa MD

## 2023-11-07 ENCOUNTER — PATIENT MESSAGE (OUTPATIENT)
Dept: FAMILY MEDICINE CLINIC | Facility: CLINIC | Age: 53
End: 2023-11-07

## 2023-11-08 ENCOUNTER — TELEPHONE (OUTPATIENT)
Dept: FAMILY MEDICINE CLINIC | Facility: CLINIC | Age: 53
End: 2023-11-08

## 2023-11-08 ENCOUNTER — TELEPHONE (OUTPATIENT)
Age: 53
End: 2023-11-08

## 2023-11-08 NOTE — TELEPHONE ENCOUNTER
Left message on identified voice mail , that we do not have a record of him receiving Tdap vaccine. He will need vaccine.

## 2023-11-17 ENCOUNTER — ANESTHESIA EVENT (OUTPATIENT)
Dept: ANESTHESIOLOGY | Facility: HOSPITAL | Age: 53
End: 2023-11-17

## 2023-11-17 ENCOUNTER — ANESTHESIA (OUTPATIENT)
Dept: ANESTHESIOLOGY | Facility: HOSPITAL | Age: 53
End: 2023-11-17

## 2023-11-21 DIAGNOSIS — F41.9 ANXIETY: ICD-10-CM

## 2023-11-21 DIAGNOSIS — Z12.11 COLON CANCER SCREENING: Primary | ICD-10-CM

## 2023-11-21 DIAGNOSIS — N52.8 OTHER MALE ERECTILE DYSFUNCTION: ICD-10-CM

## 2023-11-21 RX ORDER — BUPROPION HYDROCHLORIDE 150 MG/1
150 TABLET ORAL EVERY MORNING
Qty: 90 TABLET | Refills: 0 | Status: SHIPPED | OUTPATIENT
Start: 2023-11-21

## 2023-11-21 RX ORDER — TADALAFIL 20 MG/1
20 TABLET ORAL DAILY PRN
Qty: 30 TABLET | Refills: 0 | Status: SHIPPED | OUTPATIENT
Start: 2023-11-21 | End: 2024-05-19

## 2023-11-21 RX ORDER — SODIUM PICOSULFATE, MAGNESIUM OXIDE, AND ANHYDROUS CITRIC ACID 12; 3.5; 1 G/175ML; G/175ML; MG/175ML
LIQUID ORAL
Qty: 350 ML | Refills: 0 | Status: SHIPPED | OUTPATIENT
Start: 2023-11-21 | End: 2023-12-01 | Stop reason: ALTCHOICE

## 2023-11-28 ENCOUNTER — TELEPHONE (OUTPATIENT)
Dept: GASTROENTEROLOGY | Facility: CLINIC | Age: 53
End: 2023-11-28

## 2023-11-28 NOTE — TELEPHONE ENCOUNTER
Spoke to pt whom informed that he is ok with Dr. Genaro Haines doing his procedure instead of Dr. Don Laguna since he will not be in the office.

## 2023-12-01 ENCOUNTER — ANESTHESIA (OUTPATIENT)
Dept: GASTROENTEROLOGY | Facility: AMBULARY SURGERY CENTER | Age: 53
End: 2023-12-01

## 2023-12-01 ENCOUNTER — HOSPITAL ENCOUNTER (OUTPATIENT)
Dept: GASTROENTEROLOGY | Facility: AMBULARY SURGERY CENTER | Age: 53
Setting detail: OUTPATIENT SURGERY
End: 2023-12-01
Attending: INTERNAL MEDICINE
Payer: COMMERCIAL

## 2023-12-01 ENCOUNTER — ANESTHESIA EVENT (OUTPATIENT)
Dept: GASTROENTEROLOGY | Facility: AMBULARY SURGERY CENTER | Age: 53
End: 2023-12-01

## 2023-12-01 VITALS
OXYGEN SATURATION: 96 % | RESPIRATION RATE: 18 BRPM | DIASTOLIC BLOOD PRESSURE: 78 MMHG | SYSTOLIC BLOOD PRESSURE: 116 MMHG | BODY MASS INDEX: 28.16 KG/M2 | TEMPERATURE: 98 F | WEIGHT: 169 LBS | HEIGHT: 65 IN | HEART RATE: 68 BPM

## 2023-12-01 DIAGNOSIS — Z86.010 HX OF COLONIC POLYPS: ICD-10-CM

## 2023-12-01 PROCEDURE — 88305 TISSUE EXAM BY PATHOLOGIST: CPT | Performed by: STUDENT IN AN ORGANIZED HEALTH CARE EDUCATION/TRAINING PROGRAM

## 2023-12-01 PROCEDURE — 45385 COLONOSCOPY W/LESION REMOVAL: CPT | Performed by: INTERNAL MEDICINE

## 2023-12-01 RX ORDER — SODIUM CHLORIDE, SODIUM LACTATE, POTASSIUM CHLORIDE, CALCIUM CHLORIDE 600; 310; 30; 20 MG/100ML; MG/100ML; MG/100ML; MG/100ML
INJECTION, SOLUTION INTRAVENOUS CONTINUOUS PRN
Status: DISCONTINUED | OUTPATIENT
Start: 2023-12-01 | End: 2023-12-01

## 2023-12-01 RX ORDER — PROPOFOL 10 MG/ML
INJECTION, EMULSION INTRAVENOUS CONTINUOUS PRN
Status: DISCONTINUED | OUTPATIENT
Start: 2023-12-01 | End: 2023-12-01

## 2023-12-01 RX ORDER — LIDOCAINE HYDROCHLORIDE 20 MG/ML
INJECTION, SOLUTION EPIDURAL; INFILTRATION; INTRACAUDAL; PERINEURAL AS NEEDED
Status: DISCONTINUED | OUTPATIENT
Start: 2023-12-01 | End: 2023-12-01

## 2023-12-01 RX ORDER — PROPOFOL 10 MG/ML
INJECTION, EMULSION INTRAVENOUS AS NEEDED
Status: DISCONTINUED | OUTPATIENT
Start: 2023-12-01 | End: 2023-12-01

## 2023-12-01 RX ADMIN — PROPOFOL 50 MG: 10 INJECTION, EMULSION INTRAVENOUS at 13:40

## 2023-12-01 RX ADMIN — SODIUM CHLORIDE, SODIUM LACTATE, POTASSIUM CHLORIDE, AND CALCIUM CHLORIDE: .6; .31; .03; .02 INJECTION, SOLUTION INTRAVENOUS at 13:35

## 2023-12-01 RX ADMIN — PROPOFOL 150 MCG/KG/MIN: 10 INJECTION, EMULSION INTRAVENOUS at 13:42

## 2023-12-01 RX ADMIN — PROPOFOL 50 MG: 10 INJECTION, EMULSION INTRAVENOUS at 13:42

## 2023-12-01 RX ADMIN — PROPOFOL 100 MG: 10 INJECTION, EMULSION INTRAVENOUS at 13:38

## 2023-12-01 RX ADMIN — LIDOCAINE HYDROCHLORIDE 80 MG: 20 INJECTION, SOLUTION EPIDURAL; INFILTRATION; INTRACAUDAL; PERINEURAL at 13:38

## 2023-12-01 NOTE — H&P
History and Physical -  [facility]  Layton Bishop 48 y.o. male MRN: [id number]        HPI: Layton Bishop is a 48y.o. year old male who presents for colon cancer screening in the setting of history of colon polyps. REVIEW OF SYSTEMS: Per the HPI, and otherwise unremarkable. Historical Information   Past Medical History:   Diagnosis Date    Alcoholic ([address])     Allergic rhinitis     Colon polyp     Depression     Palpitation      Past Surgical History:   Procedure Laterality Date    COLONOSCOPY      VASECTOMY       Social History   Social History     Substance and Sexual Activity   Alcohol Use Not Currently    Alcohol/week: 35.0 standard drinks of alcohol    Types: 35 Cans of beer per week     Social History     Substance and Sexual Activity   Drug Use No     Social History     Tobacco Use   Smoking Status Some Days    Types: Cigars   Smokeless Tobacco Never   Tobacco Comments    cigars 1 daily     Family History   Problem Relation Age of Onset    No Known Problems Mother     No Known Problems Father        Meds/Allergies       Current Outpatient Medications:     buPROPion (Wellbutrin XL) 150 mg 24 hr tablet    multivitamin (THERAGRAN) TABS    omeprazole (PriLOSEC) 20 mg delayed release capsule    Probiotic Product (Probiotic-10) CHEW    propranolol (INDERAL) 20 mg tablet    rosuvastatin (CRESTOR) 10 MG tablet    tadalafil (CIALIS) 20 MG tablet    Fexofenadine HCl (ALLEGRA ALLERGY PO)    Allergies   Allergen Reactions    Other Allergic Rhinitis     seasonal       Objective     Ht 5' 5" (1.651 m)   Wt 76.7 kg (169 lb)   BMI 28.12 kg/m²       PHYSICAL EXAM    Gen: NAD  Head: NCAT  CV: RRR  CHEST: Clear  ABD: soft, NT/ND  EXT: no edema      ASSESSMENT/PLAN:  This is a 48y.o. year old male here for colonoscopy, and he is stable and optimized for his procedure.

## 2023-12-01 NOTE — ANESTHESIA PREPROCEDURE EVALUATION
Procedure:  COLONOSCOPY    Relevant Problems   CARDIO   (+) Mixed hyperlipidemia      MUSCULOSKELETAL   (+) Lateral epicondylitis of left elbow      NEURO/PSYCH   (+) Anxiety   (+) Reactive depression        Physical Exam    Airway    Mallampati score: II  TM Distance: >3 FB  Neck ROM: full     Dental   No notable dental hx     Cardiovascular  Rhythm: regular, Rate: normal, Cardiovascular exam normal    Pulmonary  Pulmonary exam normal Breath sounds clear to auscultation    Other Findings        Anesthesia Plan  ASA Score- 2     Anesthesia Type- IV sedation with anesthesia with ASA Monitors. Additional Monitors:     Airway Plan:     Comment: Discussed risks/benefits, including medication reactions, awareness, aspiration, and serious/life threatening complications. Plan to maintain native airway with IVGA, monitored with EtCO2. Plan Factors-Exercise tolerance (METS): >4 METS. Patient summary reviewed. Patient instructed to abstain from smoking on day of procedure. Patient did not smoke on day of surgery. Induction- intravenous. Postoperative Plan- Plan for postoperative opioid use. Planned trial extubation    Informed Consent- Anesthetic plan and risks discussed with patient. I personally reviewed this patient with the CRNA. Discussed and agreed on the Anesthesia Plan with the CRNA. Chandrika Lovett no

## 2023-12-01 NOTE — ANESTHESIA POSTPROCEDURE EVALUATION
Post-Op Assessment Note    CV Status:  Stable    Pain management: adequate       Mental Status:  Alert and awake   Hydration Status:  Euvolemic   PONV Controlled:  Controlled   Airway Patency:  Patent     Post Op Vitals Reviewed: Yes    No anethesia notable event occurred.     Staff: CRNA               BP   103/64   Temp  98   Pulse  77   Resp   16   SpO2   100%

## 2023-12-05 PROCEDURE — 88305 TISSUE EXAM BY PATHOLOGIST: CPT | Performed by: STUDENT IN AN ORGANIZED HEALTH CARE EDUCATION/TRAINING PROGRAM

## 2023-12-26 DIAGNOSIS — F41.9 ANXIETY: ICD-10-CM

## 2023-12-27 RX ORDER — BUPROPION HYDROCHLORIDE 150 MG/1
150 TABLET ORAL EVERY MORNING
Qty: 90 TABLET | Refills: 0 | Status: SHIPPED | OUTPATIENT
Start: 2023-12-27

## 2024-01-09 NOTE — PATIENT COMMUNICATION
Pt calling back because he has not heard from anyone in regards to getting the TDAP- looks like records do not show one in the last 5 years.  His daughter is pregnant and would like him to have one done.  He also stated his left elbow is painful again and is wondering if he can get another shot in that- please advise and schedule accordingly.

## 2024-01-10 ENCOUNTER — TELEPHONE (OUTPATIENT)
Dept: FAMILY MEDICINE CLINIC | Facility: CLINIC | Age: 54
End: 2024-01-10

## 2024-01-10 NOTE — TELEPHONE ENCOUNTER
Courtney Moses AGUSTIN    1/9/24 12:44 PM  Note     Pt calling back because he has not heard from anyone in regards to getting the TDAP- looks like records do not show one in the last 5 years.  His daughter is pregnant and would like him to have one done.  He also stated his left elbow is painful again and is wondering if he can get another shot in that- please advise and schedule accordingly.       Called patient to schedule with  for vaccine and elbow no response left message to call the office back

## 2024-01-10 NOTE — TELEPHONE ENCOUNTER
Regarding: follow up  ----- Message from Courtney Lopes sent at 1/9/2024 12:44 PM EST -----       ----- Message from Brett Abdi to Gurpreet Benedict MD sent at 11/7/2023  9:44 AM -----   Hello Doctor,    The shot in my left elbow worked perfectly.  Thank you.    My pregnant daughter wants me to have a TDAP shot within the last 5 years.  Do I?

## 2024-01-11 NOTE — TELEPHONE ENCOUNTER
Patient only wanted to schedule for TDAP at this time. He will call back when he decides about his trigger point injection.   I scheduled him as [place] to get his TDAP.

## 2024-01-12 ENCOUNTER — CLINICAL SUPPORT (OUTPATIENT)
Dept: FAMILY MEDICINE CLINIC | Facility: CLINIC | Age: 54
End: 2024-01-12
Payer: COMMERCIAL

## 2024-01-12 DIAGNOSIS — Z23 ENCOUNTER FOR IMMUNIZATION: Primary | ICD-10-CM

## 2024-01-12 PROCEDURE — 90715 TDAP VACCINE 7 YRS/> IM: CPT | Performed by: FAMILY MEDICINE

## 2024-01-12 PROCEDURE — 90471 IMMUNIZATION ADMIN: CPT | Performed by: FAMILY MEDICINE

## 2024-03-04 ENCOUNTER — RA CDI HCC (OUTPATIENT)
Dept: OTHER | Facility: HOSPITAL | Age: 54
End: 2024-03-04

## 2024-03-04 NOTE — PROGRESS NOTES
HCC coding opportunities       Chart reviewed, no opportunity found: CHART REVIEWED, NO OPPORTUNITY FOUND      This is a reminder to address (resolve/update/assess) ALL HCC (risk adjustment) codes as found on active problem list for 2024 as patient scores reset to zero GAMALIEL.  Also, just a reminder to please review and assess all other chronic conditions for 2024  Patients Insurance        Commercial Insurance: [employer]

## 2024-03-05 ENCOUNTER — APPOINTMENT (OUTPATIENT)
Dept: LAB | Facility: CLINIC | Age: 54
End: 2024-03-05
Payer: COMMERCIAL

## 2024-03-05 DIAGNOSIS — E78.2 MIXED HYPERLIPIDEMIA: ICD-10-CM

## 2024-03-05 LAB
CHOLEST SERPL-MCNC: 155 MG/DL
HDLC SERPL-MCNC: 52 MG/DL
LDLC SERPL CALC-MCNC: 80 MG/DL (ref 0–100)
NONHDLC SERPL-MCNC: 103 MG/DL
TRIGL SERPL-MCNC: 117 MG/DL

## 2024-03-05 PROCEDURE — 80061 LIPID PANEL: CPT

## 2024-03-05 PROCEDURE — 36415 COLL VENOUS BLD VENIPUNCTURE: CPT

## 2024-03-12 ENCOUNTER — OFFICE VISIT (OUTPATIENT)
Dept: FAMILY MEDICINE CLINIC | Facility: CLINIC | Age: 54
End: 2024-03-12
Payer: COMMERCIAL

## 2024-03-12 VITALS
WEIGHT: 171 LBS | SYSTOLIC BLOOD PRESSURE: 122 MMHG | BODY MASS INDEX: 28.49 KG/M2 | HEART RATE: 100 BPM | RESPIRATION RATE: 16 BRPM | HEIGHT: 65 IN | OXYGEN SATURATION: 95 % | DIASTOLIC BLOOD PRESSURE: 86 MMHG | TEMPERATURE: 98.2 F

## 2024-03-12 DIAGNOSIS — K30 FUNCTIONAL DYSPEPSIA: ICD-10-CM

## 2024-03-12 DIAGNOSIS — M77.12 LATERAL EPICONDYLITIS OF LEFT ELBOW: ICD-10-CM

## 2024-03-12 DIAGNOSIS — F10.90 CHRONIC ALCOHOL USE: ICD-10-CM

## 2024-03-12 DIAGNOSIS — E78.2 MIXED HYPERLIPIDEMIA: Primary | ICD-10-CM

## 2024-03-12 PROCEDURE — 99214 OFFICE O/P EST MOD 30 MIN: CPT | Performed by: FAMILY MEDICINE

## 2024-03-12 NOTE — PROGRESS NOTES
Name: Frank Abdi      : 1970      MRN: [id number]  Encounter Provider: Gurpreet Benedict MD  Encounter Date: 3/12/2024   Encounter department: [facility]    Assessment & Plan     1. Mixed hyperlipidemia  Assessment & Plan:  The 10-year ASCVD risk score (Valarie RICE, et al., 2019) is: 7%    Values used to calculate the score:      Age: 53 years      Sex: Male      Is Non- : No      Diabetic: No      Tobacco smoker: Yes      Systolic Blood Pressure: 122 mmHg      Is BP treated: Yes      HDL Cholesterol: 52 mg/dL      Total Cholesterol: 155 mg/dL  Continue rosuvastatin 10 mg daily.  Repeat lipid panel.    Orders:  -     Lipid panel; Future    2. Lateral epicondylitis of left elbow  Assessment & Plan:  Improved with chiropractic care.  Continue Voltaren gel and ice.      3. Functional dyspepsia  Assessment & Plan:  Stable on omeprazole 20 mg daily      4. Chronic alcohol use  Assessment & Plan:  Currently drinks 6-8 beers per night.  Counseled on alcohol cessation.  Has gone to AA in the past.  Working on cutting back.    Orders:  -     Comprehensive metabolic panel; Future  -     CBC and differential; Future    Obtain lab work.  Follow-up annual physical       Subjective      Patient presents with:  Follow-up: 6 months.  Overall doing well.  Notes an improvement in elbow pain since starting chiropractic care.  Recently had a granddaughter.  Does continue to endorse drinking 6-8 beers per night this is unchanged.  He is aware he needs to cut back.  Has been tolerating new medications well.  Lipid panel improved.          Review of Systems   Constitutional:  Negative for activity change, fatigue and fever.   Eyes:  Negative for visual disturbance.   Respiratory:  Negative for shortness of breath.    Cardiovascular:  Negative for chest pain.   Gastrointestinal:  Negative for abdominal pain, constipation, diarrhea and nausea.   Endocrine: Negative for cold intolerance  "and heat intolerance.   Musculoskeletal:  Negative for back pain.   Skin:  Negative for rash.   Neurological:  Negative for headaches.   Psychiatric/Behavioral:  Negative for confusion.        Current Outpatient Medications on File Prior to Visit   Medication Sig   • buPROPion (WELLBUTRIN XL) 150 mg 24 hr tablet take 1 tablet by mouth every morning   • Fexofenadine HCl (ALLEGRA ALLERGY PO) Take by mouth as needed   • multivitamin (THERAGRAN) TABS Take 1 tablet by mouth daily   • omeprazole (PriLOSEC) 20 mg delayed release capsule Take 1 capsule (20 mg total) by mouth daily for 14 days   • Probiotic Product (Probiotic-10) CHEW Chew in the morning   • propranolol (INDERAL) 20 mg tablet Take 1 tablet (20 mg total) by mouth 2 (two) times a day   • rosuvastatin (CRESTOR) 10 MG tablet Take 1 tablet (10 mg total) by mouth daily   • tadalafil (CIALIS) 20 MG tablet Take 1 tablet (20 mg total) by mouth daily as needed for erectile dysfunction       Objective     /86 (BP Location: Left arm, Patient Position: Sitting, Cuff Size: Large)   Pulse 100   Temp 98.2 °F (36.8 °C)   Resp 16   Ht 5' 5\" (1.651 m)   Wt 77.6 kg (171 lb)   SpO2 95%   BMI 28.46 kg/m²     Physical Exam  Vitals and nursing note reviewed.   Constitutional:       Appearance: He is well-developed.   HENT:      Head: Normocephalic and atraumatic.   Cardiovascular:      Rate and Rhythm: Normal rate and regular rhythm.   Pulmonary:      Effort: Pulmonary effort is normal.      Breath sounds: Normal breath sounds.   Neurological:      General: No focal deficit present.      Mental Status: He is alert.   Psychiatric:         Mood and Affect: Mood normal.         Behavior: Behavior normal.       Gurpreet Benedict MD    "

## 2024-03-12 NOTE — ASSESSMENT & PLAN NOTE
The 10-year ASCVD risk score (Valarie RICE, et al., 2019) is: 7%    Values used to calculate the score:      Age: 53 years      Sex: Male      Is Non- : No      Diabetic: No      Tobacco smoker: Yes      Systolic Blood Pressure: 122 mmHg      Is BP treated: Yes      HDL Cholesterol: 52 mg/dL      Total Cholesterol: 155 mg/dL  Continue rosuvastatin 10 mg daily.  Repeat lipid panel.

## 2024-03-12 NOTE — ASSESSMENT & PLAN NOTE
Currently drinks 6-8 beers per night.  Counseled on alcohol cessation.  Has gone to AA in the past.  Working on cutting back.

## 2024-03-16 DIAGNOSIS — N52.8 OTHER MALE ERECTILE DYSFUNCTION: ICD-10-CM

## 2024-03-17 RX ORDER — TADALAFIL 20 MG/1
TABLET ORAL
Qty: 30 TABLET | Refills: 2 | Status: SHIPPED | OUTPATIENT
Start: 2024-03-17

## 2024-04-01 ENCOUNTER — TELEPHONE (OUTPATIENT)
Age: 54
End: 2024-04-01

## 2024-04-01 NOTE — TELEPHONE ENCOUNTER
Patient called to inquire about the balance he is carrying of 155.71  He has [employer] so he is wondering why there is a charge on his account

## 2024-05-14 DIAGNOSIS — F41.9 ANXIETY: ICD-10-CM

## 2024-05-15 RX ORDER — BUPROPION HYDROCHLORIDE 150 MG/1
150 TABLET ORAL EVERY MORNING
Qty: 90 TABLET | Refills: 1 | Status: SHIPPED | OUTPATIENT
Start: 2024-05-15

## 2024-06-15 DIAGNOSIS — F41.9 ANXIETY: ICD-10-CM

## 2024-06-15 DIAGNOSIS — R00.2 PALPITATIONS: ICD-10-CM

## 2024-06-15 RX ORDER — PROPRANOLOL HYDROCHLORIDE 20 MG/1
20 TABLET ORAL 2 TIMES DAILY
Qty: 180 TABLET | Refills: 1 | Status: SHIPPED | OUTPATIENT
Start: 2024-06-15

## 2024-06-20 DIAGNOSIS — N52.8 OTHER MALE ERECTILE DYSFUNCTION: ICD-10-CM

## 2024-06-20 RX ORDER — TADALAFIL 20 MG/1
TABLET ORAL
Qty: 30 TABLET | Refills: 5 | Status: SHIPPED | OUTPATIENT
Start: 2024-06-20

## 2024-08-21 ENCOUNTER — TELEPHONE (OUTPATIENT)
Dept: FAMILY MEDICINE CLINIC | Facility: CLINIC | Age: 54
End: 2024-08-21

## 2024-08-21 NOTE — TELEPHONE ENCOUNTER
Left message for patient to call back to reschedule 9/13/2024 appointment as the provider will not be in that day.

## 2024-09-03 DIAGNOSIS — E78.2 MIXED HYPERLIPIDEMIA: ICD-10-CM

## 2024-09-04 RX ORDER — ROSUVASTATIN CALCIUM 10 MG/1
10 TABLET, COATED ORAL DAILY
Qty: 90 TABLET | Refills: 1 | Status: SHIPPED | OUTPATIENT
Start: 2024-09-04

## 2024-09-12 ENCOUNTER — RA CDI HCC (OUTPATIENT)
Dept: OTHER | Facility: HOSPITAL | Age: 54
End: 2024-09-12

## 2024-09-16 ENCOUNTER — APPOINTMENT (OUTPATIENT)
Dept: LAB | Facility: CLINIC | Age: 54
End: 2024-09-16
Payer: COMMERCIAL

## 2024-09-16 DIAGNOSIS — E78.2 MIXED HYPERLIPIDEMIA: ICD-10-CM

## 2024-09-16 DIAGNOSIS — F10.90 CHRONIC ALCOHOL USE: ICD-10-CM

## 2024-09-16 LAB
ALBUMIN SERPL BCG-MCNC: 4.4 G/DL (ref 3.5–5)
ALP SERPL-CCNC: 51 U/L (ref 34–104)
ALT SERPL W P-5'-P-CCNC: 17 U/L (ref 7–52)
ANION GAP SERPL CALCULATED.3IONS-SCNC: 10 MMOL/L (ref 4–13)
AST SERPL W P-5'-P-CCNC: 21 U/L (ref 13–39)
BASOPHILS # BLD AUTO: 0.06 THOUSANDS/ΜL (ref 0–0.1)
BASOPHILS NFR BLD AUTO: 1 % (ref 0–1)
BILIRUB SERPL-MCNC: 0.46 MG/DL (ref 0.2–1)
BUN SERPL-MCNC: 9 MG/DL (ref 5–25)
CALCIUM SERPL-MCNC: 9.5 MG/DL (ref 8.4–10.2)
CHLORIDE SERPL-SCNC: 104 MMOL/L (ref 96–108)
CHOLEST SERPL-MCNC: 162 MG/DL
CO2 SERPL-SCNC: 24 MMOL/L (ref 21–32)
CREAT SERPL-MCNC: 0.81 MG/DL (ref 0.6–1.3)
EOSINOPHIL # BLD AUTO: 0.1 THOUSAND/ΜL (ref 0–0.61)
EOSINOPHIL NFR BLD AUTO: 2 % (ref 0–6)
ERYTHROCYTE [DISTWIDTH] IN BLOOD BY AUTOMATED COUNT: 12.6 % (ref 11.6–15.1)
GFR SERPL CREATININE-BSD FRML MDRD: 100 ML/MIN/1.73SQ M
GLUCOSE P FAST SERPL-MCNC: 90 MG/DL (ref 65–99)
HCT VFR BLD AUTO: 45.2 % (ref 36.5–49.3)
HDLC SERPL-MCNC: 56 MG/DL
HGB BLD-MCNC: 15.3 G/DL (ref 12–17)
IMM GRANULOCYTES # BLD AUTO: 0.04 THOUSAND/UL (ref 0–0.2)
IMM GRANULOCYTES NFR BLD AUTO: 1 % (ref 0–2)
LDLC SERPL CALC-MCNC: 57 MG/DL (ref 0–100)
LYMPHOCYTES # BLD AUTO: 1.49 THOUSANDS/ΜL (ref 0.6–4.47)
LYMPHOCYTES NFR BLD AUTO: 34 % (ref 14–44)
MCH RBC QN AUTO: 32.3 PG (ref 26.8–34.3)
MCHC RBC AUTO-ENTMCNC: 33.8 G/DL (ref 31.4–37.4)
MCV RBC AUTO: 95 FL (ref 82–98)
MONOCYTES # BLD AUTO: 0.5 THOUSAND/ΜL (ref 0.17–1.22)
MONOCYTES NFR BLD AUTO: 11 % (ref 4–12)
NEUTROPHILS # BLD AUTO: 2.21 THOUSANDS/ΜL (ref 1.85–7.62)
NEUTS SEG NFR BLD AUTO: 51 % (ref 43–75)
NONHDLC SERPL-MCNC: 106 MG/DL
NRBC BLD AUTO-RTO: 0 /100 WBCS
PLATELET # BLD AUTO: 222 THOUSANDS/UL (ref 149–390)
PMV BLD AUTO: 9.7 FL (ref 8.9–12.7)
POTASSIUM SERPL-SCNC: 4.2 MMOL/L (ref 3.5–5.3)
PROT SERPL-MCNC: 6.9 G/DL (ref 6.4–8.4)
RBC # BLD AUTO: 4.74 MILLION/UL (ref 3.88–5.62)
SODIUM SERPL-SCNC: 138 MMOL/L (ref 135–147)
TRIGL SERPL-MCNC: 243 MG/DL
WBC # BLD AUTO: 4.4 THOUSAND/UL (ref 4.31–10.16)

## 2024-09-16 PROCEDURE — 80053 COMPREHEN METABOLIC PANEL: CPT

## 2024-09-16 PROCEDURE — 85025 COMPLETE CBC W/AUTO DIFF WBC: CPT

## 2024-09-16 PROCEDURE — 36415 COLL VENOUS BLD VENIPUNCTURE: CPT

## 2024-09-16 PROCEDURE — 80061 LIPID PANEL: CPT

## 2024-09-19 ENCOUNTER — OFFICE VISIT (OUTPATIENT)
Dept: FAMILY MEDICINE CLINIC | Facility: CLINIC | Age: 54
End: 2024-09-19
Payer: COMMERCIAL

## 2024-09-19 VITALS
OXYGEN SATURATION: 96 % | WEIGHT: 160 LBS | HEART RATE: 87 BPM | HEIGHT: 65 IN | DIASTOLIC BLOOD PRESSURE: 74 MMHG | RESPIRATION RATE: 18 BRPM | BODY MASS INDEX: 26.66 KG/M2 | TEMPERATURE: 97.9 F | SYSTOLIC BLOOD PRESSURE: 126 MMHG

## 2024-09-19 DIAGNOSIS — F32.A ANXIETY AND DEPRESSION: ICD-10-CM

## 2024-09-19 DIAGNOSIS — Z12.5 SCREENING FOR PROSTATE CANCER: ICD-10-CM

## 2024-09-19 DIAGNOSIS — Z11.59 NEED FOR HEPATITIS C SCREENING TEST: ICD-10-CM

## 2024-09-19 DIAGNOSIS — E78.2 MIXED HYPERLIPIDEMIA: ICD-10-CM

## 2024-09-19 DIAGNOSIS — R00.2 PALPITATIONS: ICD-10-CM

## 2024-09-19 DIAGNOSIS — F41.9 ANXIETY AND DEPRESSION: ICD-10-CM

## 2024-09-19 DIAGNOSIS — F10.90 CHRONIC ALCOHOL USE: ICD-10-CM

## 2024-09-19 DIAGNOSIS — Z00.00 ANNUAL PHYSICAL EXAM: Primary | ICD-10-CM

## 2024-09-19 PROCEDURE — 99396 PREV VISIT EST AGE 40-64: CPT | Performed by: FAMILY MEDICINE

## 2024-09-19 NOTE — ASSESSMENT & PLAN NOTE
Continues to drink 6 pack per night. Has gone to AA in the past. Doesn't need it he enjoys it.  Counseled on cutting back.   Orders:    Comprehensive metabolic panel; Future    CBC; Future

## 2024-09-19 NOTE — PROGRESS NOTES
Adult Annual Physical  Name: Frank Abdi      : 1970      MRN: [id number]  Encounter Provider: Gurpreet Benedict MD  Encounter Date: 2024   Encounter department: [facility]    Assessment & Plan  Annual physical exam         Anxiety and depression  Stable on Wellbutrin 150 mg daily.  Ultimately would like to come off medications.  Plan to stop propranolol at this visit.  Can discuss stopping Wellbutrin at next visit.       Palpitations  Has been on propranolol 20 mg daily for palpitations.  Patient attributes palpitations and his underlying anxiety back to when he was going through divorce.  Currently feels well.  Propranolol is written as 20 mg twice daily but only taking once daily.  He would like to cut back on medications.  Trial off propranolol.         Chronic alcohol use  Continues to drink 6 pack per night. Has gone to AA in the past. Doesn't need it he enjoys it.  Counseled on cutting back.   Orders:    Comprehensive metabolic panel; Future    CBC; Future    Mixed hyperlipidemia    Orders:    Lipid panel; Future    Screening for prostate cancer    Orders:    PSA, Total Screen; Future    Need for hepatitis C screening test    Orders:    Hepatitis C Antibody; Future    Annual physical completed today  Stop propranolol  Remainder of orders above  Colonoscopy up-to-date due   Due for PSA  Follow-up in 6 months    Immunizations and preventive care screenings were discussed with patient today. Appropriate education was printed on patient's after visit summary.    Discussed risks and benefits of prostate cancer screening. We discussed the controversial history of PSA screening for prostate cancer in the [place] as well as the risk of over detection and over treatment of prostate cancer by way of PSA screening.  The patient understands that PSA blood testing is an imperfect way to screen for prostate cancer and that elevated PSA levels in the blood may also be caused by  infection, inflammation, prostatic trauma or manipulation, urological procedures, or by benign prostatic enlargement.    The role of the digital rectal examination in prostate cancer screening was also discussed and I discussed with him that there is large interobserver variability in the findings of digital rectal examination.    Counseling:  Alcohol/drug use: discussed moderation in alcohol intake, the recommendations for healthy alcohol use, and avoidance of illicit drug use.  Dental Health: discussed importance of regular tooth brushing, flossing, and dental visits.  Injury prevention: discussed safety/seat belts, safety helmets, smoke detectors, carbon dioxide detectors, and smoking near bedding or upholstery.  Sexual health: discussed sexually transmitted diseases, partner selection, use of condoms, avoidance of unintended pregnancy, and contraceptive alternatives.  Exercise: the importance of regular exercise/physical activity was discussed. Recommend exercise 3-5 times per week for at least 30 minutes.          History of Present Illness     Adult Annual Physical:  Patient presents for annual physical.     Diet and Physical Activity:  - Diet/Nutrition: well balanced diet.  - Exercise: walking.    Depression Screening:    - PHQ-9 Score: 1    General Health:  - Sleep: sleeps well.  - Hearing: normal hearing bilateral ears.  - Vision: no vision problems.  - Dental: regular dental visits.     Health:    - Urinary symptoms: none.     Review of Systems   Constitutional:  Negative for activity change, fatigue and fever.   Eyes:  Negative for visual disturbance.   Respiratory:  Negative for shortness of breath.    Cardiovascular:  Negative for chest pain.   Gastrointestinal:  Negative for abdominal pain, constipation, diarrhea and nausea.   Endocrine: Negative for cold intolerance and heat intolerance.   Musculoskeletal:  Negative for back pain.   Skin:  Negative for rash.   Neurological:  Negative for headaches.  "  Psychiatric/Behavioral:  Negative for confusion.      Medical History Reviewed by provider this encounter:  Tobacco  Allergies  Meds  Problems  Med Hx  Surg Hx  Fam Hx       Current Outpatient Medications on File Prior to Visit   Medication Sig Dispense Refill    buPROPion (WELLBUTRIN XL) 150 mg 24 hr tablet take 1 tablet by mouth every morning 90 tablet 1    Fexofenadine HCl (ALLEGRA ALLERGY PO) Take by mouth as needed      multivitamin (THERAGRAN) TABS Take 1 tablet by mouth daily      omeprazole (PriLOSEC) 20 mg delayed release capsule Take 1 capsule (20 mg total) by mouth daily for 14 days 14 capsule 0    Probiotic Product (Probiotic-10) CHEW Chew in the morning      rosuvastatin (CRESTOR) 10 MG tablet take 1 tablet by mouth once daily 90 tablet 1    tadalafil (CIALIS) 20 MG tablet take 1 tablet by mouth once daily if needed for ERECTILE DYSFUNCTION 30 tablet 5    [DISCONTINUED] propranolol (INDERAL) 20 mg tablet take 1 tablet by mouth twice a day 180 tablet 1     No current facility-administered medications on file prior to visit.      Social History     Tobacco Use    Smoking status: Some Days     Types: Cigars    Smokeless tobacco: Never    Tobacco comments:     cigars 1 daily   Vaping Use    Vaping status: Never Used   Substance and Sexual Activity    Alcohol use: Not Currently     Alcohol/week: 35.0 standard drinks of alcohol     Types: 35 Cans of beer per week    Drug use: No    Sexual activity: Yes     Partners: Female       Objective     /74 (BP Location: Left arm, Patient Position: Sitting, Cuff Size: Large)   Pulse 87   Temp 97.9 °F (36.6 °C)   Resp 18   Ht 5' 5\" (1.651 m)   Wt 72.6 kg (160 lb)   SpO2 96%   BMI 26.63 kg/m²     Physical Exam  Vitals and nursing note reviewed.   Constitutional:       Appearance: Normal appearance. He is well-developed.   HENT:      Head: Normocephalic and atraumatic.   Cardiovascular:      Rate and Rhythm: Normal rate and regular rhythm. "   Pulmonary:      Effort: Pulmonary effort is normal.      Breath sounds: Normal breath sounds.   Abdominal:      General: Bowel sounds are normal.      Palpations: Abdomen is soft.   Musculoskeletal:      Cervical back: Normal range of motion.   Skin:     General: Skin is warm.   Neurological:      General: No focal deficit present.      Mental Status: He is alert.   Psychiatric:         Mood and Affect: Mood normal.         Speech: Speech normal.

## 2024-09-19 NOTE — ASSESSMENT & PLAN NOTE
Has been on propranolol 20 mg daily for palpitations.  Patient attributes palpitations and his underlying anxiety back to when he was going through divorce.  Currently feels well.  Propranolol is written as 20 mg twice daily but only taking once daily.  He would like to cut back on medications.  Trial off propranolol.

## 2024-09-19 NOTE — ASSESSMENT & PLAN NOTE
Stable on Wellbutrin 150 mg daily.  Ultimately would like to come off medications.  Plan to stop propranolol at this visit.  Can discuss stopping Wellbutrin at next visit.

## 2024-11-11 DIAGNOSIS — F41.9 ANXIETY: ICD-10-CM

## 2024-11-11 RX ORDER — BUPROPION HYDROCHLORIDE 150 MG/1
150 TABLET ORAL EVERY MORNING
Qty: 90 TABLET | Refills: 1 | Status: SHIPPED | OUTPATIENT
Start: 2024-11-11

## 2024-11-11 RX ORDER — PROPRANOLOL HCL 20 MG
20 TABLET ORAL EVERY 12 HOURS SCHEDULED
COMMUNITY

## 2024-12-11 DIAGNOSIS — N52.8 OTHER MALE ERECTILE DYSFUNCTION: ICD-10-CM

## 2024-12-11 RX ORDER — TADALAFIL 20 MG/1
TABLET ORAL
Qty: 90 TABLET | Refills: 1 | Status: SHIPPED | OUTPATIENT
Start: 2024-12-11

## 2025-02-27 DIAGNOSIS — E78.2 MIXED HYPERLIPIDEMIA: ICD-10-CM

## 2025-02-27 RX ORDER — ROSUVASTATIN CALCIUM 10 MG/1
10 TABLET, COATED ORAL DAILY
Qty: 90 TABLET | Refills: 1 | Status: SHIPPED | OUTPATIENT
Start: 2025-02-27

## 2025-03-15 ENCOUNTER — APPOINTMENT (OUTPATIENT)
Dept: LAB | Age: 55
End: 2025-03-15
Payer: COMMERCIAL

## 2025-03-15 DIAGNOSIS — E78.2 MIXED HYPERLIPIDEMIA: ICD-10-CM

## 2025-03-15 DIAGNOSIS — Z11.59 NEED FOR HEPATITIS C SCREENING TEST: ICD-10-CM

## 2025-03-15 DIAGNOSIS — Z12.5 SCREENING FOR PROSTATE CANCER: ICD-10-CM

## 2025-03-15 DIAGNOSIS — F10.90 CHRONIC ALCOHOL USE: ICD-10-CM

## 2025-03-15 LAB
ALBUMIN SERPL BCG-MCNC: 4.4 G/DL (ref 3.5–5)
ALP SERPL-CCNC: 56 U/L (ref 34–104)
ALT SERPL W P-5'-P-CCNC: 23 U/L (ref 7–52)
ANION GAP SERPL CALCULATED.3IONS-SCNC: 11 MMOL/L (ref 4–13)
AST SERPL W P-5'-P-CCNC: 26 U/L (ref 13–39)
BILIRUB SERPL-MCNC: 0.28 MG/DL (ref 0.2–1)
BUN SERPL-MCNC: 10 MG/DL (ref 5–25)
CALCIUM SERPL-MCNC: 9.7 MG/DL (ref 8.4–10.2)
CHLORIDE SERPL-SCNC: 103 MMOL/L (ref 96–108)
CHOLEST SERPL-MCNC: 186 MG/DL (ref ?–200)
CO2 SERPL-SCNC: 24 MMOL/L (ref 21–32)
CREAT SERPL-MCNC: 0.83 MG/DL (ref 0.6–1.3)
ERYTHROCYTE [DISTWIDTH] IN BLOOD BY AUTOMATED COUNT: 12.7 % (ref 11.6–15.1)
GFR SERPL CREATININE-BSD FRML MDRD: 99 ML/MIN/1.73SQ M
GLUCOSE P FAST SERPL-MCNC: 99 MG/DL (ref 65–99)
HCT VFR BLD AUTO: 44.7 % (ref 36.5–49.3)
HCV AB SER QL: NORMAL
HDLC SERPL-MCNC: 45 MG/DL
HGB BLD-MCNC: 15.2 G/DL (ref 12–17)
MCH RBC QN AUTO: 32.5 PG (ref 26.8–34.3)
MCHC RBC AUTO-ENTMCNC: 34 G/DL (ref 31.4–37.4)
MCV RBC AUTO: 96 FL (ref 82–98)
NONHDLC SERPL-MCNC: 141 MG/DL
PLATELET # BLD AUTO: 225 THOUSANDS/UL (ref 149–390)
PMV BLD AUTO: 10.6 FL (ref 8.9–12.7)
POTASSIUM SERPL-SCNC: 4.4 MMOL/L (ref 3.5–5.3)
PROT SERPL-MCNC: 6.9 G/DL (ref 6.4–8.4)
PSA SERPL-MCNC: 1 NG/ML (ref 0–4)
RBC # BLD AUTO: 4.67 MILLION/UL (ref 3.88–5.62)
SODIUM SERPL-SCNC: 138 MMOL/L (ref 135–147)
TRIGL SERPL-MCNC: 520 MG/DL (ref ?–150)
WBC # BLD AUTO: 4.85 THOUSAND/UL (ref 4.31–10.16)

## 2025-03-15 PROCEDURE — G0103 PSA SCREENING: HCPCS

## 2025-03-15 PROCEDURE — 36415 COLL VENOUS BLD VENIPUNCTURE: CPT

## 2025-03-15 PROCEDURE — 86803 HEPATITIS C AB TEST: CPT

## 2025-03-15 PROCEDURE — 85027 COMPLETE CBC AUTOMATED: CPT

## 2025-03-15 PROCEDURE — 80053 COMPREHEN METABOLIC PANEL: CPT

## 2025-03-15 PROCEDURE — 80061 LIPID PANEL: CPT

## 2025-03-19 ENCOUNTER — OFFICE VISIT (OUTPATIENT)
Dept: FAMILY MEDICINE CLINIC | Facility: CLINIC | Age: 55
End: 2025-03-19
Payer: COMMERCIAL

## 2025-03-19 VITALS
HEART RATE: 87 BPM | DIASTOLIC BLOOD PRESSURE: 88 MMHG | BODY MASS INDEX: 27.49 KG/M2 | RESPIRATION RATE: 16 BRPM | HEIGHT: 65 IN | OXYGEN SATURATION: 98 % | TEMPERATURE: 98.3 F | WEIGHT: 165 LBS | SYSTOLIC BLOOD PRESSURE: 126 MMHG

## 2025-03-19 DIAGNOSIS — F10.90 CHRONIC ALCOHOL USE: ICD-10-CM

## 2025-03-19 DIAGNOSIS — E78.2 MIXED HYPERLIPIDEMIA: Primary | ICD-10-CM

## 2025-03-19 DIAGNOSIS — R00.2 PALPITATIONS: ICD-10-CM

## 2025-03-19 DIAGNOSIS — K76.0 NAFLD (NONALCOHOLIC FATTY LIVER DISEASE): ICD-10-CM

## 2025-03-19 DIAGNOSIS — F32.9 REACTIVE DEPRESSION: ICD-10-CM

## 2025-03-19 PROCEDURE — 99214 OFFICE O/P EST MOD 30 MIN: CPT | Performed by: FAMILY MEDICINE

## 2025-03-19 RX ORDER — OMEGA-3-ACID ETHYL ESTERS 1 G/1
2 CAPSULE, LIQUID FILLED ORAL 2 TIMES DAILY
Qty: 360 CAPSULE | Refills: 1 | Status: SHIPPED | OUTPATIENT
Start: 2025-03-19

## 2025-03-19 NOTE — PROGRESS NOTES
Name: Frank Abdi      : 1970      MRN: [id number]  Encounter Provider: Gurpreet Bneedict MD  Encounter Date: 3/19/2025   Encounter department: [facility]  :  Assessment & Plan  Mixed hyperlipidemia  Cholesterol is improved however triglycerides are elevated greater than 500.  Plan to start patient on Lovaza in addition to Crestor.  Repeat lipid panel in 6 months.  Orders:    omega-3-acid ethyl esters (LOVAZA) 1 g capsule; Take 2 capsules (2 g total) by mouth 2 (two) times a day    Lipid Panel with Direct LDL reflex; Future    Reactive depression  Overall doing well.  Continue Wellbutrin 150 mg daily         Palpitations  Patient was tried off propranolol unable to tolerate.  Continue propranolol 20 mg a day  Orders:    Comprehensive metabolic panel; Future    CBC and differential; Future    NAFLD (nonalcoholic fatty liver disease)  Fib 4 score 1.3.  Obtain ultrasound and ELF score.  Counseled on alcohol cessation  Orders:    US elastography/UGAP; Future    Enhanced Liver Fibrosis (ELF) Score; Future    Chronic alcohol use  Continues to drink 6 pack per night. Has gone to AA in the past.  Counseled patient on risks of continued alcohol use.  Patient understands.  Not interested in quitting at this time.         Follow-up in 6 months      Tobacco Cessation Counseling: Tobacco cessation counseling was provided. The patient is sincerely urged to quit consumption of tobacco. He is not ready to quit tobacco. Medication options discussed.       History of Present Illness   Patient presents with:  Follow-up: 6m     Patient is today for 6-month follow-up.  Overall doing well.  Recent complete blood work.  Has been tolerating Crestor well.  Triglycerides are elevated.  Continues to drink 5-6 nightly.  He knows he is drinking more than he should but ultimately does not have a desire to quit.  Does not drink prior to 5 PM.  Trying to stay active.  Golfs weekly.  He tried to stop propranolol but  "palpitations returned.      Review of Systems   Constitutional:  Negative for activity change, fatigue and fever.   Eyes:  Negative for visual disturbance.   Respiratory:  Negative for shortness of breath.    Cardiovascular:  Negative for chest pain.   Gastrointestinal:  Negative for abdominal pain, constipation, diarrhea and nausea.   Endocrine: Negative for cold intolerance and heat intolerance.   Musculoskeletal:  Negative for back pain.   Skin:  Negative for rash.   Neurological:  Negative for headaches.   Psychiatric/Behavioral:  Negative for confusion.        Objective   /88 (BP Location: Left arm, Patient Position: Sitting, Cuff Size: Large)   Pulse 87   Temp 98.3 °F (36.8 °C) (Temporal)   Resp 16   Ht 5' 5\" (1.651 m)   Wt 74.8 kg (165 lb)   SpO2 98%   BMI 27.46 kg/m²      Physical Exam  Vitals and nursing note reviewed.   Constitutional:       Appearance: Normal appearance. He is well-developed.   HENT:      Head: Normocephalic and atraumatic.   Cardiovascular:      Rate and Rhythm: Normal rate and regular rhythm.   Pulmonary:      Effort: Pulmonary effort is normal.      Breath sounds: Normal breath sounds.   Abdominal:      General: Bowel sounds are normal.      Palpations: Abdomen is soft.   Musculoskeletal:      Cervical back: Normal range of motion.   Skin:     General: Skin is warm.   Neurological:      General: No focal deficit present.      Mental Status: He is alert.   Psychiatric:         Mood and Affect: Mood normal.         Speech: Speech normal.         "

## 2025-03-19 NOTE — ASSESSMENT & PLAN NOTE
Cholesterol is improved however triglycerides are elevated greater than 500.  Plan to start patient on Lovaza in addition to Crestor.  Repeat lipid panel in 6 months.  Orders:    omega-3-acid ethyl esters (LOVAZA) 1 g capsule; Take 2 capsules (2 g total) by mouth 2 (two) times a day    Lipid Panel with Direct LDL reflex; Future

## 2025-03-19 NOTE — ASSESSMENT & PLAN NOTE
Continues to drink 6 pack per night. Has gone to AA in the past.  Counseled patient on risks of continued alcohol use.  Patient understands.  Not interested in quitting at this time.

## 2025-03-19 NOTE — ASSESSMENT & PLAN NOTE
Patient was tried off propranolol unable to tolerate.  Continue propranolol 20 mg a day  Orders:    Comprehensive metabolic panel; Future    CBC and differential; Future

## 2025-03-20 DIAGNOSIS — R03.0 ELEVATED BLOOD PRESSURE READING IN OFFICE WITHOUT DIAGNOSIS OF HYPERTENSION: Primary | ICD-10-CM

## 2025-03-20 RX ORDER — PROPRANOLOL HCL 20 MG
20 TABLET ORAL 2 TIMES DAILY
Qty: 180 TABLET | Refills: 1 | Status: SHIPPED | OUTPATIENT
Start: 2025-03-20

## 2025-05-10 DIAGNOSIS — F41.9 ANXIETY: ICD-10-CM

## 2025-05-11 RX ORDER — BUPROPION HYDROCHLORIDE 150 MG/1
150 TABLET ORAL EVERY MORNING
Qty: 90 TABLET | Refills: 1 | Status: SHIPPED | OUTPATIENT
Start: 2025-05-11

## 2025-07-24 DIAGNOSIS — N52.8 OTHER MALE ERECTILE DYSFUNCTION: ICD-10-CM

## 2025-07-27 RX ORDER — TADALAFIL 20 MG/1
20 TABLET ORAL DAILY PRN
Qty: 90 TABLET | Refills: 1 | Status: SHIPPED | OUTPATIENT
Start: 2025-07-27

## 2025-07-28 ENCOUNTER — TELEPHONE (OUTPATIENT)
Age: 55
End: 2025-07-28

## 2025-08-09 ENCOUNTER — TELEMEDICINE (OUTPATIENT)
Dept: OTHER | Facility: HOSPITAL | Age: 55
End: 2025-08-09
Payer: COMMERCIAL

## 2025-08-09 DIAGNOSIS — U07.1 COVID-19: Primary | ICD-10-CM

## 2025-08-09 PROCEDURE — 99213 OFFICE O/P EST LOW 20 MIN: CPT | Performed by: NURSE PRACTITIONER
